# Patient Record
Sex: FEMALE | Race: WHITE | Employment: UNEMPLOYED | URBAN - METROPOLITAN AREA
[De-identification: names, ages, dates, MRNs, and addresses within clinical notes are randomized per-mention and may not be internally consistent; named-entity substitution may affect disease eponyms.]

---

## 2017-02-23 ENCOUNTER — OFFICE VISIT (OUTPATIENT)
Dept: FAMILY MEDICINE CLINIC | Facility: CLINIC | Age: 52
End: 2017-02-23

## 2017-02-23 VITALS
TEMPERATURE: 98 F | BODY MASS INDEX: 27 KG/M2 | SYSTOLIC BLOOD PRESSURE: 150 MMHG | WEIGHT: 161 LBS | OXYGEN SATURATION: 98 % | RESPIRATION RATE: 18 BRPM | HEART RATE: 90 BPM | DIASTOLIC BLOOD PRESSURE: 82 MMHG

## 2017-02-23 DIAGNOSIS — J32.9 VIRAL SINUSITIS: Primary | ICD-10-CM

## 2017-02-23 DIAGNOSIS — B97.89 VIRAL SINUSITIS: Primary | ICD-10-CM

## 2017-02-23 PROCEDURE — 99213 OFFICE O/P EST LOW 20 MIN: CPT | Performed by: PHYSICIAN ASSISTANT

## 2017-02-23 RX ORDER — DOXYCYCLINE HYCLATE 100 MG
100 TABLET ORAL 2 TIMES DAILY
Qty: 20 TABLET | Refills: 0 | Status: SHIPPED | OUTPATIENT
Start: 2017-02-23 | End: 2017-03-05

## 2017-02-23 RX ORDER — FLUTICASONE PROPIONATE 50 MCG
2 SPRAY, SUSPENSION (ML) NASAL DAILY
Qty: 1 BOTTLE | Refills: 0 | Status: SHIPPED | OUTPATIENT
Start: 2017-02-23 | End: 2018-02-18

## 2017-02-23 RX ORDER — OMEPRAZOLE 20 MG/1
20 CAPSULE, DELAYED RELEASE ORAL
COMMUNITY

## 2017-02-23 RX ORDER — GUAIFENESIN AND CODEINE PHOSPHATE 100; 10 MG/5ML; MG/5ML
10 SOLUTION ORAL EVERY 6 HOURS PRN
Qty: 120 ML | Refills: 0 | Status: SHIPPED | OUTPATIENT
Start: 2017-02-23 | End: 2017-04-28 | Stop reason: ALTCHOICE

## 2017-02-23 NOTE — PATIENT INSTRUCTIONS
1.  Guaifenesin/codeine as prescribed for cough. This medication may cause drowsiness/sedation. Avoid driving or operating heavy machinery while on this medication.     2.  Encourage fluids, humidifier/vaporizor at bedside, elevate head of bed (sleep with · Over-the-counter decongestants may be used unless a similar medicine was prescribed. Nasal sprays work the fastest. Use one that contains phenylephrine or oxymetazoline. First blow the nose gently. Then use the spray.  Do not use these medicines more ofte © 7010-7134 [address], [address] [place]. All rights reserved. This information is not intended as a substitute for professional medical care. Always follow your healthcare professional's instructions.

## 2017-02-23 NOTE — PROGRESS NOTES
CHIEF COMPLAINT:   Patient presents with:  Cough/URI      HPI:   Kirill Galindo is a 54year old female who presents for cold symptoms for  5  days. Symptoms have progressed into sinus congestion and been worsening since onset.  Sinus congestion/pain is • Hypertension Mother         Smoking Status: Never Smoker                      Smokeless Status: Never Used                        Alcohol Use: Yes                Comment: social drinker        REVIEW OF SYSTEMS:   GENERAL:  normal appetite  SKIN: no rash 1.  D/w pt suspect viral sinusitis. Typical course/duration reviewed.   Tx aimed at symptom management, reviewed continue Flonase, encourage fluids, robitussin AC qhs, warm compresses over forehead, trial of OTC IBU or Aleve prn for sinus pressure/HA ,sinu The sinuses are air-filled spaces within the bones of the face. They connect to the inside of the nose. Sinusitis is an inflammation of the tissue lining the sinus cavity.  Sinus inflammation can occur during a cold. It can also be due to allergies to polle · Use acetaminophen or ibuprofen to control pain, unless another pain medicine was prescribed. (If you have chronic liver or kidney disease or ever had a stomach ulcer, talk with your doctor before using these medicines.  Aspirin should never be used in any

## 2017-04-10 ENCOUNTER — TELEPHONE (OUTPATIENT)
Dept: FAMILY MEDICINE CLINIC | Facility: CLINIC | Age: 52
End: 2017-04-10

## 2017-04-28 ENCOUNTER — OFFICE VISIT (OUTPATIENT)
Dept: FAMILY MEDICINE CLINIC | Facility: CLINIC | Age: 52
End: 2017-04-28

## 2017-04-28 VITALS
HEIGHT: 63.75 IN | HEART RATE: 80 BPM | WEIGHT: 163 LBS | SYSTOLIC BLOOD PRESSURE: 130 MMHG | TEMPERATURE: 99 F | OXYGEN SATURATION: 98 % | BODY MASS INDEX: 28.17 KG/M2 | DIASTOLIC BLOOD PRESSURE: 80 MMHG

## 2017-04-28 DIAGNOSIS — J06.9 VIRAL URI: Primary | ICD-10-CM

## 2017-04-28 DIAGNOSIS — J04.0 LARYNGITIS: ICD-10-CM

## 2017-04-28 PROCEDURE — 99213 OFFICE O/P EST LOW 20 MIN: CPT | Performed by: FAMILY MEDICINE

## 2017-04-28 RX ORDER — FEXOFENADINE HCL 180 MG/1
180 TABLET ORAL DAILY
COMMUNITY
End: 2018-06-05

## 2017-04-28 RX ORDER — SULFAMETHOXAZOLE AND TRIMETHOPRIM 800; 160 MG/1; MG/1
1 TABLET ORAL 2 TIMES DAILY
Qty: 14 TABLET | Refills: 0 | Status: SHIPPED | OUTPATIENT
Start: 2017-04-28 | End: 2017-05-05

## 2017-04-28 NOTE — PROGRESS NOTES
HPI:   Tierney Wick is a 50year old female who presents for upper respiratory symptoms for 4 days.  Symptoms include started with scratchy throat, post nasal drip, fatigue, lost voice, now feels like it's settled in her face with cheek pain and ear p 63.75\"  Wt 163 lb  BMI 28.21 kg/m2  SpO2 98%  LMP 04/21/2017 (Within Days)  GENERAL: well developed, well nourished,in no apparent distress; sounds congested and has hoarse soft voice  SKIN: no rashes,no suspicious lesions  EYES: EOMI, conjunctiva are kip

## 2017-05-15 ENCOUNTER — TELEPHONE (OUTPATIENT)
Dept: FAMILY MEDICINE CLINIC | Facility: CLINIC | Age: 52
End: 2017-05-15

## 2017-06-20 ENCOUNTER — TELEPHONE (OUTPATIENT)
Dept: FAMILY MEDICINE CLINIC | Facility: CLINIC | Age: 52
End: 2017-06-20

## 2017-07-12 ENCOUNTER — TELEPHONE (OUTPATIENT)
Dept: FAMILY MEDICINE CLINIC | Facility: CLINIC | Age: 52
End: 2017-07-12

## 2017-07-12 DIAGNOSIS — E55.9 VITAMIN D DEFICIENCY: ICD-10-CM

## 2017-07-12 DIAGNOSIS — Z13.0 SCREENING, ANEMIA, DEFICIENCY, IRON: ICD-10-CM

## 2017-07-12 DIAGNOSIS — Z13.220 LIPID SCREENING: ICD-10-CM

## 2017-07-12 DIAGNOSIS — Z13.29 THYROID DISORDER SCREEN: ICD-10-CM

## 2017-07-12 DIAGNOSIS — Z00.00 HEALTHCARE MAINTENANCE: Primary | ICD-10-CM

## 2017-07-12 NOTE — TELEPHONE ENCOUNTER
Pt is coming in tomorrow for her CMP,  Wants to know if [facility] will do a full panel of fasting labs while she is here.

## 2017-07-13 ENCOUNTER — NURSE ONLY (OUTPATIENT)
Dept: FAMILY MEDICINE CLINIC | Facility: CLINIC | Age: 52
End: 2017-07-13

## 2017-07-13 DIAGNOSIS — Z00.00 HEALTHCARE MAINTENANCE: ICD-10-CM

## 2017-07-13 DIAGNOSIS — E55.9 VITAMIN D DEFICIENCY: ICD-10-CM

## 2017-07-13 DIAGNOSIS — Z13.29 THYROID DISORDER SCREEN: ICD-10-CM

## 2017-07-13 DIAGNOSIS — Z13.220 LIPID SCREENING: ICD-10-CM

## 2017-07-13 DIAGNOSIS — Z13.0 SCREENING, ANEMIA, DEFICIENCY, IRON: ICD-10-CM

## 2017-07-13 LAB
25-HYDROXYVITAMIN D (TOTAL): 22.4 NG/ML (ref 30–100)
BASOPHILS # BLD AUTO: 0.07 X10(3) UL (ref 0–0.1)
BASOPHILS NFR BLD AUTO: 0.9 %
CHOLEST SMN-MCNC: 192 MG/DL (ref ?–200)
DEPRECATED HBV CORE AB SER IA-ACNC: 14.8 NG/ML (ref 10–291)
EOSINOPHIL # BLD AUTO: 0.17 X10(3) UL (ref 0–0.3)
EOSINOPHIL NFR BLD AUTO: 2.3 %
ERYTHROCYTE [DISTWIDTH] IN BLOOD BY AUTOMATED COUNT: 13 % (ref 11.5–16)
HCT VFR BLD AUTO: 42.6 % (ref 34–50)
HDLC SERPL-MCNC: 41 MG/DL (ref 45–?)
HDLC SERPL: 4.68 {RATIO} (ref ?–4.44)
HGB BLD-MCNC: 13.9 G/DL (ref 12–16)
IMMATURE GRANULOCYTE COUNT: 0.02 X10(3) UL (ref 0–1)
IMMATURE GRANULOCYTE RATIO %: 0.3 %
LDLC SERPL CALC-MCNC: 116 MG/DL (ref ?–130)
LYMPHOCYTES # BLD AUTO: 1.38 X10(3) UL (ref 0.9–4)
LYMPHOCYTES NFR BLD AUTO: 18.3 %
MCH RBC QN AUTO: 27.8 PG (ref 27–33.2)
MCHC RBC AUTO-ENTMCNC: 32.6 G/DL (ref 31–37)
MCV RBC AUTO: 85.2 FL (ref 81–100)
MONOCYTES # BLD AUTO: 0.87 X10(3) UL (ref 0.1–0.6)
MONOCYTES NFR BLD AUTO: 11.5 %
NEUTROPHIL ABS PRELIM: 5.03 X10 (3) UL (ref 1.3–6.7)
NEUTROPHILS # BLD AUTO: 5.03 X10(3) UL (ref 1.3–6.7)
NEUTROPHILS NFR BLD AUTO: 66.7 %
NONHDLC SERPL-MCNC: 151 MG/DL (ref ?–130)
PLATELET # BLD AUTO: 287 10(3)UL (ref 150–450)
RBC # BLD AUTO: 5 X10(6)UL (ref 3.8–5.1)
RED CELL DISTRIBUTION WIDTH-SD: 39.7 FL (ref 35.1–46.3)
TRIGLYCERIDES: 177 MG/DL (ref ?–150)
TSI SER-ACNC: 0.76 MIU/ML (ref 0.35–5.5)
VLDL: 35 MG/DL (ref 5–40)
WBC # BLD AUTO: 7.5 X10(3) UL (ref 4–13)

## 2017-07-13 PROCEDURE — 80061 LIPID PANEL: CPT | Performed by: FAMILY MEDICINE

## 2017-07-13 PROCEDURE — 82728 ASSAY OF FERRITIN: CPT | Performed by: FAMILY MEDICINE

## 2017-07-13 PROCEDURE — 36415 COLL VENOUS BLD VENIPUNCTURE: CPT | Performed by: FAMILY MEDICINE

## 2017-07-13 PROCEDURE — 80050 GENERAL HEALTH PANEL: CPT | Performed by: FAMILY MEDICINE

## 2017-07-13 PROCEDURE — 82306 VITAMIN D 25 HYDROXY: CPT | Performed by: FAMILY MEDICINE

## 2018-06-05 ENCOUNTER — OFFICE VISIT (OUTPATIENT)
Dept: FAMILY MEDICINE CLINIC | Facility: CLINIC | Age: 53
End: 2018-06-05

## 2018-06-05 VITALS
HEART RATE: 86 BPM | BODY MASS INDEX: 27.14 KG/M2 | DIASTOLIC BLOOD PRESSURE: 80 MMHG | SYSTOLIC BLOOD PRESSURE: 140 MMHG | WEIGHT: 159 LBS | TEMPERATURE: 99 F | RESPIRATION RATE: 16 BRPM | HEIGHT: 64 IN

## 2018-06-05 DIAGNOSIS — J01.40 ACUTE NON-RECURRENT PANSINUSITIS: Primary | ICD-10-CM

## 2018-06-05 PROCEDURE — 99214 OFFICE O/P EST MOD 30 MIN: CPT | Performed by: FAMILY MEDICINE

## 2018-06-05 RX ORDER — AZITHROMYCIN 250 MG/1
TABLET, FILM COATED ORAL
Qty: 6 TABLET | Refills: 0 | Status: SHIPPED | OUTPATIENT
Start: 2018-06-05 | End: 2018-06-23 | Stop reason: ALTCHOICE

## 2018-06-23 NOTE — PROGRESS NOTES
HPI:   Geronimo Hernández is a 48year old female who presents for upper respiratory symptoms for 6 days. Symptoms include worseining congestion, cough, head pressure. Nothing mas better or worse but overll worsening.   Medications tried otc cough/cold pro percussion  NECK: supple,no adenopathy  LUNGS: clear to auscultation  CARDIO: RRR without murmur; well perfused    ASSESSMENT AND PLAN:   Victor Manuel Dugan is a 48year old female who presents with sinusitis.  PLAN:   abx indicated; push fluids, run humidi

## 2018-10-24 PROCEDURE — 83090 ASSAY OF HOMOCYSTEINE: CPT | Performed by: OTHER

## 2018-11-12 ENCOUNTER — OFFICE VISIT (OUTPATIENT)
Dept: FAMILY MEDICINE CLINIC | Facility: CLINIC | Age: 53
End: 2018-11-12
Payer: COMMERCIAL

## 2018-11-12 VITALS
WEIGHT: 164 LBS | HEIGHT: 65.5 IN | HEART RATE: 86 BPM | DIASTOLIC BLOOD PRESSURE: 76 MMHG | BODY MASS INDEX: 27 KG/M2 | SYSTOLIC BLOOD PRESSURE: 124 MMHG | TEMPERATURE: 99 F

## 2018-11-12 DIAGNOSIS — E55.9 VITAMIN D DEFICIENCY: ICD-10-CM

## 2018-11-12 DIAGNOSIS — Z23 NEED FOR VACCINATION: ICD-10-CM

## 2018-11-12 DIAGNOSIS — Z13.220 LIPID SCREENING: ICD-10-CM

## 2018-11-12 DIAGNOSIS — G37.3 TRANSVERSE MYELITIS (HCC): ICD-10-CM

## 2018-11-12 DIAGNOSIS — K21.9 GASTROESOPHAGEAL REFLUX DISEASE WITHOUT ESOPHAGITIS: ICD-10-CM

## 2018-11-12 DIAGNOSIS — E78.2 ELEVATED TRIGLYCERIDES WITH HIGH CHOLESTEROL: ICD-10-CM

## 2018-11-12 DIAGNOSIS — Z00.00 ROUTINE HISTORY AND PHYSICAL EXAMINATION OF ADULT: Primary | ICD-10-CM

## 2018-11-12 DIAGNOSIS — Z13.29 THYROID DISORDER SCREEN: ICD-10-CM

## 2018-11-12 DIAGNOSIS — J30.9 ALLERGIC RHINITIS, UNSPECIFIED SEASONALITY, UNSPECIFIED TRIGGER: ICD-10-CM

## 2018-11-12 DIAGNOSIS — Z13.0 SCREENING, ANEMIA, DEFICIENCY, IRON: ICD-10-CM

## 2018-11-12 DIAGNOSIS — Z13.1 DIABETES MELLITUS SCREENING: ICD-10-CM

## 2018-11-12 PROCEDURE — 90471 IMMUNIZATION ADMIN: CPT | Performed by: FAMILY MEDICINE

## 2018-11-12 PROCEDURE — 99396 PREV VISIT EST AGE 40-64: CPT | Performed by: FAMILY MEDICINE

## 2018-11-12 PROCEDURE — 90715 TDAP VACCINE 7 YRS/> IM: CPT | Performed by: FAMILY MEDICINE

## 2018-11-12 PROCEDURE — 80050 GENERAL HEALTH PANEL: CPT | Performed by: FAMILY MEDICINE

## 2018-11-12 PROCEDURE — 82306 VITAMIN D 25 HYDROXY: CPT | Performed by: FAMILY MEDICINE

## 2018-11-12 PROCEDURE — 36415 COLL VENOUS BLD VENIPUNCTURE: CPT | Performed by: FAMILY MEDICINE

## 2018-11-12 PROCEDURE — 83036 HEMOGLOBIN GLYCOSYLATED A1C: CPT | Performed by: FAMILY MEDICINE

## 2018-11-12 PROCEDURE — 80061 LIPID PANEL: CPT | Performed by: FAMILY MEDICINE

## 2018-11-12 NOTE — PROGRESS NOTES
HPI:   Simi Kohli is a 48year old female who presents for a complete physical exam.      Patient complains of nothing major, GERD has flared (saw GI, on omeprazole, scheduld for EGD) but feels like pills and even water get stuck sometimes.   Eric Glaser 3350-KCl-NaBcb-NaCl-NaSulf (PEG 3350/ELECTROLYTES) 240 g Oral Recon Soln Take as directed by physician. Disp: 4000 mL Rfl: 0   Na Sulfate-K Sulfate-Mg Sulf (SUPREP BOWEL PREP KIT) 17.5-3.13-1.6 GM/177ML Oral Solution Take as directed by physician.  Disp: 1 HPI; no nausea, no changes in BMs, no blood in stool  MUSCULOSKELETAL: denies new or unusual back pain, no joint pains or swelling  NEURO: denies headaches, no syncope or near syncope; gets RLS symptoms at night, though she and rubén not sure if effect of CPM    6. Screening, anemia, deficiency, iron  CBC    7. Diabetes mellitus screening  CMP, HbA1C    8. Thyroid disorder screen  tsh    9. Lipid screening  Lipid  10.  Vit D def  Vit d 25-oh        For disease prevention (dementia, cancer, diabetes, heart di

## 2018-11-14 ENCOUNTER — TELEPHONE (OUTPATIENT)
Dept: FAMILY MEDICINE CLINIC | Facility: CLINIC | Age: 53
End: 2018-11-14

## 2018-11-14 NOTE — TELEPHONE ENCOUNTER
----- Message from Christina Nathan MD sent at 11/14/2018  7:51 AM CST -----  Please notify patient good news, labs look good, including blood counts, blood sugar, kidneys, liver, electrolytes, thyroid.   Cholesterol went up a bit this year, nothing too alarmi

## 2018-11-14 NOTE — PROGRESS NOTES
Please notify patient good news, labs look good, including blood counts, blood sugar, kidneys, liver, electrolytes, thyroid. Cholesterol went up a bit this year, nothing too alarming.   If there is room for improvement in eating habits (shooting for a plan

## 2018-11-14 NOTE — TELEPHONE ENCOUNTER
Pt advised of results- verbalized understanding    Pt states she is currently not taking Vitamin D3- what dose would you recommend?

## 2018-11-19 ENCOUNTER — LAB REQUISITION (OUTPATIENT)
Dept: LAB | Facility: HOSPITAL | Age: 53
End: 2018-11-19
Payer: COMMERCIAL

## 2018-11-19 DIAGNOSIS — Z12.12 ENCOUNTER FOR SCREENING FOR MALIGNANT NEOPLASM OF RECTUM: ICD-10-CM

## 2018-11-19 DIAGNOSIS — K21.9 GASTRO-ESOPHAGEAL REFLUX DISEASE WITHOUT ESOPHAGITIS: ICD-10-CM

## 2018-11-19 DIAGNOSIS — Z12.11 ENCOUNTER FOR SCREENING FOR MALIGNANT NEOPLASM OF COLON: ICD-10-CM

## 2018-11-19 DIAGNOSIS — R14.0 ABDOMINAL DISTENSION (GASEOUS): ICD-10-CM

## 2018-11-19 PROCEDURE — 88305 TISSUE EXAM BY PATHOLOGIST: CPT | Performed by: INTERNAL MEDICINE

## 2018-11-21 ENCOUNTER — NURSE ONLY (OUTPATIENT)
Dept: FAMILY MEDICINE CLINIC | Facility: CLINIC | Age: 53
End: 2018-11-21
Payer: COMMERCIAL

## 2018-11-21 ENCOUNTER — TELEPHONE (OUTPATIENT)
Dept: FAMILY MEDICINE CLINIC | Facility: CLINIC | Age: 53
End: 2018-11-21

## 2018-11-21 DIAGNOSIS — R35.0 URINE FREQUENCY: Primary | ICD-10-CM

## 2018-11-21 PROCEDURE — 81003 URINALYSIS AUTO W/O SCOPE: CPT | Performed by: FAMILY MEDICINE

## 2018-11-21 PROCEDURE — 87086 URINE CULTURE/COLONY COUNT: CPT | Performed by: FAMILY MEDICINE

## 2018-11-21 NOTE — PROGRESS NOTES
Recent Results (from the past 24 hour(s))   URINALYSIS, AUTO, W/O SCOPE    Collection Time: 11/21/18 11:08 AM   Result Value Ref Range    GLUCOSE (URINE DIPSTICK) Negative mg/dL    BILIRUBIN Negative Negative    KETONES (URINE DIPSTICK) Negative Negative m

## 2018-11-21 NOTE — TELEPHONE ENCOUNTER
Spoke with the pt and she has urgency and lower abd cramping. She did have her colonoscopy on Monday and wonders if this is the culprit for the symptoms.  Advised that Dr. Soha Vallecillo is out of the office and I will see if one of the covering providers need to se

## 2018-11-21 NOTE — TELEPHONE ENCOUNTER
Per Dr. Alice Davis order a urine dip and he will address    Called the pt and she is going to come over and give a urine sample  Future Appointments   Date Time Provider Doroteo Spann   11/21/2018 10:00 AM  [facility] [place] EMG Cora Fry

## 2019-01-28 ENCOUNTER — PATIENT MESSAGE (OUTPATIENT)
Dept: FAMILY MEDICINE CLINIC | Facility: CLINIC | Age: 54
End: 2019-01-28

## 2019-01-28 DIAGNOSIS — Z12.31 SCREENING MAMMOGRAM, ENCOUNTER FOR: Primary | ICD-10-CM

## 2019-01-28 NOTE — TELEPHONE ENCOUNTER
From: Helga Chan  To: Justino Vale MD  Sent: 1/28/2019 10:27 AM CST  Subject: Non-Urgent Medical Question    Good Morning,    I was wondering if you wouldn't mind ordering my mammogram for me this year.      I am not due to see Dr. Rose Benitez OB/GYN

## 2019-02-11 ENCOUNTER — HOSPITAL ENCOUNTER (OUTPATIENT)
Dept: MRI IMAGING | Age: 54
Discharge: HOME | End: 2019-02-11
Attending: FAMILY MEDICINE
Payer: COMMERCIAL

## 2019-02-11 DIAGNOSIS — M77.11 RIGHT LATERAL EPICONDYLITIS: ICD-10-CM

## 2019-02-11 PROCEDURE — 73221 MRI JOINT UPR EXTREM W/O DYE: CPT | Performed by: FAMILY MEDICINE

## 2019-02-27 PROBLEM — M77.01 MEDIAL EPICONDYLITIS OF RIGHT ELBOW: Status: ACTIVE | Noted: 2019-02-27

## 2019-02-27 PROBLEM — M77.11 RIGHT LATERAL EPICONDYLITIS: Status: ACTIVE | Noted: 2019-02-27

## 2019-03-01 ENCOUNTER — TELEPHONE (OUTPATIENT)
Dept: FAMILY MEDICINE CLINIC | Facility: CLINIC | Age: 54
End: 2019-03-01

## 2019-03-01 NOTE — TELEPHONE ENCOUNTER
Letter mailed to patient reminding her she has outstanding orders.     Lab Frequency Next Occurrence   RONEL SCREENING BILAT (CPT=77067) Once 01/30/2019

## 2020-01-22 ENCOUNTER — HOSPITAL ENCOUNTER (OUTPATIENT)
Dept: GENERAL RADIOLOGY | Age: 55
Discharge: HOME | End: 2020-01-22
Attending: FAMILY MEDICINE
Payer: COMMERCIAL

## 2020-01-22 ENCOUNTER — OFFICE VISIT (OUTPATIENT)
Dept: FAMILY MEDICINE CLINIC | Facility: CLINIC | Age: 55
End: 2020-01-22
Payer: COMMERCIAL

## 2020-01-22 VITALS
BODY MASS INDEX: 29 KG/M2 | OXYGEN SATURATION: 98 % | DIASTOLIC BLOOD PRESSURE: 80 MMHG | TEMPERATURE: 99 F | WEIGHT: 174.19 LBS | SYSTOLIC BLOOD PRESSURE: 130 MMHG

## 2020-01-22 DIAGNOSIS — R05.9 COUGH: ICD-10-CM

## 2020-01-22 DIAGNOSIS — R07.89 CHEST TIGHTNESS: ICD-10-CM

## 2020-01-22 DIAGNOSIS — R05.9 COUGH: Primary | ICD-10-CM

## 2020-01-22 PROCEDURE — 71046 X-RAY EXAM CHEST 2 VIEWS: CPT | Performed by: FAMILY MEDICINE

## 2020-01-22 PROCEDURE — 99214 OFFICE O/P EST MOD 30 MIN: CPT | Performed by: FAMILY MEDICINE

## 2020-01-22 PROCEDURE — 94640 AIRWAY INHALATION TREATMENT: CPT | Performed by: FAMILY MEDICINE

## 2020-01-22 RX ORDER — AZITHROMYCIN 250 MG/1
TABLET, FILM COATED ORAL
Qty: 6 TABLET | Refills: 0 | Status: SHIPPED | OUTPATIENT
Start: 2020-01-22 | End: 2020-10-21 | Stop reason: CLARIF

## 2020-01-22 RX ORDER — ALBUTEROL SULFATE 2.5 MG/3ML
2.5 SOLUTION RESPIRATORY (INHALATION) ONCE
Status: COMPLETED | OUTPATIENT
Start: 2020-01-22 | End: 2020-01-22

## 2020-01-22 RX ORDER — BUDESONIDE AND FORMOTEROL FUMARATE DIHYDRATE 80; 4.5 UG/1; UG/1
2 AEROSOL RESPIRATORY (INHALATION) 2 TIMES DAILY
Qty: 1 INHALER | Refills: 0 | COMMUNITY
Start: 2020-01-22 | End: 2020-02-22

## 2020-01-22 RX ADMIN — ALBUTEROL SULFATE 2.5 MG: 2.5 SOLUTION RESPIRATORY (INHALATION) at 11:30:00

## 2020-01-22 NOTE — PROGRESS NOTES
HPI:   Helga Chan is a 48year old female who presents for upper respiratory symptoms for 7 days. Started with: right away in her chest, like a chest heaviness and mild cough, that lasted a few days.     Now has: worse congestion, worse cough, po incontinence 2018   • Transverse myelitis (HCC)     has residual neurologic symptoms, sees neuro   • Uncomfortable fullness after meals 09/30/2018   • Wears glasses       Past Surgical History:   Procedure Laterality Date   • CHOLECYSTECTOMY     • EGD (VENTOLIN) (2.5 MG/3ML) 0.083% nebulizer solution 2.5 mg  -     XR CHEST PA + LAT CHEST (CPT=71046); Future    Chest tightness  -     albuterol sulfate (VENTOLIN) (2.5 MG/3ML) 0.083% nebulizer solution 2.5 mg  -     XR CHEST PA + LAT CHEST (CPT=71046);  Fut

## 2020-02-12 ENCOUNTER — HOSPITAL ENCOUNTER (OUTPATIENT)
Dept: GENERAL RADIOLOGY | Age: 55
Discharge: HOME | End: 2020-02-12
Attending: FAMILY MEDICINE
Payer: COMMERCIAL

## 2020-02-12 DIAGNOSIS — R93.89 ABNORMAL CHEST X-RAY: ICD-10-CM

## 2020-02-12 PROCEDURE — 71046 X-RAY EXAM CHEST 2 VIEWS: CPT | Performed by: FAMILY MEDICINE

## 2020-10-20 ENCOUNTER — TELEPHONE (OUTPATIENT)
Dept: FAMILY MEDICINE CLINIC | Facility: CLINIC | Age: 55
End: 2020-10-20

## 2020-10-20 NOTE — TELEPHONE ENCOUNTER
Alton Watters verbally {consents to a [employer] on 10/20/2020.   Patient understands and accepts financial responsibility for any deductible, co-insurance and/or co-pays associated with this service

## 2020-10-20 NOTE — TELEPHONE ENCOUNTER
If she has URI symptoms can't be seen in office. But does she want to wait 8 days for VV? Would she prefer to do VV with one of my partners or go to UC?

## 2020-10-20 NOTE — TELEPHONE ENCOUNTER
PT HAS SCHEDULED AN APT ON 10/28/20 FOR SINUS ISSUES VIA [employer]. WOULD YOU LIKE TO SEE PT OR CHANGE TO VIDEO VISIT?     PLEASE ADV    THANK YOU

## 2020-10-21 ENCOUNTER — TELEMEDICINE (OUTPATIENT)
Dept: FAMILY MEDICINE CLINIC | Facility: CLINIC | Age: 55
End: 2020-10-21
Payer: COMMERCIAL

## 2020-10-21 DIAGNOSIS — J98.01 COUGH DUE TO BRONCHOSPASM: ICD-10-CM

## 2020-10-21 DIAGNOSIS — H93.8X3 CONGESTION OF BOTH EARS: Primary | ICD-10-CM

## 2020-10-21 DIAGNOSIS — R09.82 POST-NASAL DRIP: ICD-10-CM

## 2020-10-21 PROCEDURE — 99213 OFFICE O/P EST LOW 20 MIN: CPT | Performed by: FAMILY MEDICINE

## 2020-10-21 RX ORDER — ALBUTEROL SULFATE 90 UG/1
2 AEROSOL, METERED RESPIRATORY (INHALATION) EVERY 6 HOURS PRN
Qty: 18 G | Refills: 0 | Status: SHIPPED | OUTPATIENT
Start: 2020-10-21 | End: 2021-11-30

## 2020-10-21 RX ORDER — AZITHROMYCIN 250 MG/1
TABLET, FILM COATED ORAL
Qty: 6 TABLET | Refills: 0 | Status: SHIPPED | OUTPATIENT
Start: 2020-10-21 | End: 2020-10-26

## 2020-10-21 NOTE — PROGRESS NOTES
This is a telemedicine visit with live, interactive video and audio. Patient understands and accepts financial responsibility for any deductible, co-insurance and/or co-pays associated with this service.     SUBJECTIVE  51 yo F here with complaints of c 09/30/2018   • Wears glasses       Past Surgical History:   Procedure Laterality Date   • CHOLECYSTECTOMY     • EGD     • FOOT FRACTURE SURGERY  November 2017   • KNEE ARTHROSCOPY      left knee   • KNEE SURGERY     • OTHER SURGICAL HISTORY  1999    Michael orders for this visit:    Congestion of both ears    Post-nasal drip    Cough due to bronchospasm    Other orders  -     Albuterol Sulfate  (90 Base) MCG/ACT Inhalation Aero Soln;  Inhale 2 puffs into the lungs every 6 (six) hours as needed for [facility]

## 2020-11-11 ENCOUNTER — OFFICE VISIT (OUTPATIENT)
Dept: FAMILY MEDICINE CLINIC | Facility: CLINIC | Age: 55
End: 2020-11-11
Payer: COMMERCIAL

## 2020-11-11 VITALS
OXYGEN SATURATION: 98 % | RESPIRATION RATE: 18 BRPM | BODY MASS INDEX: 28.68 KG/M2 | HEART RATE: 90 BPM | SYSTOLIC BLOOD PRESSURE: 142 MMHG | TEMPERATURE: 97 F | WEIGHT: 168 LBS | HEIGHT: 64 IN | DIASTOLIC BLOOD PRESSURE: 80 MMHG

## 2020-11-11 DIAGNOSIS — Z20.822 EXPOSURE TO COVID-19 VIRUS: Primary | ICD-10-CM

## 2020-11-11 DIAGNOSIS — Z20.822 SUSPECTED COVID-19 VIRUS INFECTION: ICD-10-CM

## 2020-11-11 PROCEDURE — 3008F BODY MASS INDEX DOCD: CPT | Performed by: NURSE PRACTITIONER

## 2020-11-11 PROCEDURE — 3077F SYST BP >= 140 MM HG: CPT | Performed by: NURSE PRACTITIONER

## 2020-11-11 PROCEDURE — 3079F DIAST BP 80-89 MM HG: CPT | Performed by: NURSE PRACTITIONER

## 2020-11-11 PROCEDURE — 99072 ADDL SUPL MATRL&STAF TM PHE: CPT | Performed by: NURSE PRACTITIONER

## 2020-11-11 PROCEDURE — 99213 OFFICE O/P EST LOW 20 MIN: CPT | Performed by: NURSE PRACTITIONER

## 2020-11-11 NOTE — PATIENT INSTRUCTIONS
1. Rest. Drink plenty of fluids. 2. Supportive care as discussed. 3. Covid-19 testing sent to lab. Self quarantine at this time per CDC guidelines while awaiting test results. (If positive self quarantine should extend until at least 10 days from onset home isolation instructions. Your test results will be called to you from an [facility] representative. If you have not received a call within 2 business days, please call your primary care provider or check [employer] for results.     10 Ways to Manage healthcare provider can help direct you on next steps. If you have not been exposed or are not aware of an exposure to COVID-19 and are concerned about your symptoms, please contact your health care provider with any questions.     Home Isolation  If you very similar to donating blood. Suzette Gabriel (a large blood research institute in [address] [facility] and one of [facility]’s blood product suppliers) is coordinating plasma donations.     If you would be interested in donating your plasma to help treat others eyad

## 2020-11-11 NOTE — PROGRESS NOTES
CHIEF COMPLAINT:   Patient presents with:  Cough: x 2 days /  exposure to covid      HPI:   Amaury Ferreira is a 50year old female who presents for covid-19 testing. Patient reports known exposure to covid-19 on 11/7/20.  Notes nonproductive cough x 2 d • Wears glasses       Past Surgical History:   Procedure Laterality Date   • CHOLECYSTECTOMY     • EGD     • FOOT FRACTURE SURGERY  November 2017   • KNEE ARTHROSCOPY      left knee   • KNEE SURGERY     • OTHER SURGICAL HISTORY  1999    Gallbladder   • REM (Z20.828) Exposure to COVID-19 virus  (primary encounter diagnosis)  Plan: SARS-COV-2 RNA,QUAL RT-PCR ([facility]), SARS-COV-2         RNA,QUAL RT-PCR ([facility])            (Z20.828) Suspected COVID-19 virus infection  Plan:       Covid-19 testing sent to lab. Please review the entirety of this informational document. It includes information related to exposure, pending tests, positive results, aftercare, and plasma donation. There is no specific antiviral treatment recommended for COVID-19.  People with COVI 7. Wash your hands often with soap and water for at least 20 seconds or clean your hands with an alcohol-based hand  that contains at least 60% alcohol. 8. As much as possible, stay in a specific room and away from other people in your home.  France Grubbs If you have a fever with cough or shortness of breath but have not been exposed to someone with COVID-19 and have not tested positive for COVID-19, you should also stay home and away from others for a total of 10 days after your symptoms started, and at [employer] You can also get more information at the following websites:   Centers for Disease Control & Prevention (CDC)  What to do if you are sick with coronavirus disease 2019, [email or web address].pt. pdf

## 2021-03-29 ENCOUNTER — PATIENT MESSAGE (OUTPATIENT)
Dept: FAMILY MEDICINE CLINIC | Facility: CLINIC | Age: 56
End: 2021-03-29

## 2021-03-29 DIAGNOSIS — M25.522 LEFT ELBOW PAIN: Primary | ICD-10-CM

## 2021-03-29 NOTE — TELEPHONE ENCOUNTER
From: Christiane Palafox  To: Nikko Mcgarry MD  Sent: 3/29/2021 4:45 PM CDT  Subject: Non-Urgent Medical Question    Hello,     Last Tuesday I hit my left elbow on the corner of a door frame. Slight swelling and small bruise.  I'm still  having pain especia

## 2021-04-19 ENCOUNTER — OFFICE VISIT (OUTPATIENT)
Dept: FAMILY MEDICINE CLINIC | Facility: CLINIC | Age: 56
End: 2021-04-19
Payer: COMMERCIAL

## 2021-04-19 VITALS
TEMPERATURE: 98 F | RESPIRATION RATE: 16 BRPM | DIASTOLIC BLOOD PRESSURE: 70 MMHG | BODY MASS INDEX: 29.02 KG/M2 | HEIGHT: 64 IN | HEART RATE: 87 BPM | OXYGEN SATURATION: 98 % | WEIGHT: 170 LBS | SYSTOLIC BLOOD PRESSURE: 122 MMHG

## 2021-04-19 DIAGNOSIS — Z23 NEED FOR VACCINATION: ICD-10-CM

## 2021-04-19 DIAGNOSIS — E78.2 ELEVATED TRIGLYCERIDES WITH HIGH CHOLESTEROL: ICD-10-CM

## 2021-04-19 DIAGNOSIS — J45.20 MILD INTERMITTENT ASTHMA WITHOUT COMPLICATION: ICD-10-CM

## 2021-04-19 DIAGNOSIS — Z13.220 LIPID SCREENING: ICD-10-CM

## 2021-04-19 DIAGNOSIS — Z00.00 ROUTINE HISTORY AND PHYSICAL EXAMINATION OF ADULT: Primary | ICD-10-CM

## 2021-04-19 DIAGNOSIS — G37.3 TRANSVERSE MYELITIS (HCC): ICD-10-CM

## 2021-04-19 DIAGNOSIS — J30.1 SEASONAL ALLERGIC RHINITIS DUE TO POLLEN: ICD-10-CM

## 2021-04-19 DIAGNOSIS — K21.9 GASTROESOPHAGEAL REFLUX DISEASE WITHOUT ESOPHAGITIS: ICD-10-CM

## 2021-04-19 DIAGNOSIS — Z13.29 THYROID DISORDER SCREEN: ICD-10-CM

## 2021-04-19 DIAGNOSIS — Z13.1 DIABETES MELLITUS SCREENING: ICD-10-CM

## 2021-04-19 PROCEDURE — 3078F DIAST BP <80 MM HG: CPT | Performed by: FAMILY MEDICINE

## 2021-04-19 PROCEDURE — 99396 PREV VISIT EST AGE 40-64: CPT | Performed by: FAMILY MEDICINE

## 2021-04-19 PROCEDURE — 83036 HEMOGLOBIN GLYCOSYLATED A1C: CPT | Performed by: FAMILY MEDICINE

## 2021-04-19 PROCEDURE — 82607 VITAMIN B-12: CPT | Performed by: FAMILY MEDICINE

## 2021-04-19 PROCEDURE — 3074F SYST BP LT 130 MM HG: CPT | Performed by: FAMILY MEDICINE

## 2021-04-19 PROCEDURE — 3008F BODY MASS INDEX DOCD: CPT | Performed by: FAMILY MEDICINE

## 2021-04-19 PROCEDURE — 80050 GENERAL HEALTH PANEL: CPT | Performed by: FAMILY MEDICINE

## 2021-04-19 PROCEDURE — 80061 LIPID PANEL: CPT | Performed by: FAMILY MEDICINE

## 2021-04-19 RX ORDER — MONTELUKAST SODIUM 10 MG/1
10 TABLET ORAL DAILY
Qty: 90 TABLET | Refills: 3 | Status: SHIPPED | OUTPATIENT
Start: 2021-04-19 | End: 2021-04-19

## 2021-04-19 RX ORDER — RIBOFLAVIN (VITAMIN B2) 100 MG
100 TABLET ORAL DAILY
COMMUNITY

## 2021-04-19 RX ORDER — MULTIVIT-MIN/IRON FUM/FOLIC AC 7.5 MG-4
1 TABLET ORAL DAILY
COMMUNITY

## 2021-04-19 RX ORDER — MONTELUKAST SODIUM 10 MG/1
10 TABLET ORAL NIGHTLY
Qty: 90 TABLET | Refills: 3 | Status: SHIPPED | OUTPATIENT
Start: 2021-04-19 | End: 2022-04-14

## 2021-04-19 NOTE — PROGRESS NOTES
HPI:   Rigoberto Downs is a 49year old female who presents for a complete physical exam.      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 Generally feels well.   Hasn't been good about iron for RLS b/ TWICE DAILY OR THREE TIMES DAILY 270 tablet 3   • omeprazole 20 MG Oral Capsule Delayed Release Take 20 mg by mouth every morning before breakfast.        Past Medical History:   Diagnosis Date   • Abdominal distention 09/30/2018   • Abdominal pain 09/30/2 Comment: social drinker    Drug use: No       REVIEW OF SYSTEMS:   GENERAL: feels well in general, no unexpected weight changes, no fevers  See HPI    EXAM:   /70   Pulse 87   Temp 98.2 °F (36.8 °C) (Temporal)   Resp 16   Ht 5' 4\" (1.626 m)   Wt 17 Future  -     VITAMIN B12; Future    Gastroesophageal reflux disease without esophagitis  -Well controlled, CPM   -     VENIPUNCTURE  -     CBC WITH DIFFERENTIAL WITH PLATELET; Future  -     COMP METABOLIC PANEL (14);  Future  -     HEMOGLOBIN A1C; Future patient indicates understanding of these issues and agrees to the plan. The patient is asked to return for CPX in 1 year.     Call in 1 week for results if hasn't heard from us by then

## 2021-07-27 ENCOUNTER — OFFICE VISIT (OUTPATIENT)
Dept: FAMILY MEDICINE CLINIC | Facility: CLINIC | Age: 56
End: 2021-07-27
Payer: COMMERCIAL

## 2021-07-27 VITALS
DIASTOLIC BLOOD PRESSURE: 74 MMHG | OXYGEN SATURATION: 98 % | TEMPERATURE: 99 F | RESPIRATION RATE: 18 BRPM | HEIGHT: 64 IN | BODY MASS INDEX: 28.68 KG/M2 | SYSTOLIC BLOOD PRESSURE: 138 MMHG | HEART RATE: 86 BPM | WEIGHT: 168 LBS

## 2021-07-27 DIAGNOSIS — S49.92XA INJURY OF LEFT SHOULDER, INITIAL ENCOUNTER: Primary | ICD-10-CM

## 2021-07-27 PROCEDURE — 3078F DIAST BP <80 MM HG: CPT | Performed by: NURSE PRACTITIONER

## 2021-07-27 PROCEDURE — 3008F BODY MASS INDEX DOCD: CPT | Performed by: NURSE PRACTITIONER

## 2021-07-27 PROCEDURE — 99212 OFFICE O/P EST SF 10 MIN: CPT | Performed by: NURSE PRACTITIONER

## 2021-07-27 PROCEDURE — 3075F SYST BP GE 130 - 139MM HG: CPT | Performed by: NURSE PRACTITIONER

## 2021-07-27 NOTE — PROGRESS NOTES
CHIEF COMPLAINT:   Patient presents with:  Arm Pain: left shoulder radiating to bicep      HPI:    Arelitobi Kohli is a 49year old female. She presents with chief complaint of pain to left shoulder x 2 days after throwing a frisbee.   Denies any collisi 09/30/2018   • Food intolerance 2017   • Frequent urination 2016   • Heavy menses    • High cholesterol 2016   • Hyperlipidemia    • Indigestion 07/01/2018   • Irregular bowel habits 2017   • Leaking of urine 2016   • Medial epicondylitis of right elbow 2/ auscultation  CARDIO: RRR without murmur    LEFT SHOULDER:   Normal inspection with no shoulder deformity or skin changes. No crepitus, No swelling, No erythema or abnormal tactile warmth. + tenderness along the AC joint. No scapula tenderness.   ROM: Limit Gracia Clemons. We discussed the risks of not going today for further evaluation including worsening condition/permanent injury and she verbalized understanding.  While pt was in clinic today I was able to help her schedule an appt with Dr. Gracia Clemons tomorrow at 3:45pm.

## 2021-07-27 NOTE — PATIENT INSTRUCTIONS
It was recommended you be evaluated at the immediate care today and it was declined as you prefer to follow up with your PCP. We made an appt was for tomorrow at 3:45pm with your PCP Dr. Nargis Peterson in office today. 1. Rest. Drink plenty of fluids.   2. Avoi

## 2021-07-28 ENCOUNTER — OFFICE VISIT (OUTPATIENT)
Dept: FAMILY MEDICINE CLINIC | Facility: CLINIC | Age: 56
End: 2021-07-28
Payer: COMMERCIAL

## 2021-07-28 VITALS
TEMPERATURE: 98 F | SYSTOLIC BLOOD PRESSURE: 124 MMHG | BODY MASS INDEX: 28 KG/M2 | WEIGHT: 165.19 LBS | OXYGEN SATURATION: 96 % | HEART RATE: 96 BPM | DIASTOLIC BLOOD PRESSURE: 70 MMHG

## 2021-07-28 DIAGNOSIS — M25.512 ACUTE PAIN OF LEFT SHOULDER: Primary | ICD-10-CM

## 2021-07-28 PROCEDURE — 3078F DIAST BP <80 MM HG: CPT | Performed by: FAMILY MEDICINE

## 2021-07-28 PROCEDURE — 99214 OFFICE O/P EST MOD 30 MIN: CPT | Performed by: FAMILY MEDICINE

## 2021-07-28 PROCEDURE — 3074F SYST BP LT 130 MM HG: CPT | Performed by: FAMILY MEDICINE

## 2021-07-28 RX ORDER — COVID-19 ANTIGEN TEST
220 KIT MISCELLANEOUS 3 TIMES DAILY
COMMUNITY

## 2021-07-28 NOTE — PROGRESS NOTES
Geronimo Hernández is a 49year old female. HPI:   Patient here for L shoulder pain and decreased ROM. Went to [facility] yesterday and put in a sling and cont aleve and f/u with me. Also ice and rest it.     She was playing with her dogs and went to throw th High cholesterol 2016   • Hyperlipidemia    • Indigestion 07/01/2018   • Irregular bowel habits 2017   • Leaking of urine 2016   • Medial epicondylitis of right elbow 2/27/2019   • Menses painful    • Mild intermittent asthma without complication 0/69/3539 developed, well nourished,in no apparent distress  SKIN: no rashes,no suspicious lesions  HEENT: atraumatic, normocephali  NECK: supple,no masses  CARDS: well perfused  LUNGS: normal resp effort  MUSC: + pain and L AC jointand just inferior to It; + decrea

## 2021-07-29 ENCOUNTER — HOSPITAL ENCOUNTER (OUTPATIENT)
Dept: GENERAL RADIOLOGY | Age: 56
Discharge: HOME | End: 2021-07-29
Attending: FAMILY MEDICINE
Payer: COMMERCIAL

## 2021-07-29 DIAGNOSIS — M25.512 ACUTE PAIN OF LEFT SHOULDER: ICD-10-CM

## 2021-07-29 PROCEDURE — 73030 X-RAY EXAM OF SHOULDER: CPT | Performed by: FAMILY MEDICINE

## 2021-09-24 ENCOUNTER — TELEPHONE (OUTPATIENT)
Dept: FAMILY MEDICINE CLINIC | Facility: CLINIC | Age: 56
End: 2021-09-24

## 2021-09-24 ENCOUNTER — TELEMEDICINE (OUTPATIENT)
Dept: FAMILY MEDICINE CLINIC | Facility: CLINIC | Age: 56
End: 2021-09-24
Payer: COMMERCIAL

## 2021-09-24 DIAGNOSIS — R49.0 HOARSENESS OF VOICE: ICD-10-CM

## 2021-09-24 DIAGNOSIS — R05.8 SPASMODIC COUGH: Primary | ICD-10-CM

## 2021-09-24 DIAGNOSIS — R09.82 POST-NASAL DRIP: ICD-10-CM

## 2021-09-24 PROCEDURE — 99213 OFFICE O/P EST LOW 20 MIN: CPT | Performed by: FAMILY MEDICINE

## 2021-09-24 RX ORDER — AZITHROMYCIN 250 MG/1
TABLET, FILM COATED ORAL
Qty: 6 TABLET | Refills: 0 | Status: SHIPPED | OUTPATIENT
Start: 2021-09-24 | End: 2021-09-29

## 2021-09-24 NOTE — PROGRESS NOTES
This is a telemedicine visit with live, interactive video and audio. Patient understands and accepts financial responsibility for any deductible, co-insurance and/or co-pays associated with this service.     This care is provided during an unprecedented 2016   • Medial epicondylitis of right elbow 2/27/2019   • Menses painful    • Mild intermittent asthma without complication 9/78/6635   • Nausea 09/30/2018   • Painful swallowing 07/01/2018   • Problems with swallowing 07/01/2018   • Stool incontinence 20 Take 1 tablet (10 mg total) by mouth nightly. 90 tablet 3   • Ergocalciferol (VITAMIN D OR) Take 1,000 Int'l Units by mouth daily. • PREMPRO 0.45-1.5 MG Oral Tab Take 1 tablet by mouth daily.      • BACLOFEN 20 MG Oral Tab TAKE 1 TABLET BY MOUTH TWICE D

## 2021-09-24 NOTE — TELEPHONE ENCOUNTER
Future Appointments   Date Time Provider Doroteo Spann   9/24/2021  4:00 PM Stephani Taylor MD [facility] Cindy Phillips     VV for 4:20 PM - please adjust schedule time? Thank you!

## 2021-09-24 NOTE — TELEPHONE ENCOUNTER
PT HAS SENT OVER "[employer] MESSAGE THAT SHE HAS HER ANNUAL SINUS INFECTION. WANTED OFFICE VISIT ON Monday. CALLED PT TO ADV THAT WE ARE NOT BRINGING IN ANY PT INTO OFFICE WITH ANY SICK SYMPTOMS. PT WOULD LIKE TO KNOW IF ANYWHERE TO SQUEEZE IN FOR VV.     PLE

## 2021-11-26 ENCOUNTER — IMMUNIZATION (OUTPATIENT)
Dept: FAMILY MEDICINE CLINIC | Facility: CLINIC | Age: 56
End: 2021-11-26
Payer: COMMERCIAL

## 2021-11-26 DIAGNOSIS — Z23 NEED FOR VACCINATION: Primary | ICD-10-CM

## 2021-11-26 PROCEDURE — 90471 IMMUNIZATION ADMIN: CPT | Performed by: FAMILY MEDICINE

## 2021-11-26 PROCEDURE — 90686 IIV4 VACC NO PRSV 0.5 ML IM: CPT | Performed by: FAMILY MEDICINE

## 2021-11-30 RX ORDER — ALBUTEROL SULFATE 90 UG/1
AEROSOL, METERED RESPIRATORY (INHALATION)
Qty: 18 G | Refills: 0 | Status: SHIPPED | OUTPATIENT
Start: 2021-11-30

## 2022-02-17 ENCOUNTER — HOSPITAL ENCOUNTER (OUTPATIENT)
Dept: GENERAL RADIOLOGY | Age: 57
Discharge: HOME | End: 2022-02-17
Attending: FAMILY MEDICINE
Payer: COMMERCIAL

## 2022-02-17 ENCOUNTER — OFFICE VISIT (OUTPATIENT)
Dept: FAMILY MEDICINE CLINIC | Facility: CLINIC | Age: 57
End: 2022-02-17
Payer: COMMERCIAL

## 2022-02-17 VITALS
BODY MASS INDEX: 29.58 KG/M2 | SYSTOLIC BLOOD PRESSURE: 126 MMHG | HEART RATE: 87 BPM | TEMPERATURE: 98 F | DIASTOLIC BLOOD PRESSURE: 72 MMHG | OXYGEN SATURATION: 99 % | HEIGHT: 64 IN | WEIGHT: 173.25 LBS | RESPIRATION RATE: 18 BRPM

## 2022-02-17 DIAGNOSIS — M25.562 ACUTE PAIN OF LEFT KNEE: ICD-10-CM

## 2022-02-17 DIAGNOSIS — M25.561 PAIN AND SWELLING OF RIGHT KNEE: ICD-10-CM

## 2022-02-17 DIAGNOSIS — M25.461 PAIN AND SWELLING OF RIGHT KNEE: ICD-10-CM

## 2022-02-17 DIAGNOSIS — M11.20 CHONDROCALCINOSIS: Primary | ICD-10-CM

## 2022-02-17 PROCEDURE — 3008F BODY MASS INDEX DOCD: CPT | Performed by: FAMILY MEDICINE

## 2022-02-17 PROCEDURE — 73562 X-RAY EXAM OF KNEE 3: CPT | Performed by: FAMILY MEDICINE

## 2022-02-17 PROCEDURE — 99214 OFFICE O/P EST MOD 30 MIN: CPT | Performed by: FAMILY MEDICINE

## 2022-02-17 PROCEDURE — 3074F SYST BP LT 130 MM HG: CPT | Performed by: FAMILY MEDICINE

## 2022-02-17 PROCEDURE — 3078F DIAST BP <80 MM HG: CPT | Performed by: FAMILY MEDICINE

## 2022-02-17 RX ORDER — DICLOFENAC SODIUM 75 MG/1
75 TABLET, DELAYED RELEASE ORAL 2 TIMES DAILY
Qty: 20 TABLET | Refills: 0 | Status: SHIPPED | OUTPATIENT
Start: 2022-02-17 | End: 2022-02-27

## 2022-03-28 ENCOUNTER — PATIENT MESSAGE (OUTPATIENT)
Dept: FAMILY MEDICINE CLINIC | Facility: CLINIC | Age: 57
End: 2022-03-28

## 2022-03-28 NOTE — TELEPHONE ENCOUNTER
From: Seun Harris  To: Vinay Recinos MD  Sent: 3/28/2022 12:01 PM CDT  Subject: Right knee swelling     Good afternoon Doctor Roxane Zuniga having right knee swelling again. No new injuries, just swelling again and pain. The swelling went mostly done with anti inflammatory medication you prescribed last time. Just wanted to let you know and see if we should do anything new? Thank you !    Maribel Jernigan

## 2022-03-28 NOTE — TELEPHONE ENCOUNTER
LOV 02/17/2022 - left knee pain  Knee xray completed  Rx diclofenac     Pt now c/o right knee pain/swelling?  [facility] sent to pt to clarify      Pt clarified - still c/o LEFT knee pain/swelling    Please advise, thank you

## 2022-03-28 NOTE — TELEPHONE ENCOUNTER
Dr. Brianne Toro wanted her to follow up if sx persisting. Would recommend ice, elevation, ACE wrap, and Aleve BID. Should schedule follow up.

## 2022-05-15 ENCOUNTER — OFFICE VISIT (OUTPATIENT)
Dept: FAMILY MEDICINE CLINIC | Facility: CLINIC | Age: 57
End: 2022-05-15
Payer: COMMERCIAL

## 2022-05-15 VITALS
OXYGEN SATURATION: 98 % | DIASTOLIC BLOOD PRESSURE: 72 MMHG | TEMPERATURE: 98 F | HEIGHT: 64 IN | SYSTOLIC BLOOD PRESSURE: 144 MMHG | BODY MASS INDEX: 28.51 KG/M2 | RESPIRATION RATE: 12 BRPM | WEIGHT: 167 LBS | HEART RATE: 96 BPM

## 2022-05-15 DIAGNOSIS — R05.9 COUGH: ICD-10-CM

## 2022-05-15 DIAGNOSIS — J06.9 VIRAL URI WITH COUGH: Primary | ICD-10-CM

## 2022-05-15 DIAGNOSIS — J04.0 LARYNGITIS: ICD-10-CM

## 2022-05-15 LAB
OPERATOR ID: NORMAL
RAPID SARS-COV-2 BY PCR: NOT DETECTED

## 2022-05-15 PROCEDURE — 87637 SARSCOV2&INF A&B&RSV AMP PRB: CPT | Performed by: FAMILY MEDICINE

## 2022-05-15 RX ORDER — MONTELUKAST SODIUM 10 MG/1
TABLET ORAL
COMMUNITY
Start: 2022-05-10

## 2022-05-15 RX ORDER — BENZONATATE 200 MG/1
200 CAPSULE ORAL 3 TIMES DAILY PRN
Qty: 30 CAPSULE | Refills: 0 | Status: SHIPPED | OUTPATIENT
Start: 2022-05-15

## 2022-05-15 RX ORDER — PREDNISONE 20 MG/1
TABLET ORAL
Qty: 6 TABLET | Refills: 0 | Status: SHIPPED | OUTPATIENT
Start: 2022-05-15

## 2022-05-15 RX ORDER — ALBUTEROL SULFATE 90 UG/1
2 AEROSOL, METERED RESPIRATORY (INHALATION) EVERY 4 HOURS PRN
Qty: 1 EACH | Refills: 0 | Status: SHIPPED | OUTPATIENT
Start: 2022-05-15

## 2022-05-16 LAB
FLUAV + FLUBV RNA SPEC NAA+PROBE: NOT DETECTED
FLUAV + FLUBV RNA SPEC NAA+PROBE: NOT DETECTED
RSV RNA SPEC NAA+PROBE: NOT DETECTED
SARS-COV-2 RNA RESP QL NAA+PROBE: NOT DETECTED

## 2022-05-17 ENCOUNTER — TELEPHONE (OUTPATIENT)
Dept: FAMILY MEDICINE CLINIC | Facility: CLINIC | Age: 57
End: 2022-05-17

## 2022-05-17 NOTE — TELEPHONE ENCOUNTER
PT CALLED TO ADV WAS SEEN AT [facility] ON 5/15. PT ADV NOTHING WAS PRESCRIBED AND WAS LOOKING TO SEE IF PT CAN GET VV OR BE SEEN IN OFFICE, PT WOULD LIKE TO SEE IF SOMETHING CAN BE CALLED.       PLEASE CALL AND ADV      [id number] [facility]

## 2022-05-17 NOTE — TELEPHONE ENCOUNTER
Patient states that she has been doing the prednisone, inhaler and cough medicine that was prescribed. She was advised to continue her allergey and nasal spray as well. Patient states that she can't get rid of the headache. He face and teeth hurt. Has a raspy voice. She states that he chest feels heavy. Can anything else be prescribed or can she do a video visit?

## 2022-05-17 NOTE — TELEPHONE ENCOUNTER
Patient advised of the information per  . Dr. Neetu Fitzgerald advised 11:40. Appointment made. Nilda Llanos verbally consents to a Virtual/Telephone Check-In service on 5 18 2022. Patient understands and accepts financial responsibility for any deductible, co-insurance and/or co-pays associated with Melissa Ochoa verbally consents to a Virtual/Telephone Check-In service on 5 18 2022  Patient understands and accepts financial responsibility for any deductible, co-insurance and/or co-pays associated with this service. Nilda Llanos verbally consents to a Virtual/Telephone Check-In service on 5 18 2022. Patient understands and accepts financial responsibility for any deductible, co-insurance and/or co-pays associated with this service.  s service

## 2022-05-18 ENCOUNTER — TELEMEDICINE (OUTPATIENT)
Dept: FAMILY MEDICINE CLINIC | Facility: CLINIC | Age: 57
End: 2022-05-18

## 2022-05-18 DIAGNOSIS — H92.02 LEFT EAR PAIN: ICD-10-CM

## 2022-05-18 DIAGNOSIS — R51.9 LEFT FACIAL PRESSURE AND PAIN: Primary | ICD-10-CM

## 2022-05-18 DIAGNOSIS — K12.2 UVULITIS: ICD-10-CM

## 2022-05-18 DIAGNOSIS — J98.01 COUGH DUE TO BRONCHOSPASM: ICD-10-CM

## 2022-05-18 DIAGNOSIS — J04.0 LARYNGITIS: ICD-10-CM

## 2022-05-18 PROCEDURE — 99213 OFFICE O/P EST LOW 20 MIN: CPT | Performed by: FAMILY MEDICINE

## 2022-05-18 RX ORDER — AZITHROMYCIN 250 MG/1
TABLET, FILM COATED ORAL
Qty: 6 TABLET | Refills: 0 | Status: SHIPPED | OUTPATIENT
Start: 2022-05-18 | End: 2022-05-23

## 2022-07-13 PROBLEM — M62.838 MUSCLE SPASM: Status: ACTIVE | Noted: 2022-04-19

## 2022-07-13 PROBLEM — G25.81 RESTLESS LEG: Status: ACTIVE | Noted: 2022-04-19

## 2022-07-19 ENCOUNTER — HOSPITAL ENCOUNTER (OUTPATIENT)
Dept: GENERAL RADIOLOGY | Age: 57
Discharge: HOME | End: 2022-07-19
Payer: COMMERCIAL

## 2022-07-19 DIAGNOSIS — R19.7 DIARRHEA, UNSPECIFIED TYPE: ICD-10-CM

## 2022-07-19 DIAGNOSIS — R10.31 ABDOMINAL CRAMPING, BILATERAL LOWER QUADRANT: ICD-10-CM

## 2022-07-19 DIAGNOSIS — R15.2 FECAL URGENCY: ICD-10-CM

## 2022-07-19 DIAGNOSIS — R10.32 ABDOMINAL CRAMPING, BILATERAL LOWER QUADRANT: ICD-10-CM

## 2022-07-19 PROCEDURE — 74018 RADEX ABDOMEN 1 VIEW: CPT

## 2022-07-26 ENCOUNTER — LAB ENCOUNTER (OUTPATIENT)
Dept: LAB | Age: 57
End: 2022-07-26
Payer: COMMERCIAL

## 2022-07-26 ENCOUNTER — HOSPITAL ENCOUNTER (OUTPATIENT)
Dept: CT IMAGING | Age: 57
Discharge: HOME | End: 2022-07-26
Payer: COMMERCIAL

## 2022-07-26 DIAGNOSIS — I72.8 SPLENIC ARTERY ANEURYSM (HCC): ICD-10-CM

## 2022-07-26 DIAGNOSIS — R93.3 ABNORMAL FINDING ON GI TRACT IMAGING: ICD-10-CM

## 2022-07-26 DIAGNOSIS — R19.7 DIARRHEA, UNSPECIFIED TYPE: ICD-10-CM

## 2022-07-26 LAB — CREAT BLD-MCNC: 0.7 MG/DL

## 2022-07-26 PROCEDURE — 87493 C DIFF AMPLIFIED PROBE: CPT

## 2022-07-26 PROCEDURE — 89055 LEUKOCYTE ASSESSMENT FECAL: CPT

## 2022-07-26 PROCEDURE — 82705 FATS/LIPIDS FECES QUAL: CPT

## 2022-07-26 PROCEDURE — 74175 CTA ABDOMEN W/CONTRAST: CPT

## 2022-07-26 PROCEDURE — 82565 ASSAY OF CREATININE: CPT

## 2022-07-26 PROCEDURE — 82656 EL-1 FECAL QUAL/SEMIQ: CPT

## 2022-07-26 RX ORDER — IOHEXOL 350 MG/ML
100 INJECTION, SOLUTION INTRAVENOUS
Status: COMPLETED | OUTPATIENT
Start: 2022-07-26 | End: 2022-07-26

## 2022-07-26 RX ADMIN — IOHEXOL 100 ML: 350 INJECTION, SOLUTION INTRAVENOUS at 16:16:00

## 2022-07-27 LAB — C DIFF TOX B STL QL: POSITIVE

## 2022-07-29 LAB
NEUTRAL FAT, FECAL: NORMAL
PANCREATIC ELASTASE , FECAL: 670 UG/G
SPLIT FAT, FECAL: NORMAL

## 2022-08-05 ENCOUNTER — TELEPHONE (OUTPATIENT)
Dept: FAMILY MEDICINE CLINIC | Facility: CLINIC | Age: 57
End: 2022-08-05

## 2022-08-05 NOTE — TELEPHONE ENCOUNTER
----- Message from Stephani Farmer MD sent at 8/4/2022 10:58 PM CDT -----  Please schedule annual physical. She is past due. Need to catch up with preventive care and discuss family hx of aneurysm - may need further imaging.      Please have her scheduled for annual physical in the next 1 month.     ----- Message -----  From: Aydee Norton MD  Sent: 8/4/2022  11:05 AM CDT  To: Iva Jay MD    Saw Arlyn today  Not concerned about splenic aneurysm - she needs a CT in 3 years  She does have family history of brain aneurysm - she may benefit from screening mri/mra brain

## 2022-08-05 NOTE — TELEPHONE ENCOUNTER
[facility] sent to pt - pt needing to schedule annual physical appt  Notify me if not read by 08/12/22

## 2022-08-19 ENCOUNTER — OFFICE VISIT (OUTPATIENT)
Dept: FAMILY MEDICINE CLINIC | Facility: CLINIC | Age: 57
End: 2022-08-19
Payer: COMMERCIAL

## 2022-08-19 VITALS
TEMPERATURE: 98 F | WEIGHT: 168.13 LBS | BODY MASS INDEX: 28.7 KG/M2 | OXYGEN SATURATION: 98 % | RESPIRATION RATE: 16 BRPM | HEART RATE: 79 BPM | SYSTOLIC BLOOD PRESSURE: 126 MMHG | HEIGHT: 64 IN | DIASTOLIC BLOOD PRESSURE: 74 MMHG

## 2022-08-19 DIAGNOSIS — R73.09 ELEVATED HEMOGLOBIN A1C: ICD-10-CM

## 2022-08-19 DIAGNOSIS — Z86.61 HISTORY OF MYELITIS: ICD-10-CM

## 2022-08-19 DIAGNOSIS — R73.03 PREDIABETES: ICD-10-CM

## 2022-08-19 DIAGNOSIS — Z86.32 HISTORY OF GESTATIONAL DIABETES: ICD-10-CM

## 2022-08-19 DIAGNOSIS — Z82.49 FAMILY HISTORY OF BRAIN ANEURYSM: ICD-10-CM

## 2022-08-19 DIAGNOSIS — Z13.29 SCREENING FOR THYROID DISORDER: ICD-10-CM

## 2022-08-19 DIAGNOSIS — Z23 NEED FOR VACCINATION: ICD-10-CM

## 2022-08-19 DIAGNOSIS — Z00.00 ANNUAL PHYSICAL EXAM: Primary | ICD-10-CM

## 2022-08-19 DIAGNOSIS — I72.8 SPLENIC ARTERY ANEURYSM (HCC): ICD-10-CM

## 2022-08-19 DIAGNOSIS — Z12.31 SCREENING MAMMOGRAM FOR BREAST CANCER: ICD-10-CM

## 2022-08-19 DIAGNOSIS — R03.0 ELEVATED BLOOD PRESSURE READING: ICD-10-CM

## 2022-08-19 DIAGNOSIS — A04.72 COLITIS DUE TO CLOSTRIDIUM DIFFICILE: ICD-10-CM

## 2022-08-19 LAB
ALBUMIN SERPL-MCNC: 4.3 G/DL (ref 3.4–5)
ALBUMIN/GLOB SERPL: 1.3 {RATIO} (ref 1–2)
ALP LIVER SERPL-CCNC: 75 U/L
ALT SERPL-CCNC: 39 U/L
ANION GAP SERPL CALC-SCNC: 2 MMOL/L (ref 0–18)
AST SERPL-CCNC: 19 U/L (ref 15–37)
BASOPHILS # BLD AUTO: 0.08 X10(3) UL (ref 0–0.2)
BASOPHILS NFR BLD AUTO: 0.9 %
BILIRUB SERPL-MCNC: 0.6 MG/DL (ref 0.1–2)
BUN BLD-MCNC: 11 MG/DL (ref 7–18)
CALCIUM BLD-MCNC: 9.8 MG/DL (ref 8.5–10.1)
CHLORIDE SERPL-SCNC: 109 MMOL/L (ref 98–112)
CHOLEST SERPL-MCNC: 210 MG/DL (ref ?–200)
CO2 SERPL-SCNC: 27 MMOL/L (ref 21–32)
CREAT BLD-MCNC: 0.68 MG/DL
EOSINOPHIL # BLD AUTO: 0.18 X10(3) UL (ref 0–0.7)
EOSINOPHIL NFR BLD AUTO: 2 %
ERYTHROCYTE [DISTWIDTH] IN BLOOD BY AUTOMATED COUNT: 12.4 %
EST. AVERAGE GLUCOSE BLD GHB EST-MCNC: 114 MG/DL (ref 68–126)
FASTING PATIENT LIPID ANSWER: YES
FASTING STATUS PATIENT QL REPORTED: YES
GFR SERPLBLD BASED ON 1.73 SQ M-ARVRAT: 103 ML/MIN/1.73M2 (ref 60–?)
GLOBULIN PLAS-MCNC: 3.4 G/DL (ref 2.8–4.4)
GLUCOSE BLD-MCNC: 94 MG/DL (ref 70–99)
HBA1C MFR BLD: 5.6 % (ref ?–5.7)
HCT VFR BLD AUTO: 45.3 %
HDLC SERPL-MCNC: 40 MG/DL (ref 40–59)
HGB BLD-MCNC: 14.6 G/DL
IMM GRANULOCYTES # BLD AUTO: 0.02 X10(3) UL (ref 0–1)
IMM GRANULOCYTES NFR BLD: 0.2 %
LDLC SERPL CALC-MCNC: 149 MG/DL (ref ?–100)
LYMPHOCYTES # BLD AUTO: 1.61 X10(3) UL (ref 1–4)
LYMPHOCYTES NFR BLD AUTO: 17.6 %
MCH RBC QN AUTO: 28.6 PG (ref 26–34)
MCHC RBC AUTO-ENTMCNC: 32.2 G/DL (ref 31–37)
MCV RBC AUTO: 88.8 FL
MONOCYTES # BLD AUTO: 0.95 X10(3) UL (ref 0.1–1)
MONOCYTES NFR BLD AUTO: 10.4 %
NEUTROPHILS # BLD AUTO: 6.29 X10 (3) UL (ref 1.5–7.7)
NEUTROPHILS # BLD AUTO: 6.29 X10(3) UL (ref 1.5–7.7)
NEUTROPHILS NFR BLD AUTO: 68.9 %
NONHDLC SERPL-MCNC: 170 MG/DL (ref ?–130)
OSMOLALITY SERPL CALC.SUM OF ELEC: 285 MOSM/KG (ref 275–295)
PLATELET # BLD AUTO: 310 10(3)UL (ref 150–450)
POTASSIUM SERPL-SCNC: 3.9 MMOL/L (ref 3.5–5.1)
PROT SERPL-MCNC: 7.7 G/DL (ref 6.4–8.2)
RBC # BLD AUTO: 5.1 X10(6)UL
SODIUM SERPL-SCNC: 138 MMOL/L (ref 136–145)
TRIGL SERPL-MCNC: 116 MG/DL (ref 30–149)
TSI SER-ACNC: 0.67 MIU/ML (ref 0.36–3.74)
VLDLC SERPL CALC-MCNC: 22 MG/DL (ref 0–30)
WBC # BLD AUTO: 9.1 X10(3) UL (ref 4–11)

## 2022-08-19 PROCEDURE — 85025 COMPLETE CBC W/AUTO DIFF WBC: CPT | Performed by: FAMILY MEDICINE

## 2022-08-19 PROCEDURE — 3074F SYST BP LT 130 MM HG: CPT | Performed by: FAMILY MEDICINE

## 2022-08-19 PROCEDURE — 3008F BODY MASS INDEX DOCD: CPT | Performed by: FAMILY MEDICINE

## 2022-08-19 PROCEDURE — 99213 OFFICE O/P EST LOW 20 MIN: CPT | Performed by: FAMILY MEDICINE

## 2022-08-19 PROCEDURE — 99396 PREV VISIT EST AGE 40-64: CPT | Performed by: FAMILY MEDICINE

## 2022-08-19 PROCEDURE — 84443 ASSAY THYROID STIM HORMONE: CPT | Performed by: FAMILY MEDICINE

## 2022-08-19 PROCEDURE — 80053 COMPREHEN METABOLIC PANEL: CPT | Performed by: FAMILY MEDICINE

## 2022-08-19 PROCEDURE — 83036 HEMOGLOBIN GLYCOSYLATED A1C: CPT | Performed by: FAMILY MEDICINE

## 2022-08-19 PROCEDURE — 80061 LIPID PANEL: CPT | Performed by: FAMILY MEDICINE

## 2022-08-19 PROCEDURE — 3078F DIAST BP <80 MM HG: CPT | Performed by: FAMILY MEDICINE

## 2022-08-20 ENCOUNTER — MED REC SCAN ONLY (OUTPATIENT)
Dept: FAMILY MEDICINE CLINIC | Facility: CLINIC | Age: 57
End: 2022-08-20

## 2022-09-02 ENCOUNTER — NURSE ONLY (OUTPATIENT)
Dept: FAMILY MEDICINE CLINIC | Facility: CLINIC | Age: 57
End: 2022-09-02
Payer: COMMERCIAL

## 2022-09-02 VITALS — DIASTOLIC BLOOD PRESSURE: 70 MMHG | SYSTOLIC BLOOD PRESSURE: 128 MMHG

## 2022-09-02 PROCEDURE — 3074F SYST BP LT 130 MM HG: CPT | Performed by: FAMILY MEDICINE

## 2022-09-02 PROCEDURE — 3078F DIAST BP <80 MM HG: CPT | Performed by: FAMILY MEDICINE

## 2022-09-02 NOTE — PROGRESS NOTES
Keven Mujica present in office for nurse visit.   BP check     Pt reports BP machine from home is about 3years old - home BP readings 140s/70s    Machine BP right arm - 141/66    Machine BP left arm - 150/70

## 2022-09-02 NOTE — PROGRESS NOTES
Checked patient's BP in both arms. Pt was seated in chair, arm at heart level, resting on obrien stand with a pillow to support arm. Used larger adult cuff. Right arm: 130/68  Left arm: 128/70    Pt rechecked her bp machine: 142/67    Pt will be purchasing a new bp machine. She will be returning in a couple weeks to have this rechecked to make sure readings are accurate. She would also like to know what Dr. Matthew Cordova advises based on todays nurse visit.

## 2022-09-15 ENCOUNTER — TELEPHONE (OUTPATIENT)
Dept: FAMILY MEDICINE CLINIC | Facility: CLINIC | Age: 57
End: 2022-09-15

## 2022-09-15 ENCOUNTER — PATIENT MESSAGE (OUTPATIENT)
Dept: FAMILY MEDICINE CLINIC | Facility: CLINIC | Age: 57
End: 2022-09-15

## 2022-09-15 RX ORDER — DICLOFENAC SODIUM 75 MG/1
75 TABLET, DELAYED RELEASE ORAL 2 TIMES DAILY PRN
Qty: 30 TABLET | Refills: 0 | Status: SHIPPED | OUTPATIENT
Start: 2022-09-15 | End: 2022-09-30

## 2022-09-15 NOTE — TELEPHONE ENCOUNTER
See previous encounter    Knee is now hot to touch, swollen    Patient has 99.6 fever    Patient feels nauseous    Please adv    Thank you

## 2022-09-15 NOTE — TELEPHONE ENCOUNTER
Discussed with Dr. Anurag Villafana regarding note below - please send to [address] [place] [address] for evaluation    Advised patient of Doctor's note above. Patient verbalized understanding. Advised pt to have someone drive her to  - she reports spouse will be home in about half hour - will take to [address] [place] [address]. No further questions at this time.     Dr. Anurag Villafana notified of note above

## 2022-09-15 NOTE — TELEPHONE ENCOUNTER
LOV 08/19/22 with Dr. Berenice Barahona - annual physical    Last office visit regarding left knee swelling/pain - 02/17/22  Xray left knee completed  Rx diclofenac 75 MG Oral Tab EC for 10 days    Please review pt's [facility]    Please advise, thank you

## 2022-09-17 ENCOUNTER — TELEPHONE (OUTPATIENT)
Dept: FAMILY MEDICINE CLINIC | Facility: CLINIC | Age: 57
End: 2022-09-17

## 2022-09-17 NOTE — TELEPHONE ENCOUNTER
Patient was seen at [facility] ER yesterday for infection of knee     They drained fluid and did cultures    Patient was not given any antibiotics or meds    She was told to follow up with pcp    Please adv    Thank you

## 2022-09-17 NOTE — TELEPHONE ENCOUNTER
Pt states today knee is feeling \"rough\"    She woke up this morning and ice pack was warm and she hadn't had tylenol. She is starting to feel better as long as she stays off it    Pt started Dicolgenac BID and Tylenol in between and using ice. The ER drained fluid and sent for culture- no ABX were provided on discharge    Pt states her BP has also been high- Thursday /107 machine, racheal she left 160/80    Friday in the /90s per pt- pt states last night at 170/106 but she was in pain. What is in chart is not accurate per pt    Pt states the IC note from Thursday has much more information regarding the BP. This morning 158/88 that is when pain is controled. Pt is wondering if she needs to be seen this week?

## 2022-09-21 ENCOUNTER — OFFICE VISIT (OUTPATIENT)
Dept: FAMILY MEDICINE CLINIC | Facility: CLINIC | Age: 57
End: 2022-09-21

## 2022-09-21 VITALS
TEMPERATURE: 98 F | OXYGEN SATURATION: 97 % | BODY MASS INDEX: 29 KG/M2 | WEIGHT: 168.5 LBS | HEART RATE: 94 BPM | DIASTOLIC BLOOD PRESSURE: 72 MMHG | RESPIRATION RATE: 18 BRPM | SYSTOLIC BLOOD PRESSURE: 128 MMHG

## 2022-09-21 DIAGNOSIS — I10 ESSENTIAL HYPERTENSION: ICD-10-CM

## 2022-09-21 DIAGNOSIS — F41.8 ANXIETY ABOUT HEALTH: ICD-10-CM

## 2022-09-21 DIAGNOSIS — M25.462 EFFUSION OF LEFT KNEE JOINT: Primary | ICD-10-CM

## 2022-09-21 PROCEDURE — 3078F DIAST BP <80 MM HG: CPT | Performed by: FAMILY MEDICINE

## 2022-09-21 PROCEDURE — 99214 OFFICE O/P EST MOD 30 MIN: CPT | Performed by: FAMILY MEDICINE

## 2022-09-21 PROCEDURE — 3074F SYST BP LT 130 MM HG: CPT | Performed by: FAMILY MEDICINE

## 2022-09-21 RX ORDER — ALPRAZOLAM 0.5 MG/1
0.5 TABLET ORAL NIGHTLY PRN
Qty: 10 TABLET | Refills: 0 | Status: SHIPPED | OUTPATIENT
Start: 2022-09-21

## 2022-09-21 RX ORDER — LISINOPRIL 10 MG/1
10 TABLET ORAL DAILY
Qty: 90 TABLET | Refills: 0 | Status: SHIPPED | OUTPATIENT
Start: 2022-09-21 | End: 2022-12-20

## 2022-09-29 ENCOUNTER — PATIENT MESSAGE (OUTPATIENT)
Dept: FAMILY MEDICINE CLINIC | Facility: CLINIC | Age: 57
End: 2022-09-29

## 2022-09-29 DIAGNOSIS — M25.469 KNEE SWELLING: Primary | ICD-10-CM

## 2022-09-29 NOTE — TELEPHONE ENCOUNTER
From: Jerad Potter  To: Zoey Larsen MD  Sent: 9/29/2022 10:08 AM CDT  Subject: Blood pressure readings     Good morning. Sending my blood pressure readings as you requested. 9/21 started lisinopril   9/22 am 134/79 pm 138/82  9/23 am 134/77 pm 147/74  9/24 am 135/80 pm 146/82   9/25 am 141/76 pm 148/74  9/26 am 136/83 pm 147/85   9/27 am 138/80 pm 142/78   9/28 am 143/78 pm 150/82     Knee swelling is much better. Just wondering if you received MRI results ?  The Ortho was wondering if you would refer or not to Rheumatologist?     Thank you,   Maia Hall

## 2022-10-11 ENCOUNTER — PATIENT MESSAGE (OUTPATIENT)
Dept: FAMILY MEDICINE CLINIC | Facility: CLINIC | Age: 57
End: 2022-10-11

## 2022-10-11 RX ORDER — LISINOPRIL 10 MG/1
10 TABLET ORAL DAILY
Qty: 90 TABLET | Refills: 0 | OUTPATIENT
Start: 2022-10-11 | End: 2023-01-09

## 2022-10-11 RX ORDER — LISINOPRIL 20 MG/1
20 TABLET ORAL DAILY
Qty: 90 TABLET | Refills: 0 | Status: SHIPPED | OUTPATIENT
Start: 2022-10-11 | End: 2023-01-09

## 2022-10-11 NOTE — TELEPHONE ENCOUNTER
From: Tasha Tidwell  To: Yuko Mars MD  Sent: 10/11/2022 10:22 AM CDT  Subject: Blood pressure reading and refill Lisinopril     Good morning. Blood pressure readings for last 10 days. I also need an refill on Lisinopril. 10/2 started taking 20mg Lisinopril as you recommended:   10/2 am 139/75 pm 143/78   10/3 am 134/83 pm 149/88  10/4 am 128/79 pm 140/80  10/5 am 133/73 pm 143/83   10/6 am 128/77 pm 150/90   10/7 am 130/75 pm 140/83   10/8 am 130/78   10/9 am 112/67 pm 142/80   10/10 am 132/76 pm 131 74   10/11 am 130/79     Thanks and have a good day!    Alex Bernstein

## 2022-10-12 ENCOUNTER — HOSPITAL ENCOUNTER (OUTPATIENT)
Dept: MAMMOGRAPHY | Age: 57
Discharge: HOME | End: 2022-10-12
Attending: FAMILY MEDICINE
Payer: COMMERCIAL

## 2022-10-12 DIAGNOSIS — Z12.31 SCREENING MAMMOGRAM FOR BREAST CANCER: ICD-10-CM

## 2022-10-12 PROCEDURE — 77063 BREAST TOMOSYNTHESIS BI: CPT | Performed by: FAMILY MEDICINE

## 2022-10-12 PROCEDURE — 77067 SCR MAMMO BI INCL CAD: CPT | Performed by: FAMILY MEDICINE

## 2022-10-19 ENCOUNTER — TELEPHONE (OUTPATIENT)
Dept: FAMILY MEDICINE CLINIC | Facility: CLINIC | Age: 57
End: 2022-10-19

## 2022-10-19 ENCOUNTER — LAB ENCOUNTER (OUTPATIENT)
Dept: LAB | Age: 57
End: 2022-10-19
Attending: INTERNAL MEDICINE
Payer: COMMERCIAL

## 2022-10-19 DIAGNOSIS — Z01.812 ENCOUNTER FOR PREOPERATIVE SCREENING LABORATORY TESTING FOR COVID-19 VIRUS: ICD-10-CM

## 2022-10-19 DIAGNOSIS — Z20.822 ENCOUNTER FOR PREOPERATIVE SCREENING LABORATORY TESTING FOR COVID-19 VIRUS: ICD-10-CM

## 2022-10-19 NOTE — TELEPHONE ENCOUNTER
----- Message from Jenn Daniels MD sent at 10/18/2022  4:20 PM CDT -----  Please let patient know or leave message that her mammogram showed no radiographic evidence of cancer.  Repeat Mammogram in 1 year, thanks

## 2022-10-20 LAB — SARS-COV-2 RNA RESP QL NAA+PROBE: DETECTED

## 2022-12-07 RX ORDER — LISINOPRIL 20 MG/1
TABLET ORAL
Qty: 90 TABLET | Refills: 0 | Status: SHIPPED | OUTPATIENT
Start: 2022-12-07

## 2022-12-07 NOTE — TELEPHONE ENCOUNTER
LOV 09/21/22  BP Readings from Last 3 Encounters:  09/21/22 : 128/72  09/02/22 : 128/70  08/19/22 : 126/74      Last refill on 10/11/2022, for #90 tabs, with 0 refills  lisinopril 20 MG Oral Tab    Hypertension Medications Protocol Passed 12/06/2022 05:43 PM   Protocol Details  CMP or BMP in past 12 months    Last serum creatinine< 2.0    Appointment in past 6 or next 3 months         Future Appointments   Date Time Provider Doroteo Spann   2/1/2023  3:30 PM Grace Barba, DO [facility]   2/21/2023  2:45 PM John Chapin, DO [facility]     Order per protocol

## 2022-12-08 ENCOUNTER — IMMUNIZATION (OUTPATIENT)
Dept: FAMILY MEDICINE CLINIC | Facility: CLINIC | Age: 57
End: 2022-12-08
Payer: COMMERCIAL

## 2022-12-08 DIAGNOSIS — Z23 NEED FOR VACCINATION: Primary | ICD-10-CM

## 2022-12-08 PROCEDURE — 90686 IIV4 VACC NO PRSV 0.5 ML IM: CPT | Performed by: FAMILY MEDICINE

## 2022-12-08 PROCEDURE — 90471 IMMUNIZATION ADMIN: CPT | Performed by: FAMILY MEDICINE

## 2022-12-29 ENCOUNTER — OFFICE VISIT (OUTPATIENT)
Dept: FAMILY MEDICINE CLINIC | Facility: CLINIC | Age: 57
End: 2022-12-29
Payer: COMMERCIAL

## 2022-12-29 VITALS
WEIGHT: 171.63 LBS | DIASTOLIC BLOOD PRESSURE: 84 MMHG | TEMPERATURE: 98 F | HEART RATE: 88 BPM | OXYGEN SATURATION: 97 % | SYSTOLIC BLOOD PRESSURE: 158 MMHG | BODY MASS INDEX: 28.94 KG/M2 | HEIGHT: 64.5 IN

## 2022-12-29 DIAGNOSIS — I10 PRIMARY HYPERTENSION: Primary | ICD-10-CM

## 2022-12-29 DIAGNOSIS — F41.8 ANXIETY ABOUT HEALTH: ICD-10-CM

## 2022-12-29 DIAGNOSIS — R05.8 OTHER COUGH: ICD-10-CM

## 2022-12-29 PROCEDURE — 3008F BODY MASS INDEX DOCD: CPT | Performed by: FAMILY MEDICINE

## 2022-12-29 PROCEDURE — 3079F DIAST BP 80-89 MM HG: CPT | Performed by: FAMILY MEDICINE

## 2022-12-29 PROCEDURE — 3077F SYST BP >= 140 MM HG: CPT | Performed by: FAMILY MEDICINE

## 2022-12-29 PROCEDURE — 99213 OFFICE O/P EST LOW 20 MIN: CPT | Performed by: FAMILY MEDICINE

## 2022-12-29 RX ORDER — METOPROLOL SUCCINATE 25 MG/1
25 TABLET, EXTENDED RELEASE ORAL DAILY
Qty: 30 TABLET | Refills: 0 | Status: SHIPPED | OUTPATIENT
Start: 2022-12-29 | End: 2023-01-28

## 2023-01-04 RX ORDER — MONTELUKAST SODIUM 10 MG/1
10 TABLET ORAL NIGHTLY
Qty: 90 TABLET | Refills: 0 | Status: SHIPPED | OUTPATIENT
Start: 2023-01-04 | End: 2023-04-04

## 2023-01-23 DIAGNOSIS — I10 PRIMARY HYPERTENSION: ICD-10-CM

## 2023-01-23 RX ORDER — METOPROLOL SUCCINATE 25 MG/1
TABLET, EXTENDED RELEASE ORAL
Qty: 30 TABLET | Refills: 0 | Status: SHIPPED | OUTPATIENT
Start: 2023-01-23

## 2023-01-23 NOTE — TELEPHONE ENCOUNTER
Hypertension Medications Protocol Passed 01/23/2023 10:41 AM   Protocol Details  CMP or BMP in past 12 months    Last serum creatinine< 2.0    Appointment in past 6 or next 3 months     Last OV 12/29/22  Last lab 9/16/22 cmp/creat 0.62  Last refilled 12/29/22  #30  0 refills      Due for f/u next month, refilled x 30 days

## 2023-02-01 ENCOUNTER — OFFICE VISIT (OUTPATIENT)
Dept: RHEUMATOLOGY | Facility: CLINIC | Age: 58
End: 2023-02-01
Payer: COMMERCIAL

## 2023-02-01 VITALS
TEMPERATURE: 98 F | SYSTOLIC BLOOD PRESSURE: 136 MMHG | WEIGHT: 174 LBS | HEART RATE: 91 BPM | OXYGEN SATURATION: 98 % | BODY MASS INDEX: 29.35 KG/M2 | RESPIRATION RATE: 16 BRPM | DIASTOLIC BLOOD PRESSURE: 82 MMHG | HEIGHT: 64.5 IN

## 2023-02-01 DIAGNOSIS — M11.20 CHONDROCALCINOSIS: ICD-10-CM

## 2023-02-01 DIAGNOSIS — G89.29 CHRONIC SI JOINT PAIN: ICD-10-CM

## 2023-02-01 DIAGNOSIS — G89.29 CHRONIC PAIN OF LEFT KNEE: ICD-10-CM

## 2023-02-01 DIAGNOSIS — R79.82 ELEVATED C-REACTIVE PROTEIN (CRP): ICD-10-CM

## 2023-02-01 DIAGNOSIS — M25.562 CHRONIC PAIN OF LEFT KNEE: ICD-10-CM

## 2023-02-01 DIAGNOSIS — Z86.61 HISTORY OF MYELITIS: ICD-10-CM

## 2023-02-01 DIAGNOSIS — Z84.0 FAMILY HISTORY OF PSORIASIS IN MOTHER: ICD-10-CM

## 2023-02-01 DIAGNOSIS — M53.3 CHRONIC SI JOINT PAIN: ICD-10-CM

## 2023-02-01 DIAGNOSIS — M19.90 INFLAMMATORY ARTHRITIS: Primary | ICD-10-CM

## 2023-02-01 PROCEDURE — 3079F DIAST BP 80-89 MM HG: CPT | Performed by: INTERNAL MEDICINE

## 2023-02-01 PROCEDURE — 3008F BODY MASS INDEX DOCD: CPT | Performed by: INTERNAL MEDICINE

## 2023-02-01 PROCEDURE — 3075F SYST BP GE 130 - 139MM HG: CPT | Performed by: INTERNAL MEDICINE

## 2023-02-01 PROCEDURE — 99244 OFF/OP CNSLTJ NEW/EST MOD 40: CPT | Performed by: INTERNAL MEDICINE

## 2023-02-03 ENCOUNTER — TELEPHONE (OUTPATIENT)
Dept: RHEUMATOLOGY | Facility: CLINIC | Age: 58
End: 2023-02-03

## 2023-02-03 ENCOUNTER — HOSPITAL ENCOUNTER (OUTPATIENT)
Dept: GENERAL RADIOLOGY | Age: 58
Discharge: HOME | End: 2023-02-03
Attending: INTERNAL MEDICINE
Payer: COMMERCIAL

## 2023-02-03 ENCOUNTER — LAB ENCOUNTER (OUTPATIENT)
Dept: LAB | Age: 58
End: 2023-02-03
Attending: INTERNAL MEDICINE
Payer: COMMERCIAL

## 2023-02-03 DIAGNOSIS — G89.29 CHRONIC SI JOINT PAIN: ICD-10-CM

## 2023-02-03 DIAGNOSIS — Z84.0 FAMILY HISTORY OF PSORIASIS IN MOTHER: ICD-10-CM

## 2023-02-03 DIAGNOSIS — R79.82 ELEVATED C-REACTIVE PROTEIN (CRP): ICD-10-CM

## 2023-02-03 DIAGNOSIS — M53.3 CHRONIC SI JOINT PAIN: ICD-10-CM

## 2023-02-03 DIAGNOSIS — M46.1 SACROILIITIS (HCC): Primary | ICD-10-CM

## 2023-02-03 DIAGNOSIS — M19.90 INFLAMMATORY ARTHRITIS: ICD-10-CM

## 2023-02-03 LAB
ALBUMIN SERPL-MCNC: 3.8 G/DL (ref 3.4–5)
ALBUMIN/GLOB SERPL: 1.1 {RATIO} (ref 1–2)
ALP LIVER SERPL-CCNC: 68 U/L
ALT SERPL-CCNC: 31 U/L
ANION GAP SERPL CALC-SCNC: 5 MMOL/L (ref 0–18)
AST SERPL-CCNC: 18 U/L (ref 15–37)
BASOPHILS # BLD AUTO: 0.06 X10(3) UL (ref 0–0.2)
BASOPHILS NFR BLD AUTO: 0.7 %
BILIRUB SERPL-MCNC: 0.4 MG/DL (ref 0.1–2)
BUN BLD-MCNC: 13 MG/DL (ref 7–18)
CALCIUM BLD-MCNC: 9.8 MG/DL (ref 8.5–10.1)
CHLORIDE SERPL-SCNC: 108 MMOL/L (ref 98–112)
CO2 SERPL-SCNC: 25 MMOL/L (ref 21–32)
CREAT BLD-MCNC: 0.69 MG/DL
CRP SERPL-MCNC: 1.05 MG/DL (ref ?–0.3)
EOSINOPHIL # BLD AUTO: 0.23 X10(3) UL (ref 0–0.7)
EOSINOPHIL NFR BLD AUTO: 2.7 %
ERYTHROCYTE [DISTWIDTH] IN BLOOD BY AUTOMATED COUNT: 12.3 %
ERYTHROCYTE [SEDIMENTATION RATE] IN BLOOD: 17 MM/HR
FASTING STATUS PATIENT QL REPORTED: NO
GFR SERPLBLD BASED ON 1.73 SQ M-ARVRAT: 102 ML/MIN/1.73M2 (ref 60–?)
GLOBULIN PLAS-MCNC: 3.5 G/DL (ref 2.8–4.4)
GLUCOSE BLD-MCNC: 128 MG/DL (ref 70–99)
HCT VFR BLD AUTO: 42.8 %
HGB BLD-MCNC: 14.3 G/DL
IGA SERPL-MCNC: 146 MG/DL (ref 70–312)
IGM SERPL-MCNC: 80.6 MG/DL (ref 43–279)
IMM GRANULOCYTES # BLD AUTO: 0.03 X10(3) UL (ref 0–1)
IMM GRANULOCYTES NFR BLD: 0.4 %
IMMUNOGLOBULIN PNL SER-MCNC: 1130 MG/DL (ref 791–1643)
LYMPHOCYTES # BLD AUTO: 1.78 X10(3) UL (ref 1–4)
LYMPHOCYTES NFR BLD AUTO: 20.8 %
MCH RBC QN AUTO: 28.9 PG (ref 26–34)
MCHC RBC AUTO-ENTMCNC: 33.4 G/DL (ref 31–37)
MCV RBC AUTO: 86.5 FL
MONOCYTES # BLD AUTO: 0.78 X10(3) UL (ref 0.1–1)
MONOCYTES NFR BLD AUTO: 9.1 %
NEUTROPHILS # BLD AUTO: 5.66 X10 (3) UL (ref 1.5–7.7)
NEUTROPHILS # BLD AUTO: 5.66 X10(3) UL (ref 1.5–7.7)
NEUTROPHILS NFR BLD AUTO: 66.3 %
OSMOLALITY SERPL CALC.SUM OF ELEC: 288 MOSM/KG (ref 275–295)
PLATELET # BLD AUTO: 326 10(3)UL (ref 150–450)
POTASSIUM SERPL-SCNC: 3.8 MMOL/L (ref 3.5–5.1)
PROT SERPL-MCNC: 7.3 G/DL (ref 6.4–8.2)
RBC # BLD AUTO: 4.95 X10(6)UL
RHEUMATOID FACT SERPL-ACNC: <10 IU/ML (ref ?–15)
SODIUM SERPL-SCNC: 138 MMOL/L (ref 136–145)
WBC # BLD AUTO: 8.5 X10(3) UL (ref 4–11)

## 2023-02-03 PROCEDURE — 86038 ANTINUCLEAR ANTIBODIES: CPT

## 2023-02-03 PROCEDURE — 36415 COLL VENOUS BLD VENIPUNCTURE: CPT

## 2023-02-03 PROCEDURE — 86200 CCP ANTIBODY: CPT

## 2023-02-03 PROCEDURE — 80053 COMPREHEN METABOLIC PANEL: CPT

## 2023-02-03 PROCEDURE — 85652 RBC SED RATE AUTOMATED: CPT

## 2023-02-03 PROCEDURE — 72110 X-RAY EXAM L-2 SPINE 4/>VWS: CPT | Performed by: INTERNAL MEDICINE

## 2023-02-03 PROCEDURE — 86431 RHEUMATOID FACTOR QUANT: CPT

## 2023-02-03 PROCEDURE — 86140 C-REACTIVE PROTEIN: CPT

## 2023-02-03 PROCEDURE — 81374 HLA I TYPING 1 ANTIGEN LR: CPT

## 2023-02-03 PROCEDURE — 72202 X-RAY EXAM SI JOINTS 3/> VWS: CPT | Performed by: INTERNAL MEDICINE

## 2023-02-03 PROCEDURE — 85025 COMPLETE CBC W/AUTO DIFF WBC: CPT

## 2023-02-03 PROCEDURE — 82784 ASSAY IGA/IGD/IGG/IGM EACH: CPT

## 2023-02-03 NOTE — TELEPHONE ENCOUNTER
Called patient. 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 Advised patient that she can still try taking the colchicine today tomorrow and Sunday as she is unlikely to be able to get an MRI done over the weekend. Would like her to avoid any NSAIDs or other anti-inflammatories at least 24 to 48 hours prior to MRI. Discussed with patient that labs are still pending but they are all in process. We will reach out if anything is alarming in the meantime, otherwise we will discuss further at her upcoming visit on 02/21      Patient verbalized understanding of above instructions. No further questions at this time. Appreciative of my call.     Gonzalez Young DO  [facility]  2/3/2023

## 2023-02-06 LAB
CCP IGG SERPL-ACNC: 1.1 U/ML (ref 0–6.9)
HLA-B27: NEGATIVE
NUCLEAR IGG TITR SER IF: NEGATIVE {TITER}

## 2023-02-21 ENCOUNTER — OFFICE VISIT (OUTPATIENT)
Dept: RHEUMATOLOGY | Facility: CLINIC | Age: 58
End: 2023-02-21
Payer: COMMERCIAL

## 2023-02-21 VITALS
TEMPERATURE: 98 F | WEIGHT: 172 LBS | RESPIRATION RATE: 16 BRPM | SYSTOLIC BLOOD PRESSURE: 142 MMHG | DIASTOLIC BLOOD PRESSURE: 60 MMHG | BODY MASS INDEX: 29.37 KG/M2 | HEIGHT: 64 IN | OXYGEN SATURATION: 98 % | HEART RATE: 94 BPM

## 2023-02-21 DIAGNOSIS — R79.82 ELEVATED C-REACTIVE PROTEIN (CRP): ICD-10-CM

## 2023-02-21 DIAGNOSIS — M19.90 INFLAMMATORY ARTHRITIS: Primary | ICD-10-CM

## 2023-02-21 DIAGNOSIS — M25.562 CHRONIC PAIN OF LEFT KNEE: ICD-10-CM

## 2023-02-21 DIAGNOSIS — M11.20 CHONDROCALCINOSIS: ICD-10-CM

## 2023-02-21 DIAGNOSIS — G89.29 CHRONIC PAIN OF LEFT KNEE: ICD-10-CM

## 2023-02-21 DIAGNOSIS — I10 PRIMARY HYPERTENSION: ICD-10-CM

## 2023-02-21 PROCEDURE — 3078F DIAST BP <80 MM HG: CPT | Performed by: INTERNAL MEDICINE

## 2023-02-21 PROCEDURE — 3008F BODY MASS INDEX DOCD: CPT | Performed by: INTERNAL MEDICINE

## 2023-02-21 PROCEDURE — 99214 OFFICE O/P EST MOD 30 MIN: CPT | Performed by: INTERNAL MEDICINE

## 2023-02-21 PROCEDURE — 3077F SYST BP >= 140 MM HG: CPT | Performed by: INTERNAL MEDICINE

## 2023-02-21 RX ORDER — PREDNISONE 10 MG/1
TABLET ORAL
Qty: 30 TABLET | Refills: 0 | Status: SHIPPED | OUTPATIENT
Start: 2023-02-21

## 2023-02-21 NOTE — TELEPHONE ENCOUNTER
Hypertension Medications Protocol Passed 02/21/2023 11:55 AM   Protocol Details  CMP or BMP in past 12 months    Last serum creatinine< 2.0    Appointment in past 6 or next 3 months     Last OV:12/29/2022  Last refill:01/23/2023, 30 tabs, 0 refill  Newer medication start     Left message to call back to see how patient is doing on new medication.      Medication pended, please sign if appropriate

## 2023-02-21 NOTE — TELEPHONE ENCOUNTER
Patient's name and  verified     Patient stated there is not much change in her blood pressure she is going send some blood pressure readings through my chart. She said in the evening it has been 146/80. In the am it is much lower.      Patient notified and verbalized an understanding

## 2023-02-22 DIAGNOSIS — I10 PRIMARY HYPERTENSION: ICD-10-CM

## 2023-02-22 RX ORDER — METOPROLOL SUCCINATE 25 MG/1
TABLET, EXTENDED RELEASE ORAL
Qty: 90 TABLET | Refills: 0 | Status: SHIPPED | OUTPATIENT
Start: 2023-02-22 | End: 2023-05-23

## 2023-02-22 RX ORDER — METOPROLOL SUCCINATE 25 MG/1
25 TABLET, EXTENDED RELEASE ORAL DAILY
Qty: 30 TABLET | Refills: 0 | Status: CANCELLED | OUTPATIENT
Start: 2023-02-22

## 2023-02-27 ENCOUNTER — PATIENT MESSAGE (OUTPATIENT)
Dept: RHEUMATOLOGY | Facility: CLINIC | Age: 58
End: 2023-02-27

## 2023-02-27 DIAGNOSIS — M11.20 CHONDROCALCINOSIS: ICD-10-CM

## 2023-02-27 DIAGNOSIS — M25.562 CHRONIC PAIN OF LEFT KNEE: ICD-10-CM

## 2023-02-27 DIAGNOSIS — G89.29 CHRONIC PAIN OF LEFT KNEE: ICD-10-CM

## 2023-02-27 DIAGNOSIS — M19.90 INFLAMMATORY ARTHRITIS: Primary | ICD-10-CM

## 2023-03-07 RX ORDER — PREDNISONE 10 MG/1
TABLET ORAL
Qty: 30 TABLET | Refills: 0 | Status: SHIPPED | OUTPATIENT
Start: 2023-03-07

## 2023-03-13 ENCOUNTER — OFFICE VISIT (OUTPATIENT)
Dept: FAMILY MEDICINE CLINIC | Facility: CLINIC | Age: 58
End: 2023-03-13
Payer: COMMERCIAL

## 2023-03-13 VITALS
RESPIRATION RATE: 18 BRPM | TEMPERATURE: 99 F | WEIGHT: 172 LBS | BODY MASS INDEX: 30 KG/M2 | SYSTOLIC BLOOD PRESSURE: 156 MMHG | OXYGEN SATURATION: 98 % | HEART RATE: 114 BPM | DIASTOLIC BLOOD PRESSURE: 88 MMHG

## 2023-03-13 DIAGNOSIS — R05.1 ACUTE COUGH: ICD-10-CM

## 2023-03-13 DIAGNOSIS — R06.2 WHEEZING: Primary | ICD-10-CM

## 2023-03-13 PROCEDURE — 3079F DIAST BP 80-89 MM HG: CPT | Performed by: NURSE PRACTITIONER

## 2023-03-13 PROCEDURE — 3077F SYST BP >= 140 MM HG: CPT | Performed by: NURSE PRACTITIONER

## 2023-03-13 NOTE — PATIENT INSTRUCTIONS
Go to the Emergency Department for further evaluation and treatment
Yes - the patient is able to be screened

## 2023-03-13 NOTE — PROGRESS NOTES
Cathalene Saint is a 66year old female who presents to [facility] with c/o worsening wheezing and cough. Pt notes symptoms present x 10 days. Notes she is using her albuterol 5-6 times a day and she is currently on day 9 of a 12 day course of prednisone. She was also on prednisone at the end of February for a left knee issue. Notes she felt warm today but notes she did not take her temp. Denies any sore throat. Tolerates PO well at home    Lungs: course bilat. Nonproductive cough noted. + expiratory wheezing. No rales notes. O2 btak79-48% on Ra    Accompanied by: self  After triage, higher acuity of care was recommended to Cathalene Saint today. [facility] recommendation: [facility]. I called the [facility]. Provider recommended the pt bypass the IC and head to ED for further eval and possible hour-long neb. Discussed HPI and physical exam with pt. We discussed the limited diagnostic capacity of this clinic and there are dangerous conditions associated with her worsening wheezing that I can not fully rule out. I advised she be seen in the ED. Pt verbalized understanding and notes she will go to the [facility] ED. She declined medical transport. We discussed the risks of not going by medical transport including worsening condition, permanent injury and/or death. She verbalized understanding. Patient declined transport via EMS or having someone else driv her. Patient/parent verbalized understanding of rationale for further evaluation and was stable upon discharge.

## 2023-03-14 ENCOUNTER — TELEPHONE (OUTPATIENT)
Dept: FAMILY MEDICINE CLINIC | Facility: CLINIC | Age: 58
End: 2023-03-14

## 2023-03-14 ENCOUNTER — OFFICE VISIT (OUTPATIENT)
Dept: FAMILY MEDICINE CLINIC | Facility: CLINIC | Age: 58
End: 2023-03-14
Payer: COMMERCIAL

## 2023-03-14 ENCOUNTER — HOSPITAL ENCOUNTER (OUTPATIENT)
Dept: GENERAL RADIOLOGY | Age: 58
Discharge: HOME | End: 2023-03-14
Attending: NURSE PRACTITIONER
Payer: COMMERCIAL

## 2023-03-14 VITALS
TEMPERATURE: 97 F | OXYGEN SATURATION: 97 % | SYSTOLIC BLOOD PRESSURE: 128 MMHG | HEART RATE: 101 BPM | WEIGHT: 170.63 LBS | BODY MASS INDEX: 29 KG/M2 | DIASTOLIC BLOOD PRESSURE: 74 MMHG

## 2023-03-14 DIAGNOSIS — R06.2 EXPIRATORY WHEEZING ON RIGHT SIDE OF CHEST: ICD-10-CM

## 2023-03-14 DIAGNOSIS — R06.2 EXPIRATORY WHEEZING ON RIGHT SIDE OF CHEST: Primary | ICD-10-CM

## 2023-03-14 DIAGNOSIS — J45.40 MODERATE PERSISTENT REACTIVE AIRWAY DISEASE WITH WHEEZING WITHOUT COMPLICATION: Primary | ICD-10-CM

## 2023-03-14 DIAGNOSIS — R09.89 RESPIRATORY CRACKLES AT LEFT LUNG BASE: ICD-10-CM

## 2023-03-14 PROCEDURE — 3078F DIAST BP <80 MM HG: CPT | Performed by: NURSE PRACTITIONER

## 2023-03-14 PROCEDURE — 71046 X-RAY EXAM CHEST 2 VIEWS: CPT | Performed by: NURSE PRACTITIONER

## 2023-03-14 PROCEDURE — 99214 OFFICE O/P EST MOD 30 MIN: CPT | Performed by: NURSE PRACTITIONER

## 2023-03-14 PROCEDURE — 94640 AIRWAY INHALATION TREATMENT: CPT | Performed by: NURSE PRACTITIONER

## 2023-03-14 PROCEDURE — 3074F SYST BP LT 130 MM HG: CPT | Performed by: NURSE PRACTITIONER

## 2023-03-14 RX ORDER — ALBUTEROL SULFATE 2.5 MG/3ML
2.5 SOLUTION RESPIRATORY (INHALATION) EVERY 6 HOURS PRN
Qty: 30 ML | Refills: 0 | Status: SHIPPED | OUTPATIENT
Start: 2023-03-14 | End: 2023-03-21

## 2023-03-14 RX ORDER — ALBUTEROL SULFATE 2.5 MG/3ML
2.5 SOLUTION RESPIRATORY (INHALATION) ONCE
Status: COMPLETED | OUTPATIENT
Start: 2023-03-14 | End: 2023-03-14

## 2023-03-14 RX ORDER — NEBULIZER ACCESSORIES
KIT MISCELLANEOUS
Qty: 1 KIT | Refills: 0 | Status: SHIPPED | OUTPATIENT
Start: 2023-03-14

## 2023-03-14 RX ORDER — FLUTICASONE PROPIONATE 44 UG/1
2 AEROSOL, METERED RESPIRATORY (INHALATION) 2 TIMES DAILY
Qty: 10.6 G | Refills: 1 | Status: SHIPPED | OUTPATIENT
Start: 2023-03-14 | End: 2023-04-13

## 2023-03-14 RX ORDER — ALBUTEROL SULFATE 90 UG/1
1-2 AEROSOL, METERED RESPIRATORY (INHALATION) EVERY 4 HOURS PRN
Qty: 1 EACH | Refills: 0 | Status: SHIPPED | OUTPATIENT
Start: 2023-03-14

## 2023-03-14 RX ADMIN — ALBUTEROL SULFATE 2.5 MG: 2.5 SOLUTION RESPIRATORY (INHALATION) at 12:14:00

## 2023-03-14 NOTE — TELEPHONE ENCOUNTER
Patient states when she was [facility] yesterday she was at her worst.  She hadn't done her inhaler in 4 hours. States her O2 sats wre 97-98%  Went home did inhaler and got a decent night sleep  States today symptoms are better.     She would like to schedule evaluation with NP to make sure she is not getting pneumonia    Future Appointments   Date Time Provider Doroteo Spann   3/14/2023 11:00 AM ROB Sal [facility] BEVERLY Mireles

## 2023-03-14 NOTE — TELEPHONE ENCOUNTER
WARD/ROB called to notify patient of results at [phone]. Patient's name and date of birth were confirmed. Discussed results and plan of care. Reports daily symptoms of cough and wheezing, using albuterol 3-4 times a day and  reports night time awakenings over the past 10 days    We will trial an asthma controller medication 2 puffs bid and use albuterol PRN as prescribed. Patient verbalizes understanding and agrees with plan of care. Denies further questions at this time.

## 2023-03-14 NOTE — TELEPHONE ENCOUNTER
PT WAS SEEN IN [facility] YESTERDAY - WAS ADV TO GO TO ER FOR CHEST X-RAY AND NEB TREATMENT. PT WENT TO ER AND THERE WAS A 5 HOUR WAIT. PT WOULD LIKE TO KNOW IF WE CAN PUT IN CHEST X-RAY OR DOES PT NEED TO BE SEEN AGAIN.     PLEASE ADV    THANK YOU

## 2023-03-14 NOTE — TELEPHONE ENCOUNTER
Left message on voicemail/answering machine for patient to call office  Need to get update on symptoms.

## 2023-03-22 ENCOUNTER — OFFICE VISIT (OUTPATIENT)
Dept: FAMILY MEDICINE CLINIC | Facility: CLINIC | Age: 58
End: 2023-03-22
Payer: COMMERCIAL

## 2023-03-22 VITALS
OXYGEN SATURATION: 98 % | HEART RATE: 83 BPM | TEMPERATURE: 97 F | DIASTOLIC BLOOD PRESSURE: 70 MMHG | WEIGHT: 174.81 LBS | BODY MASS INDEX: 30 KG/M2 | SYSTOLIC BLOOD PRESSURE: 122 MMHG

## 2023-03-22 DIAGNOSIS — Z79.899 FOLLOW-UP ENCOUNTER INVOLVING MEDICATION: ICD-10-CM

## 2023-03-22 DIAGNOSIS — I10 ESSENTIAL HYPERTENSION: Primary | ICD-10-CM

## 2023-03-22 PROCEDURE — 99214 OFFICE O/P EST MOD 30 MIN: CPT | Performed by: FAMILY MEDICINE

## 2023-03-22 PROCEDURE — 3074F SYST BP LT 130 MM HG: CPT | Performed by: FAMILY MEDICINE

## 2023-03-22 PROCEDURE — 3078F DIAST BP <80 MM HG: CPT | Performed by: FAMILY MEDICINE

## 2023-03-22 RX ORDER — HYDROCHLOROTHIAZIDE 25 MG/1
25 TABLET ORAL DAILY
Qty: 90 TABLET | Refills: 0 | Status: SHIPPED | OUTPATIENT
Start: 2023-03-22 | End: 2023-06-20

## 2023-03-30 PROBLEM — I10 ESSENTIAL HYPERTENSION: Status: ACTIVE | Noted: 2023-03-30

## 2023-04-13 ENCOUNTER — LAB ENCOUNTER (OUTPATIENT)
Dept: LAB | Age: 58
End: 2023-04-13
Attending: INTERNAL MEDICINE
Payer: COMMERCIAL

## 2023-04-13 DIAGNOSIS — M19.90 INFLAMMATORY ARTHRITIS: ICD-10-CM

## 2023-04-13 DIAGNOSIS — M25.562 CHRONIC PAIN OF LEFT KNEE: ICD-10-CM

## 2023-04-13 DIAGNOSIS — G89.29 CHRONIC PAIN OF LEFT KNEE: ICD-10-CM

## 2023-04-13 DIAGNOSIS — R79.82 ELEVATED C-REACTIVE PROTEIN (CRP): ICD-10-CM

## 2023-04-13 DIAGNOSIS — M11.20 CHONDROCALCINOSIS: ICD-10-CM

## 2023-04-13 LAB
CRP SERPL-MCNC: 1.03 MG/DL (ref ?–0.3)
ERYTHROCYTE [SEDIMENTATION RATE] IN BLOOD: 21 MM/HR

## 2023-04-13 PROCEDURE — 85652 RBC SED RATE AUTOMATED: CPT | Performed by: INTERNAL MEDICINE

## 2023-04-13 PROCEDURE — 86140 C-REACTIVE PROTEIN: CPT | Performed by: INTERNAL MEDICINE

## 2023-04-13 PROCEDURE — 83516 IMMUNOASSAY NONANTIBODY: CPT | Performed by: INTERNAL MEDICINE

## 2023-04-13 PROCEDURE — 86036 ANCA SCREEN EACH ANTIBODY: CPT | Performed by: INTERNAL MEDICINE

## 2023-04-20 ENCOUNTER — TELEPHONE (OUTPATIENT)
Dept: FAMILY MEDICINE CLINIC | Facility: CLINIC | Age: 58
End: 2023-04-20

## 2023-04-20 NOTE — TELEPHONE ENCOUNTER

## 2023-04-20 NOTE — TELEPHONE ENCOUNTER
Future Appointments   Date Time Provider Doroteo Spann   5/1/2023  3:20 PM ROB Bateman [facility]   8/22/2023  1:00 PM Yajaira Chapin DO [facility] [employer] as an [employer]

## 2023-04-24 LAB
ANCA BY IFA (RDL): NEGATIVE
ANTI-MPO AB (RDL): <20 UNITS
ANTI-PR-3 AB (RDL): <20 UNITS

## 2023-05-01 ENCOUNTER — OFFICE VISIT (OUTPATIENT)
Dept: FAMILY MEDICINE CLINIC | Facility: CLINIC | Age: 58
End: 2023-05-01
Payer: COMMERCIAL

## 2023-05-01 ENCOUNTER — TELEPHONE (OUTPATIENT)
Dept: FAMILY MEDICINE CLINIC | Facility: CLINIC | Age: 58
End: 2023-05-01

## 2023-05-01 VITALS
OXYGEN SATURATION: 99 % | DIASTOLIC BLOOD PRESSURE: 82 MMHG | SYSTOLIC BLOOD PRESSURE: 146 MMHG | TEMPERATURE: 98 F | BODY MASS INDEX: 29 KG/M2 | WEIGHT: 171.81 LBS | HEART RATE: 94 BPM

## 2023-05-01 DIAGNOSIS — R73.03 PREDIABETES: ICD-10-CM

## 2023-05-01 DIAGNOSIS — I10 ESSENTIAL HYPERTENSION: ICD-10-CM

## 2023-05-01 DIAGNOSIS — I49.3 PVC'S (PREMATURE VENTRICULAR CONTRACTIONS): ICD-10-CM

## 2023-05-01 DIAGNOSIS — I72.8 ANEURYSM, SPLENIC ARTERY (HCC): ICD-10-CM

## 2023-05-01 DIAGNOSIS — S83.207S ACUTE MENISCAL TEAR OF LEFT KNEE, SEQUELA: ICD-10-CM

## 2023-05-01 DIAGNOSIS — J45.20 MILD INTERMITTENT REACTIVE AIRWAY DISEASE WITH WHEEZING WITHOUT COMPLICATION: ICD-10-CM

## 2023-05-01 DIAGNOSIS — G37.3 TRANSVERSE MYELITIS (HCC): ICD-10-CM

## 2023-05-01 DIAGNOSIS — Z01.818 PREOP EXAMINATION: Primary | ICD-10-CM

## 2023-05-01 PROBLEM — J45.909 REACTIVE AIRWAY DISEASE WITH WHEEZING (HCC): Status: ACTIVE | Noted: 2023-05-01

## 2023-05-01 PROBLEM — S83.242A ACUTE MEDIAL MENISCUS TEAR OF LEFT KNEE: Status: ACTIVE | Noted: 2023-04-21

## 2023-05-01 PROBLEM — J45.909 REACTIVE AIRWAY DISEASE WITH WHEEZING: Status: RESOLVED | Noted: 2023-05-01 | Resolved: 2023-05-01

## 2023-05-01 PROBLEM — J45.909 REACTIVE AIRWAY DISEASE WITH WHEEZING (HCC): Status: RESOLVED | Noted: 2023-05-01 | Resolved: 2023-05-01

## 2023-05-01 PROBLEM — J45.909 REACTIVE AIRWAY DISEASE WITH WHEEZING: Status: ACTIVE | Noted: 2023-05-01

## 2023-05-01 LAB
ALBUMIN SERPL-MCNC: 3.9 G/DL (ref 3.4–5)
ALBUMIN/GLOB SERPL: 1 {RATIO} (ref 1–2)
ALP LIVER SERPL-CCNC: 74 U/L
ALT SERPL-CCNC: 55 U/L
ANION GAP SERPL CALC-SCNC: 2 MMOL/L (ref 0–18)
AST SERPL-CCNC: 36 U/L (ref 15–37)
ATRIAL RATE: 86 BPM
BASOPHILS # BLD AUTO: 0.08 X10(3) UL (ref 0–0.2)
BASOPHILS NFR BLD AUTO: 0.7 %
BILIRUB SERPL-MCNC: 0.3 MG/DL (ref 0.1–2)
BUN BLD-MCNC: 14 MG/DL (ref 7–18)
CALCIUM BLD-MCNC: 10 MG/DL (ref 8.5–10.1)
CHLORIDE SERPL-SCNC: 104 MMOL/L (ref 98–112)
CO2 SERPL-SCNC: 31 MMOL/L (ref 21–32)
CREAT BLD-MCNC: 0.87 MG/DL
EOSINOPHIL # BLD AUTO: 0.27 X10(3) UL (ref 0–0.7)
EOSINOPHIL NFR BLD AUTO: 2.5 %
ERYTHROCYTE [DISTWIDTH] IN BLOOD BY AUTOMATED COUNT: 12.3 %
FASTING STATUS PATIENT QL REPORTED: NO
GFR SERPLBLD BASED ON 1.73 SQ M-ARVRAT: 78 ML/MIN/1.73M2 (ref 60–?)
GLOBULIN PLAS-MCNC: 3.9 G/DL (ref 2.8–4.4)
GLUCOSE BLD-MCNC: 148 MG/DL (ref 70–99)
HCT VFR BLD AUTO: 44.7 %
HGB BLD-MCNC: 14.7 G/DL
IMM GRANULOCYTES # BLD AUTO: 0.04 X10(3) UL (ref 0–1)
IMM GRANULOCYTES NFR BLD: 0.4 %
LYMPHOCYTES # BLD AUTO: 2.69 X10(3) UL (ref 1–4)
LYMPHOCYTES NFR BLD AUTO: 24.8 %
MCH RBC QN AUTO: 28.8 PG (ref 26–34)
MCHC RBC AUTO-ENTMCNC: 32.9 G/DL (ref 31–37)
MCV RBC AUTO: 87.5 FL
MONOCYTES # BLD AUTO: 0.93 X10(3) UL (ref 0.1–1)
MONOCYTES NFR BLD AUTO: 8.6 %
NEUTROPHILS # BLD AUTO: 6.83 X10 (3) UL (ref 1.5–7.7)
NEUTROPHILS # BLD AUTO: 6.83 X10(3) UL (ref 1.5–7.7)
NEUTROPHILS NFR BLD AUTO: 63 %
OSMOLALITY SERPL CALC.SUM OF ELEC: 287 MOSM/KG (ref 275–295)
P AXIS: 46 DEGREES
P-R INTERVAL: 140 MS
PLATELET # BLD AUTO: 339 10(3)UL (ref 150–450)
POTASSIUM SERPL-SCNC: 3.1 MMOL/L (ref 3.5–5.1)
PROT SERPL-MCNC: 7.8 G/DL (ref 6.4–8.2)
Q-T INTERVAL: 346 MS
QRS DURATION: 80 MS
QTC CALCULATION (BEZET): 414 MS
R AXIS: 30 DEGREES
RBC # BLD AUTO: 5.11 X10(6)UL
SODIUM SERPL-SCNC: 137 MMOL/L (ref 136–145)
T AXIS: 20 DEGREES
VENTRICULAR RATE: 86 BPM
WBC # BLD AUTO: 10.8 X10(3) UL (ref 4–11)

## 2023-05-01 PROCEDURE — 83036 HEMOGLOBIN GLYCOSYLATED A1C: CPT | Performed by: NURSE PRACTITIONER

## 2023-05-01 PROCEDURE — 80053 COMPREHEN METABOLIC PANEL: CPT | Performed by: NURSE PRACTITIONER

## 2023-05-01 PROCEDURE — 85025 COMPLETE CBC W/AUTO DIFF WBC: CPT | Performed by: NURSE PRACTITIONER

## 2023-05-01 RX ORDER — ESTROGEN,CON/M-PROGEST ACET 0.3-1.5MG
1 TABLET ORAL DAILY
Qty: 90 TABLET | Refills: 0 | Status: SHIPPED | OUTPATIENT
Start: 2023-05-01 | End: 2023-07-30

## 2023-05-01 NOTE — TELEPHONE ENCOUNTER
Patient would like your opinion on her hormone replacement therapy. She has read about the dangers of HRT. Her neurologist recommends that she not be on it. Ob/Gyne does not seem concerned. She would like to stop but unsure how to go about doing that. Wants your thoughts.

## 2023-05-02 ENCOUNTER — TELEPHONE (OUTPATIENT)
Dept: FAMILY MEDICINE CLINIC | Facility: CLINIC | Age: 58
End: 2023-05-02

## 2023-05-02 LAB
EST. AVERAGE GLUCOSE BLD GHB EST-MCNC: 126 MG/DL (ref 68–126)
HBA1C MFR BLD: 6 % (ref ?–5.7)

## 2023-05-02 NOTE — TELEPHONE ENCOUNTER
----- Message from ROB Vidal sent at 5/2/2023  8:28 AM CDT -----  A1C in prediabetic range.  Could consider trying Metformin or working on diet and recheck in 3 mo

## 2023-05-02 NOTE — TELEPHONE ENCOUNTER
Gina Garber from [address] [place] [address] pre-admission called requesting pre-operative clearance    Fax [phone]    Please adv  Thank you

## 2023-05-02 NOTE — TELEPHONE ENCOUNTER
Advised patient of ROB's note below. Patient verbalized understanding and is aware of results and what she needs to do.   Pt declining to start new Rx at this time - as she \"has a lot going on right now\" and does not want to start a new med    Pt plans to start exercising more after surgery, has started eating better today    Pt wanted to notify Aramis Meals - she has reached out to Dr. Noe Grewal (vascular) office - Dr. Elisabeth Falcon currently on vacation - but MA she spoke to read her last office visit and stated no contraindications to surgery noted  Pt was advised to have surgeon's office send over pre-surgical clearance request    Pt reports she called her surgeon's (Dr. Adrian Smith) office this morning - advised him of note above - she reports that Dr. Adrian Smith says it is up to PCP if vascular clearance needed for pre-op clearance    Advised pt that Aramis Meals not in office, will be back tomorrow - she v/u    Please advise, thank you    (pt stated Aramis Meals can give her a call if she wants)

## 2023-05-02 NOTE — TELEPHONE ENCOUNTER
Considering she has been on this from 2021 - recommend going down to the 0.3-1.5 dose for at least a few months, maybe through the summer ?! and then you could go off completely or do half tab for a few more months prior to discontinuing. Lower Rx dose sent to her pharmacy for 90 days.

## 2023-05-02 NOTE — TELEPHONE ENCOUNTER
Pt to return for NV for BP recheck prior to pre-op clearance.     Future Appointments   Date Time Provider Doroteo Spann   5/4/2023  9:00 AM  [facility] [address] [facility] Lul Aaron

## 2023-05-03 NOTE — TELEPHONE ENCOUNTER
Noted.   Dr. Gila Ambrosio stated she did not need to see vascular surgeon for clearance. She did get a call from Dr. Wong Beams office. Has an appt with his NP tomorrow. Will keep appt.

## 2023-05-03 NOTE — TELEPHONE ENCOUNTER
Advised patient of APRN's note below. Patient verbalized understanding. No further questions at this time.     Future Appointments   Date Time Provider Doroteo Spann   5/4/2023  9:00 AM [facility] Tay Batista [facility]   8/22/2023  1:00 PM [facility] [employer]

## 2023-05-04 ENCOUNTER — NURSE ONLY (OUTPATIENT)
Dept: FAMILY MEDICINE CLINIC | Facility: CLINIC | Age: 58
End: 2023-05-04
Payer: COMMERCIAL

## 2023-05-04 VITALS — DIASTOLIC BLOOD PRESSURE: 68 MMHG | SYSTOLIC BLOOD PRESSURE: 122 MMHG

## 2023-05-04 PROCEDURE — 3078F DIAST BP <80 MM HG: CPT | Performed by: NURSE PRACTITIONER

## 2023-05-04 PROCEDURE — 3074F SYST BP LT 130 MM HG: CPT | Performed by: NURSE PRACTITIONER

## 2023-05-04 NOTE — PROGRESS NOTES
Patient to clinic for BP check. Patient seated with feet flat and back supported  Patient reports taking hydrochlorothiazide this morning metoprolol yesterday afternoon. /68    Advised continue current medications. Patient left office in stable condition.

## 2023-05-04 NOTE — TELEPHONE ENCOUNTER
Note and ekg faxed  Antoinette at [address] [facility] notified    Patient notified and verbalized understanding.

## 2023-05-14 ENCOUNTER — PATIENT MESSAGE (OUTPATIENT)
Dept: FAMILY MEDICINE CLINIC | Facility: CLINIC | Age: 58
End: 2023-05-14

## 2023-05-14 DIAGNOSIS — I10 PRIMARY HYPERTENSION: ICD-10-CM

## 2023-05-15 RX ORDER — METOPROLOL SUCCINATE 25 MG/1
25 TABLET, EXTENDED RELEASE ORAL DAILY
Qty: 90 TABLET | Refills: 0 | OUTPATIENT
Start: 2023-05-15 | End: 2023-08-13

## 2023-05-15 RX ORDER — METOPROLOL SUCCINATE 50 MG/1
50 TABLET, EXTENDED RELEASE ORAL DAILY
Qty: 90 TABLET | Refills: 0 | Status: SHIPPED | OUTPATIENT
Start: 2023-05-15

## 2023-05-15 NOTE — TELEPHONE ENCOUNTER
From: Jose Briones  To: Hilad ROB Williamson  Sent: 5/14/2023 11:08 AM CDT  Subject: Metoprolol 50 mg. Hello,     When I saw you in the office for my pre op visit, you increased the Metoprolol to 50mg. Mallory Beau been taking #2 of my 25mg and are now out. Could you please send a new script of 50 mg to [address] in [place] [address]?      Thank you   Marie Thompson

## 2023-07-13 ENCOUNTER — MED REC SCAN ONLY (OUTPATIENT)
Dept: FAMILY MEDICINE CLINIC | Facility: CLINIC | Age: 58
End: 2023-07-13

## 2023-08-17 RX ORDER — METOPROLOL SUCCINATE 50 MG/1
50 TABLET, EXTENDED RELEASE ORAL DAILY
Qty: 90 TABLET | Refills: 0 | Status: SHIPPED | OUTPATIENT
Start: 2023-08-17

## 2023-08-17 NOTE — TELEPHONE ENCOUNTER
Hypertension Medications Protocol [id number]/17/2023 09:36 AM   Protocol Details CMP or BMP in past 12 months    Last serum creatinine< 2.0    Appointment in past 6 or next 3 months     LOV 5/1/23    Last Refill 5/15/23    Future Appointments   Date Time Provider Doroteo Spann   8/22/2023  1:00 PM Hilary Chapin DO [facility]     Medication past protocol    Medication sent

## 2023-08-22 ENCOUNTER — LAB ENCOUNTER (OUTPATIENT)
Dept: LAB | Age: 58
End: 2023-08-22
Attending: INTERNAL MEDICINE
Payer: COMMERCIAL

## 2023-08-22 ENCOUNTER — OFFICE VISIT (OUTPATIENT)
Dept: RHEUMATOLOGY | Facility: CLINIC | Age: 58
End: 2023-08-22
Payer: COMMERCIAL

## 2023-08-22 VITALS
DIASTOLIC BLOOD PRESSURE: 60 MMHG | TEMPERATURE: 97 F | HEART RATE: 86 BPM | OXYGEN SATURATION: 96 % | BODY MASS INDEX: 29.37 KG/M2 | HEIGHT: 64 IN | WEIGHT: 172 LBS | RESPIRATION RATE: 16 BRPM | SYSTOLIC BLOOD PRESSURE: 114 MMHG

## 2023-08-22 DIAGNOSIS — R53.82 CHRONIC FATIGUE: ICD-10-CM

## 2023-08-22 DIAGNOSIS — M19.90 INFLAMMATORY ARTHRITIS: Primary | ICD-10-CM

## 2023-08-22 DIAGNOSIS — M19.90 INFLAMMATORY ARTHRITIS: ICD-10-CM

## 2023-08-22 DIAGNOSIS — Z86.61 HISTORY OF MYELITIS: ICD-10-CM

## 2023-08-22 DIAGNOSIS — R79.82 ELEVATED C-REACTIVE PROTEIN (CRP): ICD-10-CM

## 2023-08-22 DIAGNOSIS — E55.9 VITAMIN D DEFICIENCY: ICD-10-CM

## 2023-08-22 DIAGNOSIS — Z84.0 FAMILY HISTORY OF PSORIASIS IN MOTHER: ICD-10-CM

## 2023-08-22 LAB
ALBUMIN SERPL-MCNC: 3.8 G/DL (ref 3.4–5)
ALBUMIN/GLOB SERPL: 1.1 {RATIO} (ref 1–2)
ALP LIVER SERPL-CCNC: 113 U/L
ALT SERPL-CCNC: 56 U/L
ANION GAP SERPL CALC-SCNC: 4 MMOL/L (ref 0–18)
AST SERPL-CCNC: 34 U/L (ref 15–37)
BASOPHILS # BLD AUTO: 0.09 X10(3) UL (ref 0–0.2)
BASOPHILS NFR BLD AUTO: 0.9 %
BILIRUB SERPL-MCNC: 0.5 MG/DL (ref 0.1–2)
BUN BLD-MCNC: 13 MG/DL (ref 7–18)
CALCIUM BLD-MCNC: 9.6 MG/DL (ref 8.5–10.1)
CHLORIDE SERPL-SCNC: 107 MMOL/L (ref 98–112)
CO2 SERPL-SCNC: 28 MMOL/L (ref 21–32)
CREAT BLD-MCNC: 0.8 MG/DL
CRP SERPL-MCNC: 0.64 MG/DL (ref ?–0.3)
EGFRCR SERPLBLD CKD-EPI 2021: 86 ML/MIN/1.73M2 (ref 60–?)
EOSINOPHIL # BLD AUTO: 0.37 X10(3) UL (ref 0–0.7)
EOSINOPHIL NFR BLD AUTO: 3.8 %
ERYTHROCYTE [DISTWIDTH] IN BLOOD BY AUTOMATED COUNT: 12.8 %
ERYTHROCYTE [SEDIMENTATION RATE] IN BLOOD: 19 MM/HR
FASTING STATUS PATIENT QL REPORTED: NO
GLOBULIN PLAS-MCNC: 3.4 G/DL (ref 2.8–4.4)
GLUCOSE BLD-MCNC: 92 MG/DL (ref 70–99)
HCT VFR BLD AUTO: 42.6 %
HGB BLD-MCNC: 14.4 G/DL
IMM GRANULOCYTES # BLD AUTO: 0.03 X10(3) UL (ref 0–1)
IMM GRANULOCYTES NFR BLD: 0.3 %
LYMPHOCYTES # BLD AUTO: 2.36 X10(3) UL (ref 1–4)
LYMPHOCYTES NFR BLD AUTO: 24.3 %
MCH RBC QN AUTO: 28.6 PG (ref 26–34)
MCHC RBC AUTO-ENTMCNC: 33.8 G/DL (ref 31–37)
MCV RBC AUTO: 84.7 FL
MONOCYTES # BLD AUTO: 1.04 X10(3) UL (ref 0.1–1)
MONOCYTES NFR BLD AUTO: 10.7 %
NEUTROPHILS # BLD AUTO: 5.83 X10 (3) UL (ref 1.5–7.7)
NEUTROPHILS # BLD AUTO: 5.83 X10(3) UL (ref 1.5–7.7)
NEUTROPHILS NFR BLD AUTO: 60 %
OSMOLALITY SERPL CALC.SUM OF ELEC: 288 MOSM/KG (ref 275–295)
PLATELET # BLD AUTO: 299 10(3)UL (ref 150–450)
POTASSIUM SERPL-SCNC: 4 MMOL/L (ref 3.5–5.1)
PROT SERPL-MCNC: 7.2 G/DL (ref 6.4–8.2)
RBC # BLD AUTO: 5.03 X10(6)UL
SODIUM SERPL-SCNC: 139 MMOL/L (ref 136–145)
VIT B12 SERPL-MCNC: 473 PG/ML (ref 193–986)
VIT D+METAB SERPL-MCNC: 30.9 NG/ML (ref 30–100)
WBC # BLD AUTO: 9.7 X10(3) UL (ref 4–11)

## 2023-08-22 PROCEDURE — 85652 RBC SED RATE AUTOMATED: CPT | Performed by: INTERNAL MEDICINE

## 2023-08-22 PROCEDURE — 85025 COMPLETE CBC W/AUTO DIFF WBC: CPT | Performed by: INTERNAL MEDICINE

## 2023-08-22 PROCEDURE — 3074F SYST BP LT 130 MM HG: CPT | Performed by: INTERNAL MEDICINE

## 2023-08-22 PROCEDURE — 82607 VITAMIN B-12: CPT | Performed by: INTERNAL MEDICINE

## 2023-08-22 PROCEDURE — 3008F BODY MASS INDEX DOCD: CPT | Performed by: INTERNAL MEDICINE

## 2023-08-22 PROCEDURE — 3078F DIAST BP <80 MM HG: CPT | Performed by: INTERNAL MEDICINE

## 2023-08-22 PROCEDURE — 99214 OFFICE O/P EST MOD 30 MIN: CPT | Performed by: INTERNAL MEDICINE

## 2023-08-22 PROCEDURE — 80053 COMPREHEN METABOLIC PANEL: CPT | Performed by: INTERNAL MEDICINE

## 2023-08-22 PROCEDURE — 82306 VITAMIN D 25 HYDROXY: CPT | Performed by: INTERNAL MEDICINE

## 2023-08-22 PROCEDURE — 86140 C-REACTIVE PROTEIN: CPT | Performed by: INTERNAL MEDICINE

## 2023-08-22 RX ORDER — CHOLECALCIFEROL (VITAMIN D3) 50 MCG
TABLET ORAL
COMMUNITY

## 2023-08-23 DIAGNOSIS — M19.90 INFLAMMATORY ARTHRITIS: Primary | ICD-10-CM

## 2023-08-23 RX ORDER — METHYLPREDNISOLONE 4 MG/1
TABLET ORAL
Qty: 1 EACH | Refills: 0 | Status: SHIPPED | OUTPATIENT
Start: 2023-08-23

## 2023-10-02 ENCOUNTER — OFFICE VISIT (OUTPATIENT)
Dept: FAMILY MEDICINE CLINIC | Facility: CLINIC | Age: 58
End: 2023-10-02
Payer: COMMERCIAL

## 2023-10-02 VITALS
BODY MASS INDEX: 30 KG/M2 | SYSTOLIC BLOOD PRESSURE: 124 MMHG | WEIGHT: 177 LBS | OXYGEN SATURATION: 95 % | DIASTOLIC BLOOD PRESSURE: 76 MMHG | RESPIRATION RATE: 18 BRPM | HEART RATE: 73 BPM | TEMPERATURE: 97 F

## 2023-10-02 DIAGNOSIS — I10 ESSENTIAL HYPERTENSION: ICD-10-CM

## 2023-10-02 DIAGNOSIS — Z13.6 ENCOUNTER FOR SCREENING FOR CORONARY ARTERY DISEASE: ICD-10-CM

## 2023-10-02 DIAGNOSIS — Z00.00 ANNUAL PHYSICAL EXAM: Primary | ICD-10-CM

## 2023-10-02 DIAGNOSIS — R07.89 CHEST TIGHTNESS: ICD-10-CM

## 2023-10-02 DIAGNOSIS — Z12.31 SCREENING MAMMOGRAM FOR BREAST CANCER: ICD-10-CM

## 2023-10-02 DIAGNOSIS — E78.2 ELEVATED TRIGLYCERIDES WITH HIGH CHOLESTEROL: ICD-10-CM

## 2023-10-02 DIAGNOSIS — J30.89 SEASONAL ALLERGIC RHINITIS DUE TO OTHER ALLERGIC TRIGGER: ICD-10-CM

## 2023-10-02 DIAGNOSIS — R73.03 PREDIABETES: ICD-10-CM

## 2023-10-02 DIAGNOSIS — Z23 ENCOUNTER FOR IMMUNIZATION: ICD-10-CM

## 2023-10-02 DIAGNOSIS — G25.81 RESTLESS LEG: ICD-10-CM

## 2023-10-02 DIAGNOSIS — J45.20 MILD INTERMITTENT ASTHMA WITHOUT COMPLICATION: ICD-10-CM

## 2023-10-02 DIAGNOSIS — K21.9 GASTROESOPHAGEAL REFLUX DISEASE WITHOUT ESOPHAGITIS: ICD-10-CM

## 2023-10-02 DIAGNOSIS — I72.8 ANEURYSM, SPLENIC ARTERY (HCC): ICD-10-CM

## 2023-10-02 PROBLEM — S83.242A ACUTE MEDIAL MENISCUS TEAR OF LEFT KNEE: Status: RESOLVED | Noted: 2023-04-21 | Resolved: 2023-10-02

## 2023-10-02 PROBLEM — M62.838 MUSCLE SPASM: Status: RESOLVED | Noted: 2022-04-19 | Resolved: 2023-10-02

## 2023-10-02 PROBLEM — M77.01 MEDIAL EPICONDYLITIS OF RIGHT ELBOW: Status: RESOLVED | Noted: 2019-02-27 | Resolved: 2023-10-02

## 2023-10-02 PROBLEM — M77.11 RIGHT LATERAL EPICONDYLITIS: Status: RESOLVED | Noted: 2019-02-27 | Resolved: 2023-10-02

## 2023-10-02 PROBLEM — I49.3 PVC'S (PREMATURE VENTRICULAR CONTRACTIONS): Status: RESOLVED | Noted: 2023-05-01 | Resolved: 2023-10-02

## 2023-10-02 LAB
ATRIAL RATE: 74 BPM
CHOLEST SERPL-MCNC: 240 MG/DL (ref ?–200)
DEPRECATED HBV CORE AB SER IA-ACNC: 94.7 NG/ML
EST. AVERAGE GLUCOSE BLD GHB EST-MCNC: 131 MG/DL (ref 68–126)
FASTING PATIENT LIPID ANSWER: YES
HBA1C MFR BLD: 6.2 % (ref ?–5.7)
HDLC SERPL-MCNC: 39 MG/DL (ref 40–59)
IRON SATN MFR SERPL: 26 %
IRON SERPL-MCNC: 104 UG/DL
LDLC SERPL CALC-MCNC: 164 MG/DL (ref ?–100)
NONHDLC SERPL-MCNC: 201 MG/DL (ref ?–130)
P AXIS: 3 DEGREES
P-R INTERVAL: 146 MS
Q-T INTERVAL: 382 MS
QRS DURATION: 80 MS
QTC CALCULATION (BEZET): 424 MS
R AXIS: 13 DEGREES
T AXIS: 15 DEGREES
TIBC SERPL-MCNC: 396 UG/DL (ref 240–450)
TRANSFERRIN SERPL-MCNC: 266 MG/DL (ref 200–360)
TRIGL SERPL-MCNC: 198 MG/DL (ref 30–149)
TSI SER-ACNC: 0.95 MIU/ML (ref 0.36–3.74)
VENTRICULAR RATE: 74 BPM
VLDLC SERPL CALC-MCNC: 39 MG/DL (ref 0–30)

## 2023-10-02 PROCEDURE — 83540 ASSAY OF IRON: CPT | Performed by: NURSE PRACTITIONER

## 2023-10-02 PROCEDURE — 83036 HEMOGLOBIN GLYCOSYLATED A1C: CPT | Performed by: NURSE PRACTITIONER

## 2023-10-02 PROCEDURE — 82728 ASSAY OF FERRITIN: CPT | Performed by: NURSE PRACTITIONER

## 2023-10-02 PROCEDURE — 83550 IRON BINDING TEST: CPT | Performed by: NURSE PRACTITIONER

## 2023-10-02 PROCEDURE — 80061 LIPID PANEL: CPT | Performed by: NURSE PRACTITIONER

## 2023-10-02 PROCEDURE — 84443 ASSAY THYROID STIM HORMONE: CPT | Performed by: NURSE PRACTITIONER

## 2023-10-02 RX ORDER — METOPROLOL SUCCINATE 50 MG/1
50 TABLET, EXTENDED RELEASE ORAL DAILY
Qty: 90 TABLET | Refills: 3 | Status: SHIPPED | OUTPATIENT
Start: 2023-10-02

## 2023-10-02 RX ORDER — ASPIRIN 325 MG
TABLET ORAL
COMMUNITY
Start: 2023-05-05

## 2023-10-02 RX ORDER — ALBUTEROL SULFATE 90 UG/1
1-2 AEROSOL, METERED RESPIRATORY (INHALATION) EVERY 4 HOURS PRN
Qty: 1 EACH | Refills: 0 | Status: SHIPPED | OUTPATIENT
Start: 2023-10-02

## 2023-10-03 ENCOUNTER — TELEPHONE (OUTPATIENT)
Dept: FAMILY MEDICINE CLINIC | Facility: CLINIC | Age: 58
End: 2023-10-03

## 2023-10-03 DIAGNOSIS — E78.2 ELEVATED TRIGLYCERIDES WITH HIGH CHOLESTEROL: Primary | ICD-10-CM

## 2023-10-03 NOTE — TELEPHONE ENCOUNTER
Lipid order placed January 2024  Please help patient set up nurse visit for January 2024 or send reminder PRN

## 2023-10-03 NOTE — TELEPHONE ENCOUNTER
Pt states that she would like to just do a labs in January- - can we place orders and pt will make NV to come in and have those addressed?

## 2023-10-03 NOTE — TELEPHONE ENCOUNTER
----- Message from ROB Landaverde sent at 10/3/2023  5:51 AM CDT -----  A1C - increased compared to 5 months ago. Prediabetic range, considered diabetic if level gets above 6.4, she is at 6.2    LIPID - these values have worsened -   Triglycerides elevated - red meat, dairy products, pizza, sugary foods/drinks, and fast foods are typically to blame for increased triglycerides  LDL is elevated, this is considered \"bad\" cholesterol. Improve diet by limiting fried foods, red meat, deli meat, pastries, chips, butter, and ice cream.  Increase aerobic exercise, the goal is 10,000 steps a day or 2.5hours a week. Consider other exercises as well, such as ab work out, bicep curls, pelaton workouts. TSH - thyroid is functioning normally    IRON STUDIES - within normal range    I would like her to follow-up in 3 months for a weight check and to recheck fasting labs.   If she would prefer not to have an appointment, we will need to place lab orders for January 2024

## 2023-10-15 ENCOUNTER — HOSPITAL ENCOUNTER (OUTPATIENT)
Dept: CT IMAGING | Age: 58
Discharge: HOME | End: 2023-10-15
Attending: NURSE PRACTITIONER

## 2023-10-15 DIAGNOSIS — Z00.00 ANNUAL PHYSICAL EXAM: ICD-10-CM

## 2023-10-15 DIAGNOSIS — I10 ESSENTIAL HYPERTENSION: ICD-10-CM

## 2023-10-15 DIAGNOSIS — Z13.6 ENCOUNTER FOR SCREENING FOR CORONARY ARTERY DISEASE: ICD-10-CM

## 2023-10-24 ENCOUNTER — HOSPITAL ENCOUNTER (OUTPATIENT)
Dept: MAMMOGRAPHY | Age: 58
Discharge: HOME | End: 2023-10-24
Attending: NURSE PRACTITIONER

## 2023-10-24 DIAGNOSIS — Z00.00 ANNUAL PHYSICAL EXAM: ICD-10-CM

## 2023-10-24 DIAGNOSIS — Z12.31 SCREENING MAMMOGRAM FOR BREAST CANCER: ICD-10-CM

## 2023-10-24 PROCEDURE — 77063 BREAST TOMOSYNTHESIS BI: CPT | Performed by: NURSE PRACTITIONER

## 2023-10-24 PROCEDURE — 77067 SCR MAMMO BI INCL CAD: CPT | Performed by: NURSE PRACTITIONER

## 2023-10-26 ENCOUNTER — TELEPHONE (OUTPATIENT)
Dept: FAMILY MEDICINE CLINIC | Facility: CLINIC | Age: 58
End: 2023-10-26

## 2023-10-26 NOTE — TELEPHONE ENCOUNTER
----- Message from ROB Carroll sent at 10/26/2023  3:20 PM CDT -----  Calcium score suggests moderate atherosclerotic plaque   Recommending medication for cholesterol to reduce further risk(s)  Begin atorvastatin 10mg nightly, complete labs January 2024

## 2023-10-26 NOTE — TELEPHONE ENCOUNTER
Advised patient of N[place] Whitehead note below. Patient verbalized understanding. No further questions at this time.     Recall placed - repeat lipid in 3 months

## 2023-11-15 ENCOUNTER — LAB ENCOUNTER (OUTPATIENT)
Dept: LAB | Age: 58
End: 2023-11-15
Attending: INTERNAL MEDICINE
Payer: COMMERCIAL

## 2023-11-15 ENCOUNTER — OFFICE VISIT (OUTPATIENT)
Dept: RHEUMATOLOGY | Facility: CLINIC | Age: 58
End: 2023-11-15
Payer: COMMERCIAL

## 2023-11-15 VITALS
BODY MASS INDEX: 28.34 KG/M2 | SYSTOLIC BLOOD PRESSURE: 128 MMHG | OXYGEN SATURATION: 98 % | TEMPERATURE: 98 F | WEIGHT: 166 LBS | RESPIRATION RATE: 16 BRPM | HEART RATE: 84 BPM | DIASTOLIC BLOOD PRESSURE: 62 MMHG | HEIGHT: 64 IN

## 2023-11-15 DIAGNOSIS — Z84.0 FAMILY HISTORY OF PSORIASIS IN MOTHER: ICD-10-CM

## 2023-11-15 DIAGNOSIS — M19.90 INFLAMMATORY ARTHRITIS: ICD-10-CM

## 2023-11-15 DIAGNOSIS — M25.562 ACUTE PAIN OF LEFT KNEE: ICD-10-CM

## 2023-11-15 DIAGNOSIS — M11.20 CHONDROCALCINOSIS: ICD-10-CM

## 2023-11-15 DIAGNOSIS — Z86.61 HISTORY OF MYELITIS: ICD-10-CM

## 2023-11-15 DIAGNOSIS — M19.90 INFLAMMATORY ARTHRITIS: Primary | ICD-10-CM

## 2023-11-15 DIAGNOSIS — R79.82 ELEVATED C-REACTIVE PROTEIN (CRP): ICD-10-CM

## 2023-11-15 LAB
CRP SERPL-MCNC: 1.16 MG/DL (ref ?–0.3)
ERYTHROCYTE [SEDIMENTATION RATE] IN BLOOD: 8 MM/HR
RHEUMATOID FACT SERPL-ACNC: <10 IU/ML (ref ?–15)
URATE SERPL-MCNC: 5.5 MG/DL

## 2023-11-15 PROCEDURE — 86140 C-REACTIVE PROTEIN: CPT | Performed by: INTERNAL MEDICINE

## 2023-11-15 PROCEDURE — 84550 ASSAY OF BLOOD/URIC ACID: CPT | Performed by: INTERNAL MEDICINE

## 2023-11-15 PROCEDURE — 85652 RBC SED RATE AUTOMATED: CPT | Performed by: INTERNAL MEDICINE

## 2023-11-15 PROCEDURE — 3074F SYST BP LT 130 MM HG: CPT | Performed by: INTERNAL MEDICINE

## 2023-11-15 PROCEDURE — 86431 RHEUMATOID FACTOR QUANT: CPT | Performed by: INTERNAL MEDICINE

## 2023-11-15 PROCEDURE — 3078F DIAST BP <80 MM HG: CPT | Performed by: INTERNAL MEDICINE

## 2023-11-15 PROCEDURE — 86200 CCP ANTIBODY: CPT | Performed by: INTERNAL MEDICINE

## 2023-11-15 PROCEDURE — 99214 OFFICE O/P EST MOD 30 MIN: CPT | Performed by: INTERNAL MEDICINE

## 2023-11-15 PROCEDURE — 3008F BODY MASS INDEX DOCD: CPT | Performed by: INTERNAL MEDICINE

## 2023-11-15 RX ORDER — METHYLPREDNISOLONE 4 MG/1
TABLET ORAL
Qty: 1 EACH | Refills: 0 | Status: SHIPPED | OUTPATIENT
Start: 2023-11-15

## 2023-11-16 ENCOUNTER — TELEPHONE (OUTPATIENT)
Dept: RHEUMATOLOGY | Facility: CLINIC | Age: 58
End: 2023-11-16

## 2023-11-16 DIAGNOSIS — M19.90 INFLAMMATORY ARTHRITIS: Primary | ICD-10-CM

## 2023-11-16 DIAGNOSIS — R79.82 ELEVATED C-REACTIVE PROTEIN (CRP): ICD-10-CM

## 2023-11-16 LAB — CCP IGG SERPL-ACNC: 1.1 U/ML (ref 0–6.9)

## 2023-11-16 NOTE — TELEPHONE ENCOUNTER
Spoke to patient regarding the below message:    Please let pt know that statins can worsen joint pain/muscle pain but not clear if triggering the inflammation. Regardless, let's repeat ESR/CRP in 2 weeks. Her last blood test to test for RA- CCP was negative. Elizabeth Child, DO   [facility]   11/16/2023     Patient verbalized understanding and denied any questions at this time and stated that this sounds like a good idea. She states that in a couple of weeks she will go and have her ESR/CCP drawn. Please place orders for patient to complete.

## 2023-11-16 NOTE — TELEPHONE ENCOUNTER
Pt called back with condition update. Pt had appt with PA for Dr. Rula Shen. Knee looks good, swelling has gone down since starting the steroids. No fluid seen, felt that aspiration was not necessary at this time. No infection, no blood clot, highly unlikely tear, or injury. If anything, maybe slight MCL strain, but looks good. Has fu in 4-6 just to reexamine.

## 2023-11-16 NOTE — TELEPHONE ENCOUNTER
Called pt, notified to keep Dr. Loreto Horan updated with next appt, May have been a flare of CPPD. Pt voices understanding. Wondering if this could have anything to do with starting new Statin medication? Also wondering if Dr. Loreto Horan would like to repeat inflammatory markers, since CRP was elevated. pt next appt is not until March.

## 2023-11-17 NOTE — TELEPHONE ENCOUNTER
Patient notified that orders have been placed for her lab work.  Patient verbalized understanding and denied any questions at this time

## 2023-12-05 ENCOUNTER — OFFICE VISIT (OUTPATIENT)
Dept: FAMILY MEDICINE CLINIC | Facility: CLINIC | Age: 58
End: 2023-12-05
Payer: COMMERCIAL

## 2023-12-05 VITALS
HEART RATE: 87 BPM | DIASTOLIC BLOOD PRESSURE: 84 MMHG | WEIGHT: 166 LBS | OXYGEN SATURATION: 98 % | BODY MASS INDEX: 28.34 KG/M2 | HEIGHT: 64 IN | SYSTOLIC BLOOD PRESSURE: 156 MMHG | TEMPERATURE: 98 F | RESPIRATION RATE: 18 BRPM

## 2023-12-05 DIAGNOSIS — J02.9 SORE THROAT: ICD-10-CM

## 2023-12-05 DIAGNOSIS — J02.9 PHARYNGITIS, UNSPECIFIED ETIOLOGY: Primary | ICD-10-CM

## 2023-12-05 LAB
CONTROL LINE PRESENT WITH A CLEAR BACKGROUND (YES/NO): YES YES/NO
KIT LOT #: NORMAL NUMERIC
STREP GRP A CUL-SCR: NEGATIVE

## 2023-12-05 PROCEDURE — 87081 CULTURE SCREEN ONLY: CPT | Performed by: PHYSICIAN ASSISTANT

## 2023-12-05 PROCEDURE — 3077F SYST BP >= 140 MM HG: CPT | Performed by: PHYSICIAN ASSISTANT

## 2023-12-05 PROCEDURE — 99213 OFFICE O/P EST LOW 20 MIN: CPT | Performed by: PHYSICIAN ASSISTANT

## 2023-12-05 PROCEDURE — 3079F DIAST BP 80-89 MM HG: CPT | Performed by: PHYSICIAN ASSISTANT

## 2023-12-05 PROCEDURE — 87637 SARSCOV2&INF A&B&RSV AMP PRB: CPT | Performed by: PHYSICIAN ASSISTANT

## 2023-12-05 PROCEDURE — 3008F BODY MASS INDEX DOCD: CPT | Performed by: PHYSICIAN ASSISTANT

## 2023-12-05 PROCEDURE — 87880 STREP A ASSAY W/OPTIC: CPT | Performed by: PHYSICIAN ASSISTANT

## 2023-12-05 RX ORDER — LIDOCAINE HYDROCHLORIDE 20 MG/ML
SOLUTION OROPHARYNGEAL
Qty: 300 ML | Refills: 0 | Status: SHIPPED | OUTPATIENT
Start: 2023-12-05

## 2023-12-05 RX ORDER — AZITHROMYCIN 250 MG/1
TABLET, FILM COATED ORAL
Qty: 6 TABLET | Refills: 0 | Status: SHIPPED | OUTPATIENT
Start: 2023-12-05 | End: 2023-12-10

## 2023-12-18 ENCOUNTER — ANESTHESIA EVENT (OUTPATIENT)
Dept: ENDOSCOPY | Facility: HOSPITAL | Age: 58
End: 2023-12-18
Payer: COMMERCIAL

## 2023-12-18 ENCOUNTER — HOSPITAL ENCOUNTER (OUTPATIENT)
Facility: HOSPITAL | Age: 58
Setting detail: HOSPITAL OUTPATIENT SURGERY
Discharge: HOME | End: 2023-12-18
Attending: INTERNAL MEDICINE | Admitting: INTERNAL MEDICINE
Payer: COMMERCIAL

## 2023-12-18 ENCOUNTER — ANESTHESIA (OUTPATIENT)
Dept: ENDOSCOPY | Facility: HOSPITAL | Age: 58
End: 2023-12-18
Payer: COMMERCIAL

## 2023-12-18 VITALS
RESPIRATION RATE: 16 BRPM | HEIGHT: 64 IN | BODY MASS INDEX: 28.68 KG/M2 | DIASTOLIC BLOOD PRESSURE: 64 MMHG | WEIGHT: 168 LBS | OXYGEN SATURATION: 96 % | HEART RATE: 79 BPM | TEMPERATURE: 98 F | SYSTOLIC BLOOD PRESSURE: 131 MMHG

## 2023-12-18 DIAGNOSIS — R10.9 ABDOMINAL CRAMPING: ICD-10-CM

## 2023-12-18 DIAGNOSIS — K21.00 GASTROESOPHAGEAL REFLUX DISEASE WITH ESOPHAGITIS, UNSPECIFIED WHETHER HEMORRHAGE: ICD-10-CM

## 2023-12-18 DIAGNOSIS — R13.10 DYSPHAGIA, UNSPECIFIED TYPE: ICD-10-CM

## 2023-12-18 DIAGNOSIS — R12 HEARTBURN: ICD-10-CM

## 2023-12-18 DIAGNOSIS — R19.4 CHANGE IN BOWEL HABIT: ICD-10-CM

## 2023-12-18 DIAGNOSIS — R15.2 FECAL URGENCY: ICD-10-CM

## 2023-12-18 LAB — B-HCG UR QL: NEGATIVE

## 2023-12-18 PROCEDURE — 0DBL8ZX EXCISION OF TRANSVERSE COLON, VIA NATURAL OR ARTIFICIAL OPENING ENDOSCOPIC, DIAGNOSTIC: ICD-10-PCS | Performed by: INTERNAL MEDICINE

## 2023-12-18 PROCEDURE — 0DB68ZX EXCISION OF STOMACH, VIA NATURAL OR ARTIFICIAL OPENING ENDOSCOPIC, DIAGNOSTIC: ICD-10-PCS | Performed by: INTERNAL MEDICINE

## 2023-12-18 PROCEDURE — 88305 TISSUE EXAM BY PATHOLOGIST: CPT | Performed by: INTERNAL MEDICINE

## 2023-12-18 PROCEDURE — 81025 URINE PREGNANCY TEST: CPT

## 2023-12-18 PROCEDURE — 0DBK8ZX EXCISION OF ASCENDING COLON, VIA NATURAL OR ARTIFICIAL OPENING ENDOSCOPIC, DIAGNOSTIC: ICD-10-PCS | Performed by: INTERNAL MEDICINE

## 2023-12-18 RX ORDER — LIDOCAINE HYDROCHLORIDE 10 MG/ML
INJECTION, SOLUTION EPIDURAL; INFILTRATION; INTRACAUDAL; PERINEURAL AS NEEDED
Status: DISCONTINUED | OUTPATIENT
Start: 2023-12-18 | End: 2023-12-18 | Stop reason: SURG

## 2023-12-18 RX ORDER — SODIUM CHLORIDE, SODIUM LACTATE, POTASSIUM CHLORIDE, CALCIUM CHLORIDE 600; 310; 30; 20 MG/100ML; MG/100ML; MG/100ML; MG/100ML
INJECTION, SOLUTION INTRAVENOUS CONTINUOUS
Status: DISCONTINUED | OUTPATIENT
Start: 2023-12-18 | End: 2023-12-18

## 2023-12-18 RX ORDER — NALOXONE HYDROCHLORIDE 0.4 MG/ML
80 INJECTION, SOLUTION INTRAMUSCULAR; INTRAVENOUS; SUBCUTANEOUS AS NEEDED
Status: DISCONTINUED | OUTPATIENT
Start: 2023-12-18 | End: 2023-12-18

## 2023-12-18 RX ADMIN — SODIUM CHLORIDE, SODIUM LACTATE, POTASSIUM CHLORIDE, CALCIUM CHLORIDE: 600; 310; 30; 20 INJECTION, SOLUTION INTRAVENOUS at 12:36:00

## 2023-12-18 RX ADMIN — LIDOCAINE HYDROCHLORIDE 30 MG: 10 INJECTION, SOLUTION EPIDURAL; INFILTRATION; INTRACAUDAL; PERINEURAL at 12:39:00

## 2023-12-18 RX ADMIN — SODIUM CHLORIDE, SODIUM LACTATE, POTASSIUM CHLORIDE, CALCIUM CHLORIDE: 600; 310; 30; 20 INJECTION, SOLUTION INTRAVENOUS at 13:03:00

## 2023-12-18 NOTE — ANESTHESIA POSTPROCEDURE EVALUATION
Jean JACOBSON 53. Patient Status:  Hospital Outpatient Surgery   Age/Gender 66year old female MRN [id number]   Location [address] [facility] 28 Attending 3 Guilherme Hawk, [place], [address] [facility] Day # 0 PCP Maren Bach MD       Anesthesia Post-op Note    ESOPHAGOGASTRODUODENOSCOPY (EGD) with biopsies, COLONOSCOPY    Procedure Summary       Date: 12/18/23 Room / Location: [facility] ENDOSCOPY 02 / [facility] ENDOSCOPY    Anesthesia Start: 8707 Anesthesia Stop: 1310    Procedures:       ESOPHAGOGASTRODUODENOSCOPY (EGD) with biopsies, COLONOSCOPY      COLONOSCOPY Diagnosis:       Change in bowel habit      Abdominal cramping      Fecal urgency      Dysphagia, unspecified type      Gastroesophageal reflux disease with esophagitis, unspecified whether hemorrhage      Heartburn      (EGD: gastritis with erosions  COLON : polyps, diverticulosis, hemorrhoids)    Surgeons: Deangelo Calr DO Anesthesiologist: Arelis Pulido MD    Anesthesia Type: MAC ASA Status: 2            Anesthesia Type: MAC    Vitals Value Taken Time   /63 12/18/23 1312   Temp   12/18/23 1313   Pulse 83 12/18/23 1312   Resp 16 12/18/23 1310   SpO2 96 % 12/18/23 1312   Vitals shown include unfiled device data. Patient Location: Endoscopy    Anesthesia Type: MAC    Airway Patency: patent    Postop Pain Control: adequate    Mental Status: preanesthetic baseline    Nausea/Vomiting: none    Cardiopulmonary/Hydration status: stable euvolemic    Complications: no apparent anesthesia related complications    Postop vital signs: stable    Dental Exam: Unchanged from Preop    Patient to be discharged home when criteria met.

## 2023-12-18 NOTE — H&P
History & Physical Examination    Patient Name: Tristan Henderson  MRN: [id number]  CSN: [id number]  YOB: 1965    Diagnosis: gerd, dysphagia, irregular bowel habits    Present Illness: 67 y/o F history as above presents for EGD/Colonoscopy. Medications Prior to Admission   Medication Sig Dispense Refill Last Dose    atorvastatin 10 MG Oral Tab Take 1 tablet (10 mg total) by mouth nightly. 90 tablet 0 2023    metoprolol succinate ER 50 MG Oral Tablet 24 Hr Take 1 tablet (50 mg total) by mouth daily. 90 tablet 3 2023    albuterol 108 (90 Base) MCG/ACT Inhalation Aero Soln Inhale 1-2 puffs into the lungs every 4 (four) hours as needed for Wheezing or Shortness of Breath. 1 each 0 Past Month    Cholecalciferol (VITAMIN D) 50 MCG (2000 UT) Oral Tab Take by mouth. Past Week    Ascorbic Acid (VITAMIN C) 100 MG Oral Tab Take 1 tablet (100 mg total) by mouth daily. Past Week    Multiple Vitamins-Minerals (MULTI-VITAMIN/MINERALS) Oral Tab Take 1 tablet by mouth daily. Past Week    BACLOFEN 20 MG Oral Tab TAKE 1 TABLET BY MOUTH TWICE DAILY OR THREE TIMES DAILY (Patient taking differently: Take 1 tablet (20 mg total) by mouth 2 (two) times daily. TAKE 1 TABLET BY MOUTH TWICE DAILY OR THREE TIMES DAILY) 270 tablet 3 2023 at 0100    [] azithromycin (ZITHROMAX Z-CLAIRE) 250 MG Oral Tab Take 2 tablets (500 mg total) by mouth daily for 1 day, THEN 1 tablet (250 mg total) daily for 4 days. 6 tablet 0     Lidocaine Viscous HCl 2 % Mouth/Throat Solution 10 ml Swish and spit  q 3 hrs  prn. 300 mL 0      Current Facility-Administered Medications   Medication Dose Route Frequency    lactated ringers infusion   Intravenous Continuous    lactated ringers infusion   Intravenous Continuous    naloxone (Narcan) 0.4 MG/ML injection 80 mcg  80 mcg Intravenous PRN       Allergies:    Allergies   Allergen Reactions    Cephalosporins HIVES    Pcn [Bicillin C-R,] HIVES    Penicillins HIVES    Lisinopril Coughing     Side effect        Past Medical History:   Diagnosis Date    Abdominal distention 09/30/2018    Abdominal pain 09/30/2018    Acute medial meniscus tear of left knee     Formatting of this note might be different from the original.  Added automatically from request for surgery [id number]    Asthma     allergy induced asthma    Autoimmune disease ([address]     transverse myelitis     Back pain     Bloating 09/30/2018    Body piercing     Constipation 2017    Decorative tattoo     Diarrhea     Esophageal reflux     Essential hypertension 03/30/2023    Fatigue 09/30/2018    Flatulence/gas pain/belching 09/30/2018    Food intolerance 2017    Frequent urination 2016    Gastroesophageal reflux disease without esophagitis     Heartburn     Heavy menses     High blood pressure     High cholesterol 2016    no meds    History of adverse reaction to anesthesia     took longer to awaken and heart rate was a bit high    History of Clostridium difficile infection     7/2022    Hyperlipidemia     Indigestion 07/01/2018    Irregular bowel habits 2017    Leaking of urine 2016    Medial epicondylitis of right elbow 02/27/2019    Menses painful     Mild intermittent asthma without complication 84/39/7711    Muscle spasm     Nausea 09/30/2018    Painful swallowing 07/01/2018    PONV (postoperative nausea and vomiting)     Prediabetes 05/01/2023    Problems with swallowing 07/01/2018    PVC's (premature ventricular contractions) 05/01/2023    Right lateral epicondylitis     Splenic artery aneurysm ([address]     recent diagnosis    Stool incontinence 2018    Transverse myelitis ([address]     has residual neurologic symptoms, sees neuro    Uncomfortable fullness after meals 09/30/2018    Wears glasses      Past Surgical History:   Procedure Laterality Date    CHOLECYSTECTOMY      EGD      FOOT FRACTURE SURGERY  11/2017    no surgery    KNEE ARTHROSCOPY      left knee    KNEE SURGERY      OTHER SURGICAL HISTORY  2018    right elbow surgery Family History   Problem Relation Age of Onset    Lipids Father     Other (Other) Father 80        myelodysplasia    Hypertension Mother     Other (Other) Maternal Uncle         maternal family hx of anurysm    Other Maternal Uncle         maternal family hx of anurysm    Other (Other) Brother         sounds like PAD in legs, had some complications from treatment    Other (Other) Maternal Aunt         \"anuerysms in stomach\"     Social History     Tobacco Use    Smoking status: Never    Smokeless tobacco: Never   Substance Use Topics    Alcohol use: Yes     Alcohol/week: 0.0 standard drinks of alcohol     Comment: social       SYSTEM Check if Review is Normal Check if Physical Exam is Normal If not normal, please explain:   HEENT [ x] [x ]    NECK & BACK [ x] [ x]    HEART [ x] [x ]    LUNGS [ x] [ x]    ABDOMEN [ x] [x ]    UROGENITAL [ n/a] [ n/a]    EXTREMITIES [ x] [x ]    OTHER        [ x ] I have discussed the risks and benefits and alternatives with the patient/family. They understand and agree to proceed with plan of care. [ x ] I have reviewed the History and Physical done within the last 30 days. Any changes noted above.     Fidel Jordan DO  12/18/2023  12:39 PM

## 2024-01-08 ENCOUNTER — LAB ENCOUNTER (OUTPATIENT)
Dept: LAB | Age: 59
End: 2024-01-08
Attending: INTERNAL MEDICINE
Payer: COMMERCIAL

## 2024-01-08 DIAGNOSIS — M19.90 INFLAMMATORY ARTHRITIS: ICD-10-CM

## 2024-01-08 DIAGNOSIS — E78.2 ELEVATED TRIGLYCERIDES WITH HIGH CHOLESTEROL: ICD-10-CM

## 2024-01-08 DIAGNOSIS — R79.82 ELEVATED C-REACTIVE PROTEIN (CRP): ICD-10-CM

## 2024-01-08 LAB
CHOLEST SERPL-MCNC: 156 MG/DL (ref ?–200)
CRP SERPL-MCNC: 0.54 MG/DL (ref ?–0.3)
ERYTHROCYTE [SEDIMENTATION RATE] IN BLOOD: 18 MM/HR
FASTING PATIENT LIPID ANSWER: YES
HDLC SERPL-MCNC: 39 MG/DL (ref 40–59)
LDLC SERPL CALC-MCNC: 91 MG/DL (ref ?–100)
NONHDLC SERPL-MCNC: 117 MG/DL (ref ?–130)
TRIGL SERPL-MCNC: 145 MG/DL (ref 30–149)
VLDLC SERPL CALC-MCNC: 24 MG/DL (ref 0–30)

## 2024-01-08 PROCEDURE — 80061 LIPID PANEL: CPT

## 2024-01-08 PROCEDURE — 85652 RBC SED RATE AUTOMATED: CPT

## 2024-01-08 PROCEDURE — 86140 C-REACTIVE PROTEIN: CPT

## 2024-01-15 DIAGNOSIS — E78.2 ELEVATED TRIGLYCERIDES WITH HIGH CHOLESTEROL: ICD-10-CM

## 2024-01-15 RX ORDER — ATORVASTATIN CALCIUM 10 MG/1
10 TABLET, FILM COATED ORAL NIGHTLY
Qty: 90 TABLET | Refills: 0 | Status: SHIPPED | OUTPATIENT
Start: 2024-01-15

## 2024-01-15 NOTE — TELEPHONE ENCOUNTER
Cholesterol Medication Protocol [id number]/15/2024 11:14 AM   Protocol Details ALT < 80    ALT resulted within past year    Lipid panel within past 12 months    Appointment within past 12 or next 3 months          LOV 10/2/23    Last Refill 10/26/23 #90 Refill 0     Labs 1/8/24     Future Appointments   Date Time Provider Department Center   3/20/2024  9:30 AM Brenda Chapin DO [facility]   7/16/2024  2:00 PM Brenda Chapin DO [facility]

## 2024-01-29 PROBLEM — R93.1 AGATSTON CORONARY ARTERY CALCIUM SCORE BETWEEN 200 AND 399: Status: ACTIVE | Noted: 2023-12-07

## 2024-03-02 ENCOUNTER — OFFICE VISIT (OUTPATIENT)
Dept: FAMILY MEDICINE CLINIC | Facility: CLINIC | Age: 59
End: 2024-03-02
Payer: COMMERCIAL

## 2024-03-02 VITALS
SYSTOLIC BLOOD PRESSURE: 138 MMHG | WEIGHT: 165 LBS | BODY MASS INDEX: 28.17 KG/M2 | OXYGEN SATURATION: 97 % | DIASTOLIC BLOOD PRESSURE: 80 MMHG | HEART RATE: 95 BPM | TEMPERATURE: 98 F | RESPIRATION RATE: 16 BRPM | HEIGHT: 64 IN

## 2024-03-02 DIAGNOSIS — J01.00 ACUTE NON-RECURRENT MAXILLARY SINUSITIS: Primary | ICD-10-CM

## 2024-03-02 DIAGNOSIS — H10.32 ACUTE BACTERIAL CONJUNCTIVITIS OF LEFT EYE: ICD-10-CM

## 2024-03-02 RX ORDER — POLYMYXIN B SULFATE AND TRIMETHOPRIM 1; 10000 MG/ML; [USP'U]/ML
1 SOLUTION OPHTHALMIC
Qty: 1 EACH | Refills: 0 | Status: SHIPPED | OUTPATIENT
Start: 2024-03-02 | End: 2024-03-09

## 2024-03-02 RX ORDER — DOXYCYCLINE HYCLATE 100 MG
100 TABLET ORAL 2 TIMES DAILY
Qty: 14 TABLET | Refills: 0 | Status: SHIPPED | OUTPATIENT
Start: 2024-03-02 | End: 2024-03-09

## 2024-03-02 NOTE — PATIENT INSTRUCTIONS
Sinusitis:  1. Rest. Drink plenty of fluids.   2. Supportive care as discussed.   3. Doxycycline as prescribed.   4. Follow up with PMD in 4-5 days for re-eval. Go to the emergency department immediately if symptoms worsen, change, you develop chest discomfort, wheezing, shortness of breath, diarrhea, abdominal pain, or if you have any concerns.    Conjunctivitis:    1. Rest. Avoid rubbing or touching the eye when possible.  2. Polytrim eye drops as prescribed.  3. Wash hands frequently.  4. Compresses as discussed.  5. Follow up with PMD or [facility] in 3-4 days for reeval. Follow up sooner or go to the emergency department immediately if symptoms worsen, change, or if you have any questions or concerns.    *[facility]  [address], [place] [address]   [phone]

## 2024-03-02 NOTE — PROGRESS NOTES
CHIEF COMPLAINT:     Chief Complaint   Patient presents with    Sinus Problem     Started Monday, Swollen left cheek and left eye discharge   Covid February 15th       HPI:   Candie Liu is a 59 year old female who presents for concerns of a sinus infection. Patient reports sinus congestion/drainage for the past 2-3 weeks. Notes she tested positive for covid-19 on 2/15. Notes sinus congestion/pressure has linger and worsened this past week. Notes left sinus maxillary sinus feels clogged.  Denies any wheezing, chest discomfort, or shortness of breath. .  Treating symptoms with otc cold/allergy meds.   Tolerates PO well at home. No n/v/d.  Denies any other aggravating or relieving factors at home. Denies any other treatment attempts prior to arrival.  Pt also notes concerns for possible left eye conjunctivitis.  Patient reports left eye redness,  discharge,  itching,  eyelid/lash crusting that started this morning. .  Denies photophobia, pain with movement of eye, fever, cold symptoms, or contact with irritant. Denies any other aggravating or relieving factors at home. Denies any other treatment attempts prior to arrival.   Pt wears glasses.(Does not have them with her.) Denies contacts use.     Current Outpatient Medications   Medication Sig Dispense Refill    Doxycycline Hyclate 100 MG Oral Tab Take 1 tablet (100 mg total) by mouth 2 (two) times daily for 7 days. 14 tablet 0    polymyxin B-trimethoprim 48302-2.1 UNIT/ML-% Ophthalmic Solution Place 1 drop into the left eye Q3H While Awake for 7 days. Max doses per day: 6 doses 1 each 0    dicyclomine 10 MG Oral Cap Take 1 capsule (10 mg total) by mouth 3 (three) times daily as needed. 180 capsule 3    ATORVASTATIN 10 MG Oral Tab TAKE 1 TABLET(10 MG) BY MOUTH EVERY NIGHT 90 tablet 0    metoprolol succinate ER 50 MG Oral Tablet 24 Hr Take 1 tablet (50 mg total) by mouth daily. 90 tablet 3    albuterol 108 (90 Base) MCG/ACT Inhalation Aero Soln Inhale 1-2 puffs  into the lungs every 4 (four) hours as needed for Wheezing or Shortness of Breath. 1 each 0    Cholecalciferol (VITAMIN D) 50 MCG (2000 UT) Oral Tab Take by mouth.      Ascorbic Acid (VITAMIN C) 100 MG Oral Tab Take 1 tablet (100 mg total) by mouth daily.      Multiple Vitamins-Minerals (MULTI-VITAMIN/MINERALS) Oral Tab Take 1 tablet by mouth daily.      BACLOFEN 20 MG Oral Tab TAKE 1 TABLET BY MOUTH TWICE DAILY OR THREE TIMES DAILY (Patient taking differently: Take 1 tablet (20 mg total) by mouth 2 (two) times daily. TAKE 1 TABLET BY MOUTH TWICE DAILY OR THREE TIMES DAILY) 270 tablet 3      Past Medical History:   Diagnosis Date    Abdominal distention 09/30/2018    Abdominal pain 09/30/2018    Acute medial meniscus tear of left knee     Formatting of this note might be different from the original.  Added automatically from request for surgery [id number]    Arthritis     Asthma ([facility])     allergy induced asthma    Autoimmune disease ([facility])     transverse myelitis     Back pain     Bloating 09/30/2018    Body piercing     Constipation 2017    Decorative tattoo     Diarrhea     Diarrhea, unspecified 2021    Esophageal reflux     Essential hypertension 03/30/2023    Fatigue 09/30/2018    Flatulence/gas pain/belching 09/30/2018    Food intolerance 2017    Frequent urination 2016    Gastroesophageal reflux disease without esophagitis     Heartburn     Heavy menses     High blood pressure     High cholesterol 2016    no meds    History of adverse reaction to anesthesia     took longer to awaken and heart rate was a bit high    History of Clostridium difficile infection     7/2022    Hyperlipidemia     Indigestion 07/01/2018    Irregular bowel habits 2017    Leaking of urine 2016    Medial epicondylitis of right elbow 02/27/2019    Menses painful     Mild intermittent asthma without complication ([facility]) 04/19/2021    Muscle spasm     Nausea 09/30/2018    Painful swallowing 07/01/2018    PONV (postoperative nausea and vomiting)      Prediabetes 05/01/2023    Problems with swallowing 07/01/2018    PVC's (premature ventricular contractions) 05/01/2023    Right lateral epicondylitis     Sleep disturbance 2023    Splenic artery aneurysm ([facility])     recent diagnosis    Stool incontinence 2018    Transverse myelitis ([facility])     has residual neurologic symptoms, sees neuro    Uncomfortable fullness after meals 09/30/2018    Wears glasses       Past Surgical History:   Procedure Laterality Date    CHOLECYSTECTOMY      COLONOSCOPY N/A 12/18/2023    Procedure: COLONOSCOPY;  Surgeon: Zafar Johnson DO;  Location:  ENDOSCOPY    EGD      FOOT FRACTURE SURGERY  11/2017    no surgery    KNEE ARTHROSCOPY      left knee    KNEE SURGERY      OTHER SURGICAL HISTORY  2018    right elbow surgery         Social History     Socioeconomic History    Marital status:    Tobacco Use    Smoking status: Never    Smokeless tobacco: Never   Vaping Use    Vaping Use: Never used   Substance and Sexual Activity    Alcohol use: Yes     Comment: social    Drug use: No         REVIEW OF SYSTEMS:   GENERAL: Denies fever. Notes good appetite  SKIN: no rashes or abnormal skin lesions  EYES: + left eye redness crusting. Denies any eye pain or vision changes.  HENT: Denies sore throat, + sinus symptoms, Deniies ear pain  LUNGS: Denies cough, denies shortness of breath or wheezing,   CARDIOVASCULAR: denies chest pain or palpitations   GI: denies N/V/C or abdominal pain  NEURO: Denies headaches    EXAM:   /80   Pulse 95   Temp 98.4 °F (36.9 °C)   Resp 16   Ht 5' 4\" (1.626 m)   Wt 165 lb (74.8 kg)   SpO2 97%   BMI 28.32 kg/m²   GENERAL: well developed, well nourished,in no apparent distress  SKIN: no rashes,no suspicious lesions  HEAD: atraumatic, normocephalic.    EYES: PERRLA, EOMI, Right eye: conjunctiva does not  appear injected, no exudate or discharge.  No swelling or erythema noted to eyelids or periorbital areas. Pt appears comfortable and is able to keep  eye open without difficulty. No rapid blinking, excessive tearing or photophobia noted. No FB and no proptosis noted. Left eye: conjunctiva appears  injected, + exudate. No active discharge.  No swelling or erythema noted to eyelids or periorbital areas. Pt appears comfortable and is able to keep eye open without difficulty. No rapid blinking, excessive tearing or photophobia noted. No FB and no proptosis noted.   EARS: TM's intact and without erythema, no bulging, no retraction,no fluid, bony landmarks visualized. No erythema or swelling noted to ear canals or external ears.   NOSE: Nostrils patent, dried yellow nasal mucous,, nasal mucosa reddened   THROAT: Oral mucosa pink, moist. Posterior pharynx is mildly erythematous. No exudates. No tonsillar hypertrophy noted.  No trismus. Uvula midline with no swelling. Voice clear/normal. No stridor  NECK: Supple, non-tender  LUNGS: clear to auscultation bilaterally, no rales, wheezes or rhonchi. Breathing is non labored.  CARDIO: RRR without murmur  EXTREMITIES: no cyanosis, clubbing or edema  LYMPH:  No lymphadenopathy.        ASSESSMENT AND PLAN:       ICD-10-CM    1. Acute non-recurrent maxillary sinusitis  J01.00 Doxycycline Hyclate 100 MG Oral Tab     polymyxin B-trimethoprim 82510-6.1 UNIT/ML-% Ophthalmic Solution      2. Acute bacterial conjunctivitis of left eye  H10.32         Sinusitis:  Discussed physical exam and hpi with pt. Pt has reassuring physical exam consistent with sinusitis. We discussed viral vs allergy vs bacterial etiologies associated with sinusitis. Pt notes she would like to start abx today and declined delayed antimicrobial therapy option. Treatment options discussed with patient and explained in detail. Will start doxycycline today along with supportive care. The risks, benefits and potential side effects of possible medications were reviewed. Alternatives were discussed. Monitoring parameters and expected course outlined. Patient to call PCP  or go to emergency department if symptoms fail to respond as outlined, or worsen in any way. The patient agreed with the plan.     Conjunctivitis:  Discussed physical exam and hpi with pt. Pt has reassuring physical exam consistent with left eye bacterial conjunctivitis. Treatment options discussed with patient and explained in detail. Will start polytrim eye drops along with supportive care and follow up with PCP or Optho. The risks, benefits and potential side effects of possible medications were reviewed. Alternatives were discussed. Monitoring parameters and expected course outlined. Patient to call PCP or go to emergency department if symptoms fail to respond as outlined, or worsen in any way. The patient agreed with the plan.       Patient Instructions   Sinusitis:  1. Rest. Drink plenty of fluids.   2. Supportive care as discussed.   3. Doxycycline as prescribed.   4. Follow up with PMD in 4-5 days for re-eval. Go to the emergency department immediately if symptoms worsen, change, you develop chest discomfort, wheezing, shortness of breath, diarrhea, abdominal pain, or if you have any concerns.    Conjunctivitis:    1. Rest. Avoid rubbing or touching the eye when possible.  2. Polytrim eye drops as prescribed.  3. Wash hands frequently.  4. Compresses as discussed.  5. Follow up with PMD or [facility] in 3-4 days for reeval. Follow up sooner or go to the emergency department immediately if symptoms worsen, change, or if you have any questions or concerns.    *[facility]  [address], [place] [address]   [phone]        The patient indicates understanding of these issues and agrees to the plan.

## 2024-03-20 ENCOUNTER — OFFICE VISIT (OUTPATIENT)
Dept: RHEUMATOLOGY | Facility: CLINIC | Age: 59
End: 2024-03-20
Payer: COMMERCIAL

## 2024-03-20 VITALS
OXYGEN SATURATION: 96 % | HEIGHT: 64 IN | HEART RATE: 76 BPM | WEIGHT: 176 LBS | DIASTOLIC BLOOD PRESSURE: 64 MMHG | SYSTOLIC BLOOD PRESSURE: 130 MMHG | RESPIRATION RATE: 16 BRPM | TEMPERATURE: 97 F | BODY MASS INDEX: 30.05 KG/M2

## 2024-03-20 DIAGNOSIS — L56.8 PHOTOSENSITIVITY: ICD-10-CM

## 2024-03-20 DIAGNOSIS — M25.50 POLYARTHRALGIA: Primary | ICD-10-CM

## 2024-03-20 DIAGNOSIS — M11.20 CHONDROCALCINOSIS: ICD-10-CM

## 2024-03-20 DIAGNOSIS — R53.82 CHRONIC FATIGUE: ICD-10-CM

## 2024-03-20 DIAGNOSIS — R79.82 ELEVATED C-REACTIVE PROTEIN (CRP): ICD-10-CM

## 2024-03-20 PROCEDURE — 3075F SYST BP GE 130 - 139MM HG: CPT | Performed by: INTERNAL MEDICINE

## 2024-03-20 PROCEDURE — 3008F BODY MASS INDEX DOCD: CPT | Performed by: INTERNAL MEDICINE

## 2024-03-20 PROCEDURE — 3078F DIAST BP <80 MM HG: CPT | Performed by: INTERNAL MEDICINE

## 2024-03-20 PROCEDURE — 99214 OFFICE O/P EST MOD 30 MIN: CPT | Performed by: INTERNAL MEDICINE

## 2024-03-20 NOTE — PROGRESS NOTES
?  RHEUMATOLOGY FOLLOW UP   Date of visit: 03/20/2024  ?  Chief Complaint   Patient presents with    Follow - Up     4 month f/u. Feeling good. No joint pain or swelling. Knee feels much better since last visit. No new symptoms.     ?  ASSESSMENT, DISCUSSION & PLAN   Assessment:  1. Polyarthralgia    2. Chondrocalcinosis    3. Chronic fatigue    4. Photosensitivity    5. Elevated C-reactive protein (CRP)        Discussion:  Ms. Candie Liu is a 60 yo woman with a complicated past medical history (transverse myelitis with some residual leg weakness and tingling; splenic artery aneurysm, prior c diff infection) who presented for evaluation of recurrent left knee pain/swelling. She had the knee aspirated in September and while no crystals seen, xrays showed chondrocalcinosis suggestive of CPPD arthritis.   Her autoimmune workup was grossly negative (HLAB27, MARY JO/BOYD, RF,CCP) with exception of persistent elevated CRP. Discussed that this is nonspecific and she should still be sure she is up to date with cancer screening and screening for cardiovascular disease.- she has plans for cscope later this year.     Previously, she tried 7 days total of colchicine without any improvement of symptoms. However, the last steroid taper really helped symptoms.  Almost a year ago, she underwent left knee arthroscopy and has been recovering slowly, starting to be more active. Last year, she had right elbow pain without swelling but that radiated down into the index finger.  Some left elbow pain and significant left knee pain and swelling.  She was previously told there was chondrocalcinosis there and treated as if pseudogout flare.    She has a family history of psoriatic arthritis, so she is being monitored closely for this as well.   Since her last visit, she has been doing really well.   Will continue to monitor.   Given some of her rashes/sun sensitivity, will recheck MARY JO and check histone antibodies for completeness  barb.  Also briefly discussed importance of weight loss via diet and exercise.  Otherwise, she can follow up in 3-6 months depending on how she's feeling.   Encouraged to reach out in the meantime if symptoms flare.     Patient verbalized understanding of above instructions. No further questions at this time.    Code selection for this visit was based on time spent (30min) on date of service in preparing to see the patient, obtaining and/or reviewing separately obtained history, performing a medically appropriate examination, counseling and educating the patient/family/caregiver, ordering medications or testing, referring and communicating with other healthcare providers, documenting clinical information in the E HR, independently interpreting results and communicating results to the patient/family/caregiver and care coordination with the patient's other providers.    ?  Plan:  Diagnoses and all orders for this visit:    Polyarthralgia  -     Anti-Nuclear Antibody (MARY JO) by IFA, Reflex Titer + Specific Antibodies; Future  -     Antihistone Antibodies; Future    Chondrocalcinosis    Chronic fatigue  -     Anti-Nuclear Antibody (MARY JO) by IFA, Reflex Titer + Specific Antibodies; Future  -     Antihistone Antibodies; Future    Photosensitivity  -     Anti-Nuclear Antibody (MARY JO) by IFA, Reflex Titer + Specific Antibodies; Future  -     Antihistone Antibodies; Future    Elevated C-reactive protein (CRP)              Return in about 6 months (around 9/20/2024).  ?  HPI   Candie Liu is a 59 year old adult with the following active problems who was seen initially for evaluation of joint pain and elevated CRP. She presents for follow up today    Since her last visit, she has been doing well.  Doing really well from the left knee perspective. In May will be a year  Has been walking more and exercising slightly more without worsened pain or swelling.  Suffered covid infection in Feb- loss sense of smell and taste and  significant headache.    Had EGD/cscope which showed gastritis with erosions; colon with 2 poylp and diverticulosis. Told to avoid NSAIDs. Repeat cscope in 7 years. Was previously on omeprazole for a long period of time but stopped.   Does get heartburn intermittently- particularly   Took advil for two days around covid  Feeling some heartburn with tylenol     Prior hand pain and elbow pain subsided.  Can get some tightness in the right index finger MCP/PIP intermittent and not debilitating.     Was seen by cardiologist (had HTN, abnormal calcium score and started on statin by PCP)- told okay to follow up prn. Does feel like she is getting sun sensitivity with the metoprolol. Having worsened itchy bumps on arms/legs. Was also on antibiotc at that time.   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 Has appt in July HPI from initial consultation  referred for rheumatologic evaluation due to joint pain.     Has had a total of 3 separate occasions of left knee pain and swelling.   In July, had symptoms but xrays were negative was given NSAID and rest and she felt better.   In September, she had another bout but the NSAIDs and rest were insufficient. Initially went to immediate care and since she had elevated WBC, recommended ER visit. While in the ER, had it drained and discharged home. Was seen by orthopedic and recommended rheumatology evaluation.     Since September, she has been doing well overall until the past two days. She is starting to get some stiffness and swelling.   Denies any prior infections leading up to the flares.   Denies injury but admits to walking more prior to the Sept flare.     Does get elbow pain, would previously get recurrent right elbow tendonitis. 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 Did have surgery in May 2019 and noted to have some fraying. Since that time, pain is better overall.  Can get some left elbow pain.  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  Can get some right hip pain that can bother her at night, needing to switch sides  Can get some tightness in her fingers and stiffness without swelling.     + hx of transverse myelitis- 3 months after birth of daughter in 1997, her right leg had some tingling and sensation of leg being asleep and also had some low back pain. Then progressed where she couldn't walk and developed paralysis from breast down. Could not figure out cause of TM. Unclear if connected to epidural for delivery. Still suffers from chronic leg cramping and tingling. Also feels weakness in the legs. Follows with neurology yearly.   + hx of splenic artery aneurysm found incidentally when getting worked up for bowel issues- seen on abdominal plain films. Follows with vascular surgeon.   + hx of c diff infection, unclear trigger but symptoms have improved     + reflux/GERD previously on omeprazole. Can get sensation of food getting stuck. Takes pepcid prn but takes TUMS more  + hx of IBS- can alternate constipation/diarrhea worsened with stress. Never blood in stools. Prior mucous subsided.   + hx of yearly bronchitis/sinus infection   + currently going through menopause and fibroids along with continued menstrual periods despite hormone replacement. Follows with gyne for pelvic US.   + ace induced cough, improved since stopping lisinopril   + hx of fatty liver per imaging and pt does have elevated cholesterol   + dry eyes post-lasic and allergies   + plantar fasciitis, hx of traumatic fall and fx of the left heel requiring boot. Follows with podiatry- uses inserts/orthotics   + daily headaches, minor, thinks related to new BP meds    No history of significant morning stiffness or new skin nodule formation.  The patient denies hair loss, oral or nasal ulcers, photosensitive rash, elevated or scarring rashes, Raynaud's phenomenon, prior renal disease, or history of seizures.  No history of prior  blood clot in the legs or lungs, strokes or ischemic phenomenon  Denies nonhealing ulcers on the fingertips.   The patient denies any history of uveitis, nodular painful shin bruises, Achilles heel pain, psoriatic lesions, spooning or pitting of the nails, or history of dactylitis.  There are no symptoms of severe dry mouth, recurrent cavities, or swelling of the cheeks or under the jawbone.   No fevers, chills, lymphadenopathy, night sweats, unexpected weight loss, easy bruising or bleeding.  Denies epistaxis.  Denies chronic cough or hemoptysis.     Family hx:  Multiple maternal family hx of aneurysms (most aortic)         Past Medical History:  Past Medical History:   Diagnosis Date    Abdominal distention 09/30/2018    Abdominal pain 09/30/2018    Acute medial meniscus tear of left knee     Formatting of this note might be different from the original.  Added automatically from request for surgery [id number]    Arthritis     Asthma (HCC)     allergy induced asthma    Autoimmune disease (HCC)     transverse myelitis     Back pain     Bloating 09/30/2018    Body piercing     Constipation 2017    Decorative tattoo     Diarrhea     Diarrhea, unspecified 2021    Esophageal reflux     Essential hypertension 03/30/2023    Fatigue 09/30/2018    Flatulence/gas pain/belching 09/30/2018    Food intolerance 2017    Frequent urination 2016    Gastroesophageal reflux disease without esophagitis     Heartburn     Heavy menses     High blood pressure     High cholesterol 2016    no meds    History of adverse reaction to anesthesia     took longer to awaken and heart rate was a bit high    History of Clostridium difficile infection     7/2022    Hyperlipidemia     Indigestion 07/01/2018    Irregular bowel habits 2017    Leaking of urine 2016    Medial epicondylitis of right elbow 02/27/2019    Menses painful     Mild intermittent asthma without complication (HCC) 04/19/2021    Muscle spasm     Nausea 09/30/2018    Painful swallowing  07/01/2018    PONV (postoperative nausea and vomiting)     Prediabetes 05/01/2023    Problems with swallowing 07/01/2018    PVC's (premature ventricular contractions) 05/01/2023    Right lateral epicondylitis     Sleep disturbance 2023    Splenic artery aneurysm (HCC)     recent diagnosis    Stool incontinence 2018    Transverse myelitis (HCC)     has residual neurologic symptoms, sees neuro    Uncomfortable fullness after meals 09/30/2018    Wears glasses      Past Surgical History:  Past Surgical History:   Procedure Laterality Date    CHOLECYSTECTOMY      COLONOSCOPY N/A 12/18/2023    Procedure: COLONOSCOPY;  Surgeon: Zafar Johnson DO;  Location:  ENDOSCOPY    EGD      FOOT FRACTURE SURGERY  11/2017    no surgery    KNEE ARTHROSCOPY      left knee    KNEE SURGERY      OTHER SURGICAL HISTORY  2018    right elbow surgery     Family History:  Family History   Problem Relation Age of Onset    Lipids Father     Other (Other) Father 86        myelodysplasia    Hypertension Mother     Other (Other) Maternal Uncle         maternal family hx of anurysm    Bleeding Disorders Maternal Grandfather     Other (Other) Brother         sounds like PAD in legs, had some complications from treatment    Diabetes Brother     Other (Other) Maternal Aunt         \"anuerysms in stomach\"    Bleeding Disorders Maternal Aunt         Aortic anerysum     Social History:  Social History     Socioeconomic History    Marital status:    Tobacco Use    Smoking status: Never    Smokeless tobacco: Never   Vaping Use    Vaping Use: Never used   Substance and Sexual Activity    Alcohol use: Yes     Comment: social    Drug use: No     Medications:  Outpatient Medications Marked as Taking for the 3/20/24 encounter (Office Visit) with Brenda Chapin DO   Medication Sig Dispense Refill    ATORVASTATIN 10 MG Oral Tab TAKE 1 TABLET(10 MG) BY MOUTH EVERY NIGHT 90 tablet 0    metoprolol succinate ER 50 MG Oral Tablet 24 Hr Take 1 tablet (50 mg  total) by mouth daily. 90 tablet 3    albuterol 108 (90 Base) MCG/ACT Inhalation Aero Soln Inhale 1-2 puffs into the lungs every 4 (four) hours as needed for Wheezing or Shortness of Breath. 1 each 0    Cholecalciferol (VITAMIN D) 50 MCG (2000 UT) Oral Tab Take by mouth.      Ascorbic Acid (VITAMIN C) 100 MG Oral Tab Take 1 tablet (100 mg total) by mouth daily.      Multiple Vitamins-Minerals (MULTI-VITAMIN/MINERALS) Oral Tab Take 1 tablet by mouth daily.      BACLOFEN 20 MG Oral Tab TAKE 1 TABLET BY MOUTH TWICE DAILY OR THREE TIMES DAILY (Patient taking differently: Take 1 tablet (20 mg total) by mouth 2 (two) times daily. TAKE 1 TABLET BY MOUTH TWICE DAILY OR THREE TIMES DAILY) 270 tablet 3     Modified Medications    No medications on file     Medications Discontinued During This Encounter   Medication Reason    dicyclomine 10 MG Oral Cap      ?  ?  Allergies:  Allergies   Allergen Reactions    Cephalosporins HIVES    Pcn [Bicillin C-R,] HIVES    Penicillins HIVES    Lisinopril Coughing     Side effect      ?  REVIEW OF SYSTEMS   ?  Review of Systems   Constitutional:  Positive for malaise/fatigue. Negative for chills, fever and weight loss.   HENT:  Negative for congestion.    Eyes:  Negative for pain and redness.   Respiratory:  Negative for cough, shortness of breath and wheezing (with asthma/bronchitis).    Cardiovascular:  Negative for chest pain, palpitations and leg swelling.   Gastrointestinal:  Positive for abdominal pain, constipation, diarrhea and heartburn. Negative for blood in stool and nausea.   Genitourinary:  Positive for frequency and urgency. Negative for dysuria and hematuria.   Musculoskeletal:  Negative for back pain, joint pain, myalgias and neck pain.   Skin:  Positive for rash. Negative for itching.   Neurological:  Negative for dizziness, tingling, weakness and headaches.   Endo/Heme/Allergies:  Positive for environmental allergies. Bruises/bleeds easily.     PHYSICAL EXAM   Today's  Vitals:  Temperature Blood Pressure Heart Rate Resp Rate SpO2   Temp: 97 °F (36.1 °C) BP: 130/64 Pulse: 76 Resp: 16 SpO2: 96 %   ?  Current Weight Height BMI BSA Pain   Wt Readings from Last 1 Encounters:   03/20/24 176 lb (79.8 kg)    Height: 5' 4\" (162.6 cm) Body mass index is 30.21 kg/m². Body surface area is 1.85 meters squared.         Physical Exam  Vitals and nursing note reviewed.   Constitutional:       General: She is not in acute distress.     Appearance: Normal appearance. She is well-developed. She is not diaphoretic.   HENT:      Head: Normocephalic.   Eyes:      General: No scleral icterus.     Extraocular Movements: Extraocular movements intact.      Conjunctiva/sclera: Conjunctivae normal.   Neck:      Vascular: No JVD.      Trachea: No tracheal deviation.   Cardiovascular:      Rate and Rhythm: Normal rate and regular rhythm.      Heart sounds: Normal heart sounds. No murmur heard.  Pulmonary:      Effort: Pulmonary effort is normal. No respiratory distress.      Breath sounds: Normal breath sounds. No wheezing.   Musculoskeletal:         General: No swelling, tenderness or deformity.      Cervical back: Neck supple.      Comments: Early oa changes of fingers without synovitis or tenderness   Tenderness across pips.   No swelling, tenderness, redness or restriction of motion of the DIPs, MCPs, L wrist, L elbow, ankles, or joints of the feet.  Bilateral shoulders with full ROM, no evidence of impingement with provocative maneuvers.  Left knee post surgical, no longer tender, swollen or warm to touch.   Right knee benign.  Some right wrist and elbow tenderness without swelling. - resolved    Lymphadenopathy:      Cervical: No cervical adenopathy.   Skin:     General: Skin is warm and dry.      Findings: No erythema or rash.      Comments: No periungal erythema or digital pits   Neurological:      Mental Status: She is alert and oriented to person, place, and time.      Cranial Nerves: No cranial  nerve deficit.      Gait: Gait normal.   Psychiatric:         Mood and Affect: Mood normal.         Behavior: Behavior normal.       ?  Radiology review:     MR LEFT KNEE     HISTORY: Knee pain.     COMPARISON: MRI left knee 9/27/2022     TECHNIQUE: Multiplanar, multisequence MR imaging of the left knee was obtained without intravenous contrast.     FINDINGS:     LIGAMENTS:   The cruciate ligaments appear intact.   Low-grade thickening of the tibial collateral ligament with mild adjacent edema may be compatible with a type I low-grade sprain.   The lateral collateral ligament appears intact.     MENISCI AND FEMORAL-TIBIAL HYALINE CARTILAGE:   Large radial tear of the posterior horn medial meniscus adjacent to the root.   Moderate meniscal degenerative signal at the posterior and middle thirds.   4 mm medial meniscal extrusion.   The lateral meniscus appears intact.   There is mild tibiofemoral chondromalacia.     PATELLOFEMORAL JOINT AND EXTENSOR MECHANISM:   Extensor mechanism appears intact.   Patellofemoral chondromalacia is again demonstrated with 8mm full-thickness chondral defect of the upper median ridge and adjacent subcortical cyst formation.   Patellar fat pads intact.      OSSEOUS/BONE MARROW:   Mild marrow edema along the medial tibiofemoral joint line.     GENERAL:   Small joint effusion.   Probable low-grade popliteus muscle strain.   IMPRESSION:     Interval large radial tear of the posterior horn medial meniscus with adjacent meniscal degeneration and mild meniscal extrusion.     Interval grade 1 medial collateral ligament sprain.     Stable patellar chondromalacia with full-thickness defect of the superior patellar median ridge.     Probable low-grade popliteus muscle strain.     Small knee joint effusion.     FINAL REPORT   Attending Radiologist:  Syed Carranza MD   Date Signed Off:  04/12/2023 11:03       Exam: MRI PELVIS W/O CONTRAST   CPT Code(s): 26701 - MRI PELVIS W/O DYE     MRI SACROILIAC  JOINTS     HISTORY: Sacroiliitis, not elsewhere classified     COMPARISON: None currently available.     TECHNICAL: Routine MRI exam performed on a wide bore ultra high field 3.0 T Siemens Skyra MR scanner, without intravenous contrast.     FINDINGS:   Small marginal osteophytes at the right sacroiliac joint. Joint spaces are maintained bilaterally. No subchondral reactive marrow change or cyst formation.     Visualized lower lumbar spine demonstrates mild disc degeneration at the L4-5 and L5-S1 level with no significant central canal stenosis or neural foraminal narrowing. Sacrum and coccyx are intact. No fracture or other abnormal marrow signal. Bilateral   symmetric normal-appearing common origin of hamstrings.     IMPRESSION:   1. Small marginal osteophytes in the right SI joint. The SI joints are otherwise normal in appearance. No evidence for sacroiliitis.     Interpreting Radiologist:     Pierre Augustin M.D.   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       TECHNIQUE:  Frontal and oblique of the sacroiliac joints were obtained.       PATIENT STATED HISTORY: (As transcribed by Technologist)  Pt c/o chronic R sided SI joint pain and tightness into the hamstring.  No known injury.  No prev fx or surgery.            FINDINGS:  No acute fracture or dislocation is seen.  Very minimal osteophyte formation noted on the right along the inferior aspect.  Minimal sclerosis is present surrounding the sacroiliac joints.  If clinical symptoms persist then consider follow-up   imaging or MRI.                        Impression   CONCLUSION:  See above.           Dictated by (CST): Elfego Catalan MD on 2/03/2023 at 11:49 AM       Finalized by (CST): Elfego Catalan MD on  2/03/2023 at 11:50 AM      PROCEDURE:  XR LUMBAR SPINE (MIN 4 VIEWS) (CPT=72110)       LOCATION:  [facility]       TECHNIQUE:  AP, lateral, oblique, and coned down L5-S1 views were obtained.       COMPARISON:  None.       INDICATIONS:  G89.29 Chronic SI joint pain M53.3 Chronic SI joint pain M19.90 Inflammatory arthritis Z84.0 Family history of psoriasis in mother       PATIENT STATED HISTORY: (As transcribed by Technologist)  Pt c/o chronic R sided SI joint pain and tightness into the hamstring.  No known injury.  No prev fx or surgery.            FINDINGS:       There is normal lumbar lordosis.  No significant spondylolisthesis is present.  Vertebral bodies appear maintained in height.  Moderate facet arthropathy favored from L4 through S1.  Mild ventral osteophyte formation present from L3 through L5.  If   clinical symptoms persist then consider MRI.       Incidentally noted probable left renal stones measuring up to 1.8 x 2.1 cm.                        Impression   CONCLUSION:  See above.           Dictated by (CST): Elfego Catalan MD on 2/03/2023 at 11:42 AM       Finalized by (CST): Elfego Catalan MD on 2/03/2023 at 11:43 AM          MR LEFT KNEE     HISTORY: Left knee pain and swelling.  Evaluate for meniscus tear or loose body.  Possible inflammatory arthropathy.  No specific injury or trauma.     COMPARISON: Correlation with radiographs, 9/20/2022     TECHNIQUE: Multiplanar, multisequence MR imaging of the left knee was obtained without intravenous contrast.     FINDINGS:     LIGAMENTS:   ACL: Intact   PCL: Intact   MCL: Intact   LCL complex: Intact     MENISCI AND FEMORAL-TIBIAL HYALINE CARTILAGE:   Medial meniscus: No tear identified.   Lateral meniscus: No tear identified.     Medial compartment articular cartilage: Grade 1-2 chondromalacia weightbearing articular cartilage.  Suspect more focal subtle moderate to high-grade fissuring involving the inner central weightbearing medial femoral condyle with subtle  adjacent subchondral marrow edema (series 4, image [id number]).   Lateral compartment articular cartilage: Grade 1-2 chondromalacia weightbearing articular cartilage.  No focal chondral defect identified.     PATELLOFEMORAL JOINT AND EXTENSOR MECHANISM:   High-grade patellar chondromalacia preferentially involving the medial patellar facet and median ridge.  Patellar subchondral cystic change and mild reactive marrow edema.  Mild surface irregularity and fraying of the trochlear articular cartilage.  Quadriceps and patellar tendons appear intact.     OSSEOUS/BONE MARROW:   No acute or healing fracture identified.  Patellar subchondral reactive signal changes as above.  No erosions or periarticular reactive marrow edema.  Small marginal joint compartment osteophytes.     GENERAL:   Trace nonexpansile joint effusion.  Distention of a tiny slitlike Baker's cyst.  No discrete intra-articular bodies identified.       IMPRESSION:   No meniscus tear identified.     Intact ligaments.     High-grade patellar chondromalacia preferentially involving the medial facet and median ridge.     Trace nonexpansile effusion.     No erosions or periarticular reactive marrow edema identified.     No intra-articular body identified.     FINAL REPORT   Attending Radiologist:  Martell Irene MD   Date Signed Off:  09/27/2022 13:59    PROCEDURE:  CTA ABD (CPT=74175)       LOCATION:  [facility]         COMPARISON:  [facility], CT, CT ABD   PEL W/CONT, 12/06/2012, 9:15 AM.       INDICATIONS:  I72.8 Splenic artery aneurysm (HCC) R93.3 Abnormal finding on GI tract imaging       TECHNIQUE:   Contrast-enhanced multislice CT angiography of the abdominal aorta is performed using nonionic contrast.  Multiplanar 3D reconstructions are generated.  Dose reduction techniques were used. Dose information is transmitted to the ACR   (American College of Radiology) NRDR (National Radiology Data Registry) which includes the Dose Index Registry.        PATIENT STATED HISTORY:(As transcribed by Technologist)  Patient had a change in bowel habits and abnormal findings on a recent xray abdomen.        CONTRAST USED:  100cc of Omnipaque 350       FINDINGS:     LUNG BASES:  Stable.  No consolidation or pleural effusion.   LIVER:  Stable.  Diffuse low attenuation consistent with fatty infiltration.   BILIARY:  Stable.  Surgically absent gallbladder.  No biliary dilatation.   PANCREAS:  Stable.  Uniform parenchyma.  No ductal dilatation.   SPLEEN:  Stable.  Not enlarged.   KIDNEYS:  Stable.  Normal anatomic positions.  No hydronephrosis or perinephric stranding.  Uniform parenchymal enhancement.   ADRENALS:  Stable.  Not enlarged.   AORTA/VASCULAR:  There is smooth tapering of the abdominal aorta.  No abdominal aortic aneurysm.  Patent celiac artery, SMA and STEPHANIE.  Single renal arteries bilaterally.  Typical position of the left renal vein, anterior to the abdominal aorta.     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  It is heavily calcified.  Diameter is measured at 1.2 cm.  Progressive calcification compared to the prior.  Diameter remeasured on the prior, non CTA,   study at 1.1 cm.   RETROPERITONEUM:  Stable.  No adenopathy.   BOWEL/MESENTERY:  Stable.  Normal bowel caliber.  No colonic inflammation.  Moderate scattered stool.  No ascites.   ABDOMINAL WALL:  Stable.  Umbilical eventration.   BONES:  Stable.  Mild degenerative changes.   OTHER:  None.                            Impression   CONCLUSION:     1. Stable size distal splenic artery aneurysm with progressive calcification.   2. Hepatic steatosis.   3. Details as above.                    Dictated by (CST): Juan Carlos Wayne MD on 7/27/2022 at 7:56 AM       Finalized by (CST): Juan Carlos Wayne MD on 7/27/2022 at 8:50 AM       PROCEDURE:  XR KNEE ROUTINE (3 VIEWS), LEFT (CPT=73562)       TECHNIQUE:  Three views were obtained including patellar view.        COMPARISON:  None.       INDICATIONS:       PATIENT STATED HISTORY: (As transcribed by Technologist)  Pt. has left knee pain x 2 weeks.  Pt. feels her knee may be filled with fluid due to pain and swelling.  Pain medially and proximal to patella.             FINDINGS:  Joint spaces are normal.  Minimal patella spurs.  There is mild chondrocalcinosis.  No fracture, expansile lesion or erosion.  No joint effusion is suggested.        Impression   CONCLUSION:     1. Minimal patella spurs.   2. Mild chondrocalcinosis.       Dictated by (CST): Juan Carlos Wayne MD on 2/17/2022 at 5:07 PM       Finalized by (CST): Juan Carlos Wayne MD on 2/17/2022 at 5:08 PM      Labs:  Lab Results   Component Value Date    WBC 9.7 08/22/2023    RBC 5.03 08/22/2023    HGB 14.4 08/22/2023    HCT 42.6 08/22/2023    .0 08/22/2023    MCV 84.7 08/22/2023    MCH 28.6 08/22/2023    MCHC 33.8 08/22/2023    RDW 12.8 08/22/2023    NEPRELIM 5.83 08/22/2023    NEPERCENT 60.0 08/22/2023    LYPERCENT 24.3 08/22/2023    MOPERCENT 10.7 08/22/2023    EOPERCENT 3.8 08/22/2023    BAPERCENT 0.9 08/22/2023    NE 5.83 08/22/2023    LYMABS 2.36 08/22/2023    MOABSO 1.04 (H) 08/22/2023    EOABSO 0.37 08/22/2023    BAABSO 0.09 08/22/2023     Lab Results   Component Value Date    GLU 92 08/22/2023    BUN 13 08/22/2023    BUNCREA 14.9 04/19/2021    CREATSERUM 0.80 08/22/2023    ANIONGAP 4 08/22/2023     07/13/2017    GFRNAA 98 07/26/2022    GFRAA 113 07/26/2022    CA 9.6 08/22/2023    OSMOCALC 288 08/22/2023    ALKPHO 113 08/22/2023    AST 34 08/22/2023    ALT 56 08/22/2023    BILT 0.5 08/22/2023    TP 7.2 08/22/2023    ALB 3.8 08/22/2023    GLOBULIN 3.4 08/22/2023     08/22/2023    K 4.0 08/22/2023     08/22/2023    CO2 28.0 08/22/2023       Additional Labs:  01/2024  ESR 18 normal  CRP 0.54 borderline    08/2023  ESR 19 normal  CRP 0.64 borderline  B12 473 normal  Vit D 30.9 borderline     04/2023  ANCA, MPO, PR3 negative   ESR 21 normal  CRP 1.03  elevated     02/2023  HLA-B27 negative  IgA, IgG, IgM normal  CMP grossly normal with exception of elevated blood glucose  CBC grossly normal  MARY JO by IFA negative  CCP negative  RF negative  ESR 17 normal  CRP 1.05 elevated    09/2022  Synovial fluid analysis no birefrigment crystals; urine culture negative  Nucleated body cells 34, 445  CBC with WBC 12.3; hemoglobin 14.7; platelet 292  CMP grossly normal  ESR 27 borderline elevated  CRP 44.1 elevated (N<10)  Uric acid 4.9    Brenda Chapin DO  [facility]  03/20/2024

## 2024-04-16 ENCOUNTER — LAB ENCOUNTER (OUTPATIENT)
Dept: LAB | Age: 59
End: 2024-04-16
Attending: INTERNAL MEDICINE
Payer: COMMERCIAL

## 2024-04-16 DIAGNOSIS — L56.8 PHOTOSENSITIVITY: ICD-10-CM

## 2024-04-16 DIAGNOSIS — R53.82 CHRONIC FATIGUE: ICD-10-CM

## 2024-04-16 DIAGNOSIS — M25.50 POLYARTHRALGIA: ICD-10-CM

## 2024-04-16 PROCEDURE — 86235 NUCLEAR ANTIGEN ANTIBODY: CPT | Performed by: INTERNAL MEDICINE

## 2024-04-16 PROCEDURE — 86038 ANTINUCLEAR ANTIBODIES: CPT | Performed by: INTERNAL MEDICINE

## 2024-04-16 PROCEDURE — 86225 DNA ANTIBODY NATIVE: CPT | Performed by: INTERNAL MEDICINE

## 2024-04-16 PROCEDURE — 86039 ANTINUCLEAR ANTIBODIES (ANA): CPT | Performed by: INTERNAL MEDICINE

## 2024-04-17 LAB — NUCLEAR IGG TITR SER IF: POSITIVE {TITER}

## 2024-04-18 LAB
ANA NUCLEOLAR TITR SER IF: 80 {TITER}
ANTIHISTONE ABS: 0.3 UNITS
DSDNA AB TITR SER: <10 {TITER}

## 2024-04-19 LAB
CENTROMERE IGG SER-ACNC: <0.4 U/ML
ENA JO1 AB SER IA-ACNC: <0.3 U/ML
ENA RNP IGG SER IA-ACNC: <0.3 U/ML
ENA SCL70 IGG SER IA-ACNC: <0.6 U/ML
ENA SM IGG SER IA-ACNC: <0.7 U/ML
ENA SS-A IGG SER IA-ACNC: <0.4 U/ML
ENA SS-B IGG SER IA-ACNC: <0.4 U/ML
U1 SNRNP IGG SER IA-ACNC: <0.5 U/ML

## 2024-04-23 ENCOUNTER — TELEPHONE (OUTPATIENT)
Dept: RHEUMATOLOGY | Facility: CLINIC | Age: 59
End: 2024-04-23

## 2024-04-23 DIAGNOSIS — M25.50 POLYARTHRALGIA: Primary | ICD-10-CM

## 2024-04-23 NOTE — TELEPHONE ENCOUNTER
Called pt and left a voicemail asking how she was feeling. No documentation that I can see of her lab results, but will route to provider to reply regarding those.

## 2024-04-23 NOTE — TELEPHONE ENCOUNTER
Please let pt know that although MARY JO equivocal, remainder of labs for lupus are negative.   Unlikely that the rash is related to lupus so have her discuss further with dermatology  For now, can do a short course of steroids since this helped before.     Brenda Chapin, DO  [facility]  4/23/2024        Component      Latest Ref Rng 4/16/2024   Anti-SSA Antibody, IGG      <7 U/mL <0.4    Anti-SSB Antibody, IGG      <7 U/mL <0.4    Anti-Smith Antibody, IGG      <7 U/mL <0.7    Anti-U1RNP Antibody, IGG      <5 U/mL <0.5    Anti-RNP70 Antibody, IGG      <7 U/mL <0.3    Anti-Centromere Antibody, IGG      <7 U/mL <0.4    Anti-SCL70 Antibody, IGG      <7 U/mL <0.6    Anti-Vaishnavi-1 Antibody, IGG      <7 U/mL <0.3    MARY JO Titer/Pattern      <80  80 !    Reviewed By: Beka Orozco M.D.    Antihistone Abs      0.0 - 0.9 Units 0.3    MARY JO SCREEN WITH REFLEX (S)      Negative  Positive !    Anti Double Strand DNA      <10  <10

## 2024-04-24 NOTE — TELEPHONE ENCOUNTER
Called pt and informed her of her lab results. She states that she does see derm in July so that will be a good indicator with the rash that comes with sun exposure. She would like to have the steroids sent to her pharmacy on file. She's on the fence about actually needing them or not, but would like them on hand in case she decides to take them.

## 2024-04-25 ENCOUNTER — TELEPHONE (OUTPATIENT)
Dept: RHEUMATOLOGY | Facility: CLINIC | Age: 59
End: 2024-04-25

## 2024-04-25 RX ORDER — PREDNISONE 5 MG/1
TABLET ORAL
Qty: 30 TABLET | Refills: 0 | Status: SHIPPED | OUTPATIENT
Start: 2024-04-25

## 2024-05-17 ENCOUNTER — TELEPHONE (OUTPATIENT)
Dept: FAMILY MEDICINE CLINIC | Facility: CLINIC | Age: 59
End: 2024-05-17

## 2024-05-17 DIAGNOSIS — Z79.899 MEDICATION MANAGEMENT: Primary | ICD-10-CM

## 2024-05-17 NOTE — TELEPHONE ENCOUNTER
Pt reports she noticed her eyes not clear and white - looks more yellow - Dtr also noticed    Noticed for couple weeks now    Pt reports she started statin med January this year - pt takes 10 mg -     Pt has been having achey joints - since before starting statin - sees Rheum for this    Pt also reports, the other day - she hit leg to side of bed - gashed it - bled a lot  Pt states \"hoping nothing wrong with liver\"    Pt wanting to talk about statins for a while now  But her annual physical not due until Fall (October 2024)    Pt denied abd pain, no nausea/vomiting, no fevers    Pt has future appt made to discuss    Future Appointments   Date Time Provider Department Center   5/21/2024  8:00 AM Roxanne Mckinnon MD [facility] [place]     Advised pt that Dr. Bingham not in office today, will be back on Monday - she v/u. Advised pt will call her back if needs to be seen sooner or w/ recommendations - she v/u.     Pt reports she is willing to complete labs prior to appt if needed as well. Pt states she plans to come in fasting for appt. No further questions at this time    Please advise, thank you

## 2024-05-17 NOTE — TELEPHONE ENCOUNTER
Patient made a My Chart appointment for 5/21/24    Reason for visit:  Slightly yellow eyes    Ok to keep appointment?  Sooner needed?    Please adv  Thank you

## 2024-05-17 NOTE — TELEPHONE ENCOUNTER
Advised patient of Ayana RIVAS's note below. Patient verbalized understanding.   Pt reports her eyes are NOT \"yellow-yellow\" - states \"you really have to look\" - pt reports her spouse states her eyes look more red - pt does have dry eyes    Pt states she does not think this is serious enough to go to ER at this time. Pt reports will wait until future appt, but states she will go to ER if symptoms worsens. No further questions at this time.    Future Appointments   Date Time Provider Department Center   5/21/2024  8:00 AM Roxanne Mckinnon MD [facility]     Routing as FYI only

## 2024-05-17 NOTE — TELEPHONE ENCOUNTER
Called [facility] they will not do stat labs  Recommending ER for assessment of liver function and stat workup

## 2024-05-18 NOTE — TELEPHONE ENCOUNTER
Dr Dr Bingham, Order placed for CMP please have pt go and get this done at [facility] voice mail for patient with above note

## 2024-05-20 ENCOUNTER — LAB ENCOUNTER (OUTPATIENT)
Dept: LAB | Age: 59
End: 2024-05-20
Attending: FAMILY MEDICINE

## 2024-05-20 DIAGNOSIS — Z79.899 MEDICATION MANAGEMENT: ICD-10-CM

## 2024-05-20 LAB
ALBUMIN SERPL-MCNC: 3.9 G/DL (ref 3.4–5)
ALBUMIN/GLOB SERPL: 1.1 {RATIO} (ref 1–2)
ALP LIVER SERPL-CCNC: 102 U/L
ALT SERPL-CCNC: 50 U/L
ANION GAP SERPL CALC-SCNC: 5 MMOL/L (ref 0–18)
AST SERPL-CCNC: 27 U/L (ref 15–37)
BILIRUB SERPL-MCNC: 0.5 MG/DL (ref 0.1–2)
BUN BLD-MCNC: 11 MG/DL (ref 9–23)
CALCIUM BLD-MCNC: 9.3 MG/DL (ref 8.5–10.1)
CHLORIDE SERPL-SCNC: 109 MMOL/L (ref 98–112)
CO2 SERPL-SCNC: 27 MMOL/L (ref 21–32)
CREAT BLD-MCNC: 0.77 MG/DL
EGFRCR SERPLBLD CKD-EPI 2021: 90 ML/MIN/1.73M2 (ref 60–?)
FASTING STATUS PATIENT QL REPORTED: NO
GLOBULIN PLAS-MCNC: 3.7 G/DL (ref 2.8–4.4)
GLUCOSE BLD-MCNC: 118 MG/DL (ref 70–99)
OSMOLALITY SERPL CALC.SUM OF ELEC: 292 MOSM/KG (ref 275–295)
POTASSIUM SERPL-SCNC: 4.1 MMOL/L (ref 3.5–5.1)
PROT SERPL-MCNC: 7.6 G/DL (ref 6.4–8.2)
SODIUM SERPL-SCNC: 141 MMOL/L (ref 136–145)

## 2024-05-20 PROCEDURE — 80053 COMPREHEN METABOLIC PANEL: CPT | Performed by: FAMILY MEDICINE

## 2024-05-21 ENCOUNTER — OFFICE VISIT (OUTPATIENT)
Dept: FAMILY MEDICINE CLINIC | Facility: CLINIC | Age: 59
End: 2024-05-21

## 2024-05-21 VITALS
DIASTOLIC BLOOD PRESSURE: 78 MMHG | TEMPERATURE: 98 F | SYSTOLIC BLOOD PRESSURE: 124 MMHG | BODY MASS INDEX: 30 KG/M2 | HEART RATE: 77 BPM | OXYGEN SATURATION: 96 % | RESPIRATION RATE: 18 BRPM | WEIGHT: 176.63 LBS

## 2024-05-21 DIAGNOSIS — E78.2 ELEVATED TRIGLYCERIDES WITH HIGH CHOLESTEROL: Primary | ICD-10-CM

## 2024-05-21 DIAGNOSIS — G47.30 SLEEP DISORDER BREATHING: ICD-10-CM

## 2024-05-21 DIAGNOSIS — I25.10 CORONARY ARTERY DISEASE INVOLVING NATIVE CORONARY ARTERY OF NATIVE HEART WITHOUT ANGINA PECTORIS: ICD-10-CM

## 2024-05-21 DIAGNOSIS — R73.9 ELEVATED BLOOD SUGAR: ICD-10-CM

## 2024-05-21 DIAGNOSIS — I72.8 ANEURYSM, SPLENIC ARTERY (HCC): ICD-10-CM

## 2024-05-21 DIAGNOSIS — I49.9 CARDIAC ARRHYTHMIA, UNSPECIFIED CARDIAC ARRHYTHMIA TYPE: ICD-10-CM

## 2024-05-21 DIAGNOSIS — Z13.6 SCREENING, ISCHEMIC HEART DISEASE: ICD-10-CM

## 2024-05-21 LAB
ATRIAL RATE: 80 BPM
EST. AVERAGE GLUCOSE BLD GHB EST-MCNC: 131 MG/DL (ref 68–126)
HBA1C MFR BLD: 6.2 % (ref ?–5.7)
P AXIS: 48 DEGREES
P-R INTERVAL: 148 MS
Q-T INTERVAL: 376 MS
QRS DURATION: 80 MS
QTC CALCULATION (BEZET): 433 MS
R AXIS: 30 DEGREES
T AXIS: 30 DEGREES
VENTRICULAR RATE: 80 BPM

## 2024-05-21 PROCEDURE — 99214 OFFICE O/P EST MOD 30 MIN: CPT | Performed by: FAMILY MEDICINE

## 2024-05-21 PROCEDURE — 3074F SYST BP LT 130 MM HG: CPT | Performed by: FAMILY MEDICINE

## 2024-05-21 PROCEDURE — 93000 ELECTROCARDIOGRAM COMPLETE: CPT | Performed by: FAMILY MEDICINE

## 2024-05-21 PROCEDURE — 83036 HEMOGLOBIN GLYCOSYLATED A1C: CPT | Performed by: FAMILY MEDICINE

## 2024-05-21 PROCEDURE — 3078F DIAST BP <80 MM HG: CPT | Performed by: FAMILY MEDICINE

## 2024-05-21 RX ORDER — ATORVASTATIN CALCIUM 20 MG/1
20 TABLET, FILM COATED ORAL NIGHTLY
Qty: 90 TABLET | Refills: 0 | Status: SHIPPED | OUTPATIENT
Start: 2024-05-21

## 2024-05-21 NOTE — PROGRESS NOTES
Chief Complaint   Patient presents with    Other     Yellow eyes and discuss lab work    Medication Follow-Up     Discuss cholesterol medication         HPI  Pt is her for evaluation of yellow eyes and is worried about pancreatic cancer after losing a family member to this. She also describes sleep disordered breathing and difficulty staying asleep. Pt has a splenic artery aneurysm that is monitored by cardiology. She also has a history of asthma. Her last blood sugars were elevated and she is agreeable to getting an A1c    ROS  As per HPI and all other systems reviewed and are negative      Past Medical History:    Abdominal distention    Abdominal pain    Acute medial meniscus tear of left knee    Formatting of this note might be different from the original.  Added automatically from request for surgery [id number]    Arthritis    Asthma (HCC)    allergy induced asthma    Autoimmune disease (HCC)    transverse myelitis     Back pain    Bloating    Body piercing    Constipation    Decorative tattoo    Diarrhea    Diarrhea, unspecified    Esophageal reflux    Essential hypertension    Fatigue    Flatulence/gas pain/belching    Food intolerance    Frequent urination    Gastroesophageal reflux disease without esophagitis    Heartburn    Heavy menses    High blood pressure    High cholesterol    no meds    History of adverse reaction to anesthesia    took longer to awaken and heart rate was a bit high    History of Clostridium difficile infection    7/2022    Hyperlipidemia    Indigestion    Irregular bowel habits    Leaking of urine    Medial epicondylitis of right elbow    Menses painful    Mild intermittent asthma without complication (HCC)    Muscle spasm    Nausea    Painful swallowing    PONV (postoperative nausea and vomiting)    Prediabetes    Problems with swallowing    PVC's (premature ventricular contractions)    Right lateral epicondylitis    Sleep disturbance    Splenic artery aneurysm (HCC)    recent  diagnosis    Stool incontinence    Transverse myelitis (HCC)    has residual neurologic symptoms, sees neuro    Uncomfortable fullness after meals    Wears glasses       Past Surgical History:   Procedure Laterality Date    Cholecystectomy      Colonoscopy N/A 12/18/2023    Procedure: COLONOSCOPY;  Surgeon: Zafar Johnsno DO;  Location:  ENDOSCOPY    [facility]      Foot fracture surgery  11/2017    no surgery    Knee arthroscopy      left knee    Knee surgery      Other surgical history  2018    right elbow surgery       Social History     Socioeconomic History    Marital status:    Tobacco Use    Smoking status: Never    Smokeless tobacco: Never   Vaping Use    Vaping status: Never Used   Substance and Sexual Activity    Alcohol use: Not Currently     Comment: social    Drug use: No       Family History   Problem Relation Age of Onset    Lipids Father     Other (Other) Father 86        myelodysplasia    Hypertension Mother     Other (Other) Maternal Uncle         maternal family hx of anurysm    Bleeding Disorders Maternal Grandfather     Other (Other) Brother         sounds like PAD in legs, had some complications from treatment    Diabetes Brother     Other (Other) Maternal Aunt         \"anuerysms in stomach\"    Bleeding Disorders Maternal Aunt         Aortic anerysum        Current Outpatient Medications on File Prior to Visit   Medication Sig Dispense Refill    predniSONE 5 MG Oral Tab Take 20mg daily x 3 days, then 15mg daily x 3 days, then 10mg daily x 3 days, then 5mg daily x 3 days, then stop. (Patient not taking: Reported on 5/21/2024) 30 tablet 0    metoprolol succinate ER 50 MG Oral Tablet 24 Hr Take 1 tablet (50 mg total) by mouth daily. 90 tablet 3    Cholecalciferol (VITAMIN D) 50 MCG (2000 UT) Oral Tab Take by mouth.      Ascorbic Acid (VITAMIN C) 100 MG Oral Tab Take 1 tablet (100 mg total) by mouth daily.      Multiple Vitamins-Minerals (MULTI-VITAMIN/MINERALS) Oral Tab Take 1 tablet by mouth  daily.      BACLOFEN 20 MG Oral Tab TAKE 1 TABLET BY MOUTH TWICE DAILY OR THREE TIMES DAILY (Patient taking differently: Take 1 tablet (20 mg total) by mouth 2 (two) times daily. TAKE 1 TABLET BY MOUTH TWICE DAILY OR THREE TIMES DAILY) 270 tablet 3    albuterol 108 (90 Base) MCG/ACT Inhalation Aero Soln Inhale 1-2 puffs into the lungs every 4 (four) hours as needed for Wheezing or Shortness of Breath. (Patient not taking: Reported on 5/21/2024) 1 each 0     No current facility-administered medications on file prior to visit.         Objective  Vitals:    05/21/24 0758   BP: 124/78   Pulse: 77   Resp: 18   Temp: 98 °F (36.7 °C)   SpO2: 96%   Weight: 176 lb 9.6 oz (80.1 kg)     Physical Exam  Constitutional:       Appearance: Normal appearance.   HEENT:      Head: Normocephalic and atraumatic.      Eyes: PERRLA no notable nystagmus     Ears: normal on observation     Nose: Nose normal.      Mouth: Mucous membranes are moist.      Neck: no masses no bruit  Cardiovascular:      Rate and Rhythm: Normal rate and regular rhythm.   Pulmonary:      Effort: Pulmonary effort is normal.      Breath sounds: Normal breath sounds.   Abdominal:      General: Bowel sounds are normal.      Palpations: Abdomen is soft. There is no mass.   Musculoskeletal:         General: Normal range of motion.      Cervical back: Normal range of motion.   Skin:     General: Skin is warm and dry.   Neurological:      General: No focal deficit present.      Mental Status: She is alert and oriented to person, place, and time.   Psychiatric:         Mood and Affect: Mood normal.         Thought Content: Thought content normal.       Assessment and Plan  Candie was seen today for other and medication follow-up.    Diagnoses and all orders for this visit:    Elevated triglycerides with high cholesterol    Elevated blood sugar  -     Hemoglobin A1C [E]; Future  -     Hemoglobin A1C [E]    Aneurysm, splenic artery (HCC)    Coronary artery disease involving  native coronary artery of native heart without angina pectoris    Cardiac arrhythmia, unspecified cardiac arrhythmia type  -     EKG with interpretation and Report -IN OFFICE [47701] - NSR with PVCs    Sleep disorder breathing  -     Pulmonary Referral - [facility]    Screening, ischemic heart disease  -     Cardio Referral - Internal    Other orders  -     atorvastatin 20 MG Oral Tab; Take 1 tablet (20 mg total) by mouth nightly.           Follow up  No follow-ups on file.      Patient Instructions  There are no Patient Instructions on file for this visit.       Roxanne Mckinnon MD

## 2024-05-22 NOTE — PROGRESS NOTES
Left message for patient informing her that results have been reviewed and wanted to assure no further questions. Pt reviewed results on [facility] 5/22/2024. Additional mychart sent to verify no additional questions.

## 2024-06-04 ENCOUNTER — HOSPITAL ENCOUNTER (OUTPATIENT)
Dept: CT IMAGING | Age: 59
Discharge: HOME | End: 2024-06-04
Payer: COMMERCIAL

## 2024-06-04 ENCOUNTER — HOSPITAL ENCOUNTER (OUTPATIENT)
Dept: CT IMAGING | Age: 59
End: 2024-06-04
Payer: COMMERCIAL

## 2024-06-04 DIAGNOSIS — K29.70 GASTRITIS WITHOUT BLEEDING, UNSPECIFIED CHRONICITY, UNSPECIFIED GASTRITIS TYPE: ICD-10-CM

## 2024-06-04 DIAGNOSIS — R10.13 EPIGASTRIC PAIN: ICD-10-CM

## 2024-06-04 DIAGNOSIS — I72.8 SPLENIC ARTERY ANEURYSM (HCC): ICD-10-CM

## 2024-06-04 DIAGNOSIS — Z80.0 FAMILY HISTORY OF PANCREATIC CANCER: ICD-10-CM

## 2024-06-04 PROCEDURE — 74170 CT ABD WO CNTRST FLWD CNTRST: CPT

## 2024-07-01 ENCOUNTER — HOSPITAL ENCOUNTER (EMERGENCY)
Age: 59
Discharge: HOME | End: 2024-07-01
Attending: STUDENT IN AN ORGANIZED HEALTH CARE EDUCATION/TRAINING PROGRAM
Payer: COMMERCIAL

## 2024-07-01 ENCOUNTER — APPOINTMENT (OUTPATIENT)
Dept: CT IMAGING | Age: 59
End: 2024-07-01
Attending: STUDENT IN AN ORGANIZED HEALTH CARE EDUCATION/TRAINING PROGRAM
Payer: COMMERCIAL

## 2024-07-01 VITALS
TEMPERATURE: 98 F | HEIGHT: 64 IN | BODY MASS INDEX: 29.88 KG/M2 | RESPIRATION RATE: 16 BRPM | SYSTOLIC BLOOD PRESSURE: 139 MMHG | WEIGHT: 175 LBS | OXYGEN SATURATION: 98 % | HEART RATE: 87 BPM | DIASTOLIC BLOOD PRESSURE: 50 MMHG

## 2024-07-01 DIAGNOSIS — K57.92 ACUTE DIVERTICULITIS: Primary | ICD-10-CM

## 2024-07-01 LAB
ALBUMIN SERPL-MCNC: 4 G/DL (ref 3.4–5)
ALBUMIN/GLOB SERPL: 1 {RATIO} (ref 1–2)
ALP LIVER SERPL-CCNC: 121 U/L
ALT SERPL-CCNC: 37 U/L
ANION GAP SERPL CALC-SCNC: 7 MMOL/L (ref 0–18)
AST SERPL-CCNC: 18 U/L (ref 15–37)
BASOPHILS # BLD AUTO: 0.07 X10(3) UL (ref 0–0.2)
BASOPHILS NFR BLD AUTO: 0.4 %
BILIRUB SERPL-MCNC: 0.7 MG/DL (ref 0.1–2)
BILIRUB UR QL STRIP.AUTO: NEGATIVE
BUN BLD-MCNC: 10 MG/DL (ref 9–23)
CALCIUM BLD-MCNC: 10.2 MG/DL (ref 8.5–10.1)
CHLORIDE SERPL-SCNC: 105 MMOL/L (ref 98–112)
CLARITY UR REFRACT.AUTO: CLEAR
CO2 SERPL-SCNC: 26 MMOL/L (ref 21–32)
COLOR UR AUTO: YELLOW
CREAT BLD-MCNC: 0.78 MG/DL
EGFRCR SERPLBLD CKD-EPI 2021: 89 ML/MIN/1.73M2 (ref 60–?)
EOSINOPHIL # BLD AUTO: 0.11 X10(3) UL (ref 0–0.7)
EOSINOPHIL NFR BLD AUTO: 0.7 %
ERYTHROCYTE [DISTWIDTH] IN BLOOD BY AUTOMATED COUNT: 12.9 %
GLOBULIN PLAS-MCNC: 4 G/DL (ref 2.8–4.4)
GLUCOSE BLD-MCNC: 120 MG/DL (ref 70–99)
GLUCOSE UR STRIP.AUTO-MCNC: NEGATIVE MG/DL
HCT VFR BLD AUTO: 46.4 %
HGB BLD-MCNC: 15.9 G/DL
IMM GRANULOCYTES # BLD AUTO: 0.07 X10(3) UL (ref 0–1)
IMM GRANULOCYTES NFR BLD: 0.4 %
KETONES UR STRIP.AUTO-MCNC: NEGATIVE MG/DL
LIPASE SERPL-CCNC: 31 U/L (ref 13–75)
LYMPHOCYTES # BLD AUTO: 2.06 X10(3) UL (ref 1–4)
LYMPHOCYTES NFR BLD AUTO: 12.5 %
MCH RBC QN AUTO: 29.3 PG (ref 26–34)
MCHC RBC AUTO-ENTMCNC: 34.3 G/DL (ref 31–37)
MCV RBC AUTO: 85.6 FL
MONOCYTES # BLD AUTO: 1.41 X10(3) UL (ref 0.1–1)
MONOCYTES NFR BLD AUTO: 8.6 %
NEUTROPHILS # BLD AUTO: 12.73 X10 (3) UL (ref 1.5–7.7)
NEUTROPHILS # BLD AUTO: 12.73 X10(3) UL (ref 1.5–7.7)
NEUTROPHILS NFR BLD AUTO: 77.4 %
NITRITE UR QL STRIP.AUTO: NEGATIVE
OSMOLALITY SERPL CALC.SUM OF ELEC: 286 MOSM/KG (ref 275–295)
PH UR STRIP.AUTO: 5.5 [PH] (ref 5–8)
PLATELET # BLD AUTO: 289 10(3)UL (ref 150–450)
POTASSIUM SERPL-SCNC: 3.7 MMOL/L (ref 3.5–5.1)
PROT SERPL-MCNC: 8 G/DL (ref 6.4–8.2)
PROT UR STRIP.AUTO-MCNC: NEGATIVE MG/DL
RBC # BLD AUTO: 5.42 X10(6)UL
SODIUM SERPL-SCNC: 138 MMOL/L (ref 136–145)
SP GR UR STRIP.AUTO: <=1.005 (ref 1–1.03)
UROBILINOGEN UR STRIP.AUTO-MCNC: 0.2 MG/DL
WBC # BLD AUTO: 16.5 X10(3) UL (ref 4–11)

## 2024-07-01 PROCEDURE — 81015 MICROSCOPIC EXAM OF URINE: CPT | Performed by: STUDENT IN AN ORGANIZED HEALTH CARE EDUCATION/TRAINING PROGRAM

## 2024-07-01 PROCEDURE — 99284 EMERGENCY DEPT VISIT MOD MDM: CPT

## 2024-07-01 PROCEDURE — 99285 EMERGENCY DEPT VISIT HI MDM: CPT

## 2024-07-01 PROCEDURE — 80053 COMPREHEN METABOLIC PANEL: CPT | Performed by: STUDENT IN AN ORGANIZED HEALTH CARE EDUCATION/TRAINING PROGRAM

## 2024-07-01 PROCEDURE — 74177 CT ABD & PELVIS W/CONTRAST: CPT | Performed by: STUDENT IN AN ORGANIZED HEALTH CARE EDUCATION/TRAINING PROGRAM

## 2024-07-01 PROCEDURE — 85025 COMPLETE CBC W/AUTO DIFF WBC: CPT | Performed by: STUDENT IN AN ORGANIZED HEALTH CARE EDUCATION/TRAINING PROGRAM

## 2024-07-01 PROCEDURE — 81001 URINALYSIS AUTO W/SCOPE: CPT | Performed by: STUDENT IN AN ORGANIZED HEALTH CARE EDUCATION/TRAINING PROGRAM

## 2024-07-01 PROCEDURE — 87086 URINE CULTURE/COLONY COUNT: CPT | Performed by: STUDENT IN AN ORGANIZED HEALTH CARE EDUCATION/TRAINING PROGRAM

## 2024-07-01 PROCEDURE — 83690 ASSAY OF LIPASE: CPT | Performed by: STUDENT IN AN ORGANIZED HEALTH CARE EDUCATION/TRAINING PROGRAM

## 2024-07-01 PROCEDURE — 36415 COLL VENOUS BLD VENIPUNCTURE: CPT

## 2024-07-01 RX ORDER — DICYCLOMINE HCL 20 MG
20 TABLET ORAL 3 TIMES DAILY PRN
Qty: 30 TABLET | Refills: 0 | Status: SHIPPED | OUTPATIENT
Start: 2024-07-01 | End: 2024-07-31

## 2024-07-01 RX ORDER — METRONIDAZOLE 500 MG/1
500 TABLET ORAL 3 TIMES DAILY
Qty: 21 TABLET | Refills: 0 | Status: SHIPPED | OUTPATIENT
Start: 2024-07-01 | End: 2024-07-08

## 2024-07-01 RX ORDER — HYDROCODONE BITARTRATE AND ACETAMINOPHEN 5; 325 MG/1; MG/1
1-2 TABLET ORAL EVERY 6 HOURS PRN
Qty: 10 TABLET | Refills: 0 | Status: SHIPPED | OUTPATIENT
Start: 2024-07-01 | End: 2024-07-06

## 2024-07-01 RX ORDER — DICYCLOMINE HCL 20 MG
20 TABLET ORAL ONCE
Status: COMPLETED | OUTPATIENT
Start: 2024-07-01 | End: 2024-07-01

## 2024-07-01 RX ORDER — CIPROFLOXACIN 500 MG/1
500 TABLET, FILM COATED ORAL 2 TIMES DAILY
Qty: 14 TABLET | Refills: 0 | Status: SHIPPED | OUTPATIENT
Start: 2024-07-01 | End: 2024-07-08

## 2024-07-01 NOTE — ED PROVIDER NOTES
History     Chief Complaint   Patient presents with    Abdominal Pain       HPI    59 year old female with history of GERD, IBS presents with lower abdominal cramps associated with loose stools for the past couple days, reports today her abdomen feels like she got beat up.  No fevers or vomiting or bloody stools.  She does not take anything for her IBS at this time.  She does have some associated urinary urgency and sensation of tenesmus          Past Medical History:    Abdominal distention    Abdominal pain    Acute medial meniscus tear of left knee    Formatting of this note might be different from the original.  Added automatically from request for surgery [id number]    Arthritis    Asthma (HCC)    allergy induced asthma    Autoimmune disease (HCC)    transverse myelitis     Back pain    Bloating    Body piercing    Constipation    Decorative tattoo    Diarrhea    Diarrhea, unspecified    Esophageal reflux    Essential hypertension    Fatigue    Flatulence/gas pain/belching    Food intolerance    Frequent urination    Gastroesophageal reflux disease without esophagitis    Heartburn    Heavy menses    High blood pressure    High cholesterol    no meds    History of adverse reaction to anesthesia    took longer to awaken and heart rate was a bit high    History of Clostridium difficile infection    7/2022    Hyperlipidemia    Indigestion    Irregular bowel habits    Leaking of urine    Medial epicondylitis of right elbow    Menses painful    Mild intermittent asthma without complication (HCC)    Muscle spasm    Nausea    Painful swallowing    PONV (postoperative nausea and vomiting)    Prediabetes    Problems with swallowing    PVC's (premature ventricular contractions)    Right lateral epicondylitis    Sleep disturbance    Splenic artery aneurysm (HCC)    recent diagnosis    Stool incontinence    Transverse myelitis (HCC)    has residual neurologic symptoms, sees neuro    Uncomfortable fullness after meals     Wears glasses       Past Surgical History:   Procedure Laterality Date    Cholecystectomy      Colonoscopy N/A 12/18/2023    Procedure: COLONOSCOPY;  Surgeon: Zafar Johnson DO;  Location:  ENDOSCOPY    [facility]      Foot fracture surgery  11/2017    no surgery    Knee arthroscopy      left knee    Knee surgery      Other surgical history  2018    right elbow surgery       Social History     Socioeconomic History    Marital status:    Tobacco Use    Smoking status: Never    Smokeless tobacco: Never   Vaping Use    Vaping status: Never Used   Substance and Sexual Activity    Alcohol use: Not Currently     Comment: social    Drug use: No     Social Determinants of Health      Received from [facility], [facility]    Social Connections    Received from [facility], [facility]    Housing Stability                   Physical Exam     ED Triage Vitals [07/01/24 1114]   /84   Pulse 98   Resp 16   Temp 97.8 °F (36.6 °C)   Temp src Temporal   SpO2 100 %   O2 Device None (Room air)       Physical Exam  Constitutional:       General: She is in acute distress.   Eyes:      Extraocular Movements: Extraocular movements intact.   Cardiovascular:      Rate and Rhythm: Normal rate.      Pulses: Normal pulses.   Pulmonary:      Effort: Pulmonary effort is normal. No respiratory distress.   Abdominal:      Tenderness: There is abdominal tenderness in the periumbilical area and suprapubic area.   Musculoskeletal:      Cervical back: Normal range of motion.   Neurological:      General: No focal deficit present.      Mental Status: She is alert.              ED Course     Labs Reviewed   URINALYSIS WITH CULTURE REFLEX - Abnormal; Notable for the following components:       Result Value    Blood Urine Trace-lysed (*)     Leukocyte Esterase Urine Trace (*)     All other components within normal limits   UA MICROSCOPIC ONLY, URINE - Abnormal; Notable  for the following components:    Bacteria Urine 1+ (*)     Squamous Epi. Cells Few (*)     All other components within normal limits   COMP METABOLIC PANEL (14) - Abnormal; Notable for the following components:    Glucose 120 (*)     Calcium, Total 10.2 (*)     Alkaline Phosphatase 121 (*)     All other components within normal limits   CBC W/ DIFFERENTIAL - Abnormal; Notable for the following components:    WBC 16.5 (*)     RBC 5.42 (*)     Neutrophil Absolute Prelim 12.73 (*)     Neutrophil Absolute 12.73 (*)     Monocyte Absolute 1.41 (*)     All other components within normal limits   LIPASE - Normal   CBC WITH DIFFERENTIAL WITH PLATELET    Narrative:     The following orders were created for panel order CBC With Differential With Platelet.  Procedure                               Abnormality         Status                     ---------                               -----------         ------                     CBC W/ DIFFERENTIAL[[id number]]          Abnormal            Final result                 Please view results for these tests on the individual orders.   RAINBOW DRAW LAVENDER   RAINBOW DRAW LIGHT GREEN   URINE CULTURE, ROUTINE     CT ABDOMEN+PELVIS(CONTRAST ONLY)(CPT=74177)    Result Date: 7/1/2024  CONCLUSION:  Acute diverticulitis at the mid sigmoid colon.  No drainable fluid collection is identified.  Please see above for further details.   LOCATION:  [facility]   Dictated by (CST): Stromberg, LeRoy, MD on 7/01/2024 at 1:39 PM     Finalized by (CST): Stromberg, LeRoy, MD on 7/01/2024 at 1:48 PM            MDM     Vitals:    07/01/24 1114 07/01/24 1237   BP: 155/84 144/60   Pulse: 98 96   Resp: 16    Temp: 97.8 °F (36.6 °C)    TempSrc: Temporal    SpO2: 100% 97%   Weight: 79.4 kg    Height: 162.6 cm (5' 4\")        Enterocolitis, gastroenteritis, diverticulitis, IBS flare, cystitis on differential  Abd without peritonitis      ED Course as of 07/01/24  1358  ------------------------------------------------------------  Time: 07/01 1356  Comment: Labs with leukocytosis, reassuring renal function and LFTs.  Lipase normal.  My interpretation of CT with no free air.  Radiology is reading acute diverticulitis.  Will start patient on antibiotics.    Discussed all findings.  Patient is feeling well to be discharged.  Vitals remain stable.  Ambulating without difficulty.  Given written and verbal instructions regarding this condition and strict return precautions.   Will follow up in clinic.   Patient verbalizes understanding of instructions and follow up plan, as well as indications to return to the emergency department.           Disposition and Plan     Clinical Impression:  1. Acute diverticulitis        Disposition:  Discharge    Follow-up:  Roxanne Mckinnon MD  [address]  [facility] [address]  [phone]    Follow up        Medications Prescribed:  Current Discharge Medication List        START taking these medications    Details   ciprofloxacin 500 MG Oral Tab Take 1 tablet (500 mg total) by mouth 2 (two) times daily for 7 days.  Qty: 14 tablet, Refills: 0      metRONIDAZOLE 500 MG Oral Tab Take 1 tablet (500 mg total) by mouth 3 (three) times daily for 7 days.  Qty: 21 tablet, Refills: 0      dicyclomine 20 MG Oral Tab Take 1 tablet (20 mg total) by mouth 3 (three) times daily as needed (abdominal cramps).  Qty: 30 tablet, Refills: 0      HYDROcodone-acetaminophen 5-325 MG Oral Tab Take 1-2 tablets by mouth every 6 (six) hours as needed for Pain.  Qty: 10 tablet, Refills: 0    Associated Diagnoses: Acute diverticulitis

## 2024-07-01 NOTE — ED INITIAL ASSESSMENT (HPI)
Started Saturday morning with low abdom pain, having diarrhea, denies nausea/vomiting/fever, did have chills, urinary pressure and feeling of having to move bowels, history of IBS, had popcorn on Friday

## 2024-07-18 ENCOUNTER — LAB ENCOUNTER (OUTPATIENT)
Dept: LAB | Age: 59
End: 2024-07-18
Attending: NURSE PRACTITIONER
Payer: COMMERCIAL

## 2024-07-18 DIAGNOSIS — E78.2 ELEVATED TRIGLYCERIDES WITH HIGH CHOLESTEROL: ICD-10-CM

## 2024-07-18 LAB
CHOLEST SERPL-MCNC: 167 MG/DL (ref ?–200)
FASTING PATIENT LIPID ANSWER: YES
HDLC SERPL-MCNC: 38 MG/DL (ref 40–59)
LDLC SERPL CALC-MCNC: 94 MG/DL (ref ?–100)
NONHDLC SERPL-MCNC: 129 MG/DL (ref ?–130)
TRIGL SERPL-MCNC: 203 MG/DL (ref 30–149)
VLDLC SERPL CALC-MCNC: 33 MG/DL (ref 0–30)

## 2024-07-18 PROCEDURE — 80061 LIPID PANEL: CPT | Performed by: NURSE PRACTITIONER

## 2024-07-19 RX ORDER — ATORVASTATIN CALCIUM 20 MG/1
20 TABLET, FILM COATED ORAL NIGHTLY
Qty: 90 TABLET | Refills: 0 | Status: SHIPPED | OUTPATIENT
Start: 2024-07-19

## 2024-07-19 NOTE — TELEPHONE ENCOUNTER
Cholesterol Medication Protocol [id number]/19/2024 08:03 AM   Protocol Details ALT < 80    ALT resulted within past year    Lipid panel within past 12 months    In person appointment or virtual visit in the past 12 mos or appointment in next 3 mos   Refilled per protocol  atorvastatin 20 MG Oral Tab   Last refilled on 5/21/24 #90 with 0 rf.  LOV- 5/21/24  Last labs- 7/18/24    Sent to pharmacy

## 2024-08-05 DIAGNOSIS — I10 ESSENTIAL HYPERTENSION: ICD-10-CM

## 2024-08-05 RX ORDER — METOPROLOL SUCCINATE 50 MG/1
50 TABLET, EXTENDED RELEASE ORAL DAILY
Qty: 90 TABLET | Refills: 0 | Status: SHIPPED | OUTPATIENT
Start: 2024-08-05

## 2024-08-05 NOTE — TELEPHONE ENCOUNTER
Last refill: 10/02/2023 #90 with 3 refills  Last Visit: 5/21/2024   Next Visit:   Future Appointments   Date Time Provider Department Center   10/3/2024  9:00 AM Roxanne Mckinnon MD [facility]     Lab Results   Component Value Date     (H) 07/01/2024    BUN 10 07/01/2024    BUNCREA 14.9 04/19/2021    CREATSERUM 0.78 07/01/2024    ANIONGAP 7 07/01/2024     07/13/2017    GFRNAA 98 07/26/2022    GFRAA 113 07/26/2022    CA 10.2 (H) 07/01/2024    OSMOCALC 286 07/01/2024    ALKPHO 121 (H) 07/01/2024    AST 18 07/01/2024    ALT 37 07/01/2024    BILT 0.7 07/01/2024    TP 8.0 07/01/2024    ALB 4.0 07/01/2024    GLOBULIN 4.0 07/01/2024     07/01/2024    K 3.7 07/01/2024     07/01/2024    CO2 26.0 07/01/2024 7/1/2024    12:37 PM 7/1/2024     2:00 PM   Vitals History   /60 139/50   Pulse 96 87   SpO2 97 % 98 %          Forward to Dr. Bingham please advise on refills. Thanks.

## 2024-10-03 ENCOUNTER — OFFICE VISIT (OUTPATIENT)
Dept: FAMILY MEDICINE CLINIC | Facility: CLINIC | Age: 59
End: 2024-10-03
Payer: COMMERCIAL

## 2024-10-03 VITALS
TEMPERATURE: 97 F | BODY MASS INDEX: 30.96 KG/M2 | OXYGEN SATURATION: 97 % | RESPIRATION RATE: 18 BRPM | SYSTOLIC BLOOD PRESSURE: 114 MMHG | WEIGHT: 181.38 LBS | DIASTOLIC BLOOD PRESSURE: 82 MMHG | HEIGHT: 64.17 IN | HEART RATE: 84 BPM

## 2024-10-03 DIAGNOSIS — Z78.0 POST-MENOPAUSAL: ICD-10-CM

## 2024-10-03 DIAGNOSIS — Z83.3 FAMILY HISTORY OF DIABETES MELLITUS: ICD-10-CM

## 2024-10-03 DIAGNOSIS — Z23 FLU VACCINE NEED: ICD-10-CM

## 2024-10-03 DIAGNOSIS — Z12.31 ENCOUNTER FOR SCREENING MAMMOGRAM FOR MALIGNANT NEOPLASM OF BREAST: ICD-10-CM

## 2024-10-03 DIAGNOSIS — Z00.00 WELL ADULT EXAM: Primary | ICD-10-CM

## 2024-10-03 PROBLEM — K57.92 ACUTE DIVERTICULITIS: Status: RESOLVED | Noted: 2024-07-29 | Resolved: 2024-10-03

## 2024-10-03 LAB
ALBUMIN SERPL-MCNC: 4.5 G/DL (ref 3.2–4.8)
ALBUMIN/GLOB SERPL: 1.5 {RATIO} (ref 1–2)
ALP LIVER SERPL-CCNC: 109 U/L
ALT SERPL-CCNC: 42 U/L
ANION GAP SERPL CALC-SCNC: 3 MMOL/L (ref 0–18)
AST SERPL-CCNC: 28 U/L (ref ?–34)
BASOPHILS # BLD AUTO: 0.06 X10(3) UL (ref 0–0.2)
BASOPHILS NFR BLD AUTO: 0.8 %
BILIRUB SERPL-MCNC: 0.5 MG/DL (ref 0.3–1.2)
BUN BLD-MCNC: 11 MG/DL (ref 9–23)
CALCIUM BLD-MCNC: 10.3 MG/DL (ref 8.7–10.4)
CHLORIDE SERPL-SCNC: 108 MMOL/L (ref 98–112)
CHOLEST SERPL-MCNC: 154 MG/DL (ref ?–200)
CO2 SERPL-SCNC: 27 MMOL/L (ref 21–32)
CREAT BLD-MCNC: 0.72 MG/DL
EGFRCR SERPLBLD CKD-EPI 2021: 97 ML/MIN/1.73M2 (ref 60–?)
EOSINOPHIL # BLD AUTO: 0.26 X10(3) UL (ref 0–0.7)
EOSINOPHIL NFR BLD AUTO: 3.4 %
ERYTHROCYTE [DISTWIDTH] IN BLOOD BY AUTOMATED COUNT: 12.6 %
EST. AVERAGE GLUCOSE BLD GHB EST-MCNC: 143 MG/DL (ref 68–126)
FASTING PATIENT LIPID ANSWER: YES
FASTING STATUS PATIENT QL REPORTED: YES
GLOBULIN PLAS-MCNC: 3.1 G/DL (ref 2–3.5)
GLUCOSE BLD-MCNC: 115 MG/DL (ref 70–99)
HBA1C MFR BLD: 6.6 % (ref ?–5.7)
HCT VFR BLD AUTO: 45.2 %
HDLC SERPL-MCNC: 40 MG/DL (ref 40–59)
HGB BLD-MCNC: 15.5 G/DL
IMM GRANULOCYTES # BLD AUTO: 0.02 X10(3) UL (ref 0–1)
IMM GRANULOCYTES NFR BLD: 0.3 %
LDLC SERPL CALC-MCNC: 88 MG/DL (ref ?–100)
LYMPHOCYTES # BLD AUTO: 1.58 X10(3) UL (ref 1–4)
LYMPHOCYTES NFR BLD AUTO: 20.5 %
MCH RBC QN AUTO: 29.5 PG (ref 26–34)
MCHC RBC AUTO-ENTMCNC: 34.3 G/DL (ref 31–37)
MCV RBC AUTO: 85.9 FL
MONOCYTES # BLD AUTO: 0.87 X10(3) UL (ref 0.1–1)
MONOCYTES NFR BLD AUTO: 11.3 %
NEUTROPHILS # BLD AUTO: 4.92 X10 (3) UL (ref 1.5–7.7)
NEUTROPHILS # BLD AUTO: 4.92 X10(3) UL (ref 1.5–7.7)
NEUTROPHILS NFR BLD AUTO: 63.7 %
NONHDLC SERPL-MCNC: 114 MG/DL (ref ?–130)
OSMOLALITY SERPL CALC.SUM OF ELEC: 286 MOSM/KG (ref 275–295)
PLATELET # BLD AUTO: 283 10(3)UL (ref 150–450)
POTASSIUM SERPL-SCNC: 4.2 MMOL/L (ref 3.5–5.1)
PROT SERPL-MCNC: 7.6 G/DL (ref 5.7–8.2)
RBC # BLD AUTO: 5.26 X10(6)UL
SODIUM SERPL-SCNC: 138 MMOL/L (ref 136–145)
TRIGL SERPL-MCNC: 150 MG/DL (ref 30–149)
TSI SER-ACNC: 0.83 MIU/ML (ref 0.55–4.78)
VLDLC SERPL CALC-MCNC: 24 MG/DL (ref 0–30)
WBC # BLD AUTO: 7.7 X10(3) UL (ref 4–11)

## 2024-10-03 PROCEDURE — 3008F BODY MASS INDEX DOCD: CPT | Performed by: FAMILY MEDICINE

## 2024-10-03 PROCEDURE — 3074F SYST BP LT 130 MM HG: CPT | Performed by: FAMILY MEDICINE

## 2024-10-03 PROCEDURE — 90656 IIV3 VACC NO PRSV 0.5 ML IM: CPT | Performed by: FAMILY MEDICINE

## 2024-10-03 PROCEDURE — 80061 LIPID PANEL: CPT | Performed by: FAMILY MEDICINE

## 2024-10-03 PROCEDURE — 80050 GENERAL HEALTH PANEL: CPT | Performed by: FAMILY MEDICINE

## 2024-10-03 PROCEDURE — 83036 HEMOGLOBIN GLYCOSYLATED A1C: CPT | Performed by: FAMILY MEDICINE

## 2024-10-03 PROCEDURE — 90471 IMMUNIZATION ADMIN: CPT | Performed by: FAMILY MEDICINE

## 2024-10-03 PROCEDURE — 3079F DIAST BP 80-89 MM HG: CPT | Performed by: FAMILY MEDICINE

## 2024-10-03 PROCEDURE — 99396 PREV VISIT EST AGE 40-64: CPT | Performed by: FAMILY MEDICINE

## 2024-10-03 NOTE — PROGRESS NOTES
Chief Complaint   Patient presents with    Physical    Well Adult     Pt is fasting         HPI  Pt is here for wellness and is due to do PAP in December. She has no complaints and is following with cardiology. She has strong fam his of diabetes and wishes to be screened. Pt decleins breast exam and agrees to flu shot. Aware of risk benefit    ROS  As per HPI and all other systems reviewed and are negative      Past Medical History:    Abdominal distention    Abdominal pain    Acute diverticulitis    Acute medial meniscus tear of left knee    Formatting of this note might be different from the original.  Added automatically from request for surgery [id number]    Arthritis    Asthma (HCC)    allergy induced asthma    Autoimmune disease (HCC)    transverse myelitis     Back pain    Bloating    Body piercing    Constipation    Decorative tattoo    Diarrhea    Diarrhea, unspecified    Esophageal reflux    Essential hypertension    Fatigue    Flatulence/gas pain/belching    Food intolerance    Frequent urination    Gastroesophageal reflux disease without esophagitis    Heartburn    Heavy menses    High blood pressure    High cholesterol    no meds    History of adverse reaction to anesthesia    took longer to awaken and heart rate was a bit high    History of Clostridium difficile infection    7/2022    Hyperlipidemia    Indigestion    Irregular bowel habits    Leaking of urine    Medial epicondylitis of right elbow    Menses painful    Mild intermittent asthma without complication (HCC)    Muscle spasm    Nausea    Painful swallowing    PONV (postoperative nausea and vomiting)    Prediabetes    Problems with swallowing    PVC's (premature ventricular contractions)    Right lateral epicondylitis    Sleep disturbance    Splenic artery aneurysm (HCC)    recent diagnosis    Stool incontinence    Transverse myelitis (HCC)    has residual neurologic symptoms, sees neuro    Uncomfortable fullness after meals    Wears glasses        Past Surgical History:   Procedure Laterality Date    Cholecystectomy      Colonoscopy N/A 12/18/2023    Procedure: COLONOSCOPY;  Surgeon: Zafar Johnson DO;  Location:  ENDOSCOPY    [facility]      Foot fracture surgery  11/2017    no surgery    Knee arthroscopy      left knee    Knee surgery      Other surgical history  2018    right elbow surgery       Social History     Socioeconomic History    Marital status:    Tobacco Use    Smoking status: Never     Passive exposure: Never    Smokeless tobacco: Never   Vaping Use    Vaping status: Never Used   Substance and Sexual Activity    Alcohol use: Not Currently     Comment: social    Drug use: No       Family History   Problem Relation Age of Onset    Lipids Father     Other (Other) Father 86        myelodysplasia    Hypertension Mother     Other (Other) Maternal Uncle         maternal family hx of anurysm    Bleeding Disorders Maternal Grandfather     Other (Other) Brother         sounds like PAD in legs, had some complications from treatment    Diabetes Brother     Other (Other) Maternal Aunt         \"anuerysms in stomach\"    Bleeding Disorders Maternal Aunt         Aortic anerysum        Current Outpatient Medications on File Prior to Visit   Medication Sig Dispense Refill    dicyclomine 20 MG Oral Tab dicyclomine 20 MG Oral Tab, [RxNorm: 264175]      Omega-3 Fatty Acids (FISH OIL) 1200 MG Oral Cap Fish OiL 1,200 mg (144 mg-216 mg) capsule, [RxNorm: 0]      Rhubarb (ESTROVEN MENOPAUSE RELIEF) 4 MG Oral Tab Estroven Complete Menopause Relief 4 mg tablet, [RxNorm: 0]      triamcinolone 0.1 % External Cream APPLY EVERY OTHER DAY TO RASH      metoprolol succinate ER 50 MG Oral Tablet 24 Hr Take 1 tablet (50 mg total) by mouth daily. 90 tablet 0    ATORVASTATIN 20 MG Oral Tab TAKE 1 TABLET(20 MG) BY MOUTH EVERY NIGHT 90 tablet 0    albuterol 108 (90 Base) MCG/ACT Inhalation Aero Soln Inhale 1-2 puffs into the lungs every 4 (four) hours as needed for Wheezing  or Shortness of Breath. 1 each 0    Cholecalciferol (VITAMIN D) 50 MCG (2000 UT) Oral Tab Take by mouth.      Ascorbic Acid (VITAMIN C) 100 MG Oral Tab Take 1 tablet (100 mg total) by mouth daily.      Multiple Vitamins-Minerals (MULTI-VITAMIN/MINERALS) Oral Tab Take 1 tablet by mouth daily.      BACLOFEN 20 MG Oral Tab TAKE 1 TABLET BY MOUTH TWICE DAILY OR THREE TIMES DAILY (Patient taking differently: Take 1 tablet (20 mg total) by mouth 2 (two) times daily. TAKE 1 TABLET BY MOUTH TWICE DAILY OR THREE TIMES DAILY) 270 tablet 3     No current facility-administered medications on file prior to visit.         Objective  Vitals:    10/03/24 0904   BP: 114/82   Pulse: 84   Resp: 18   Temp: 97.1 °F (36.2 °C)   SpO2: 97%   Weight: 181 lb 6.4 oz (82.3 kg)   Height: 5' 4.17\" (1.63 m)     Physical Exam  Constitutional:       Appearance: Normal appearance.   HEENT:      Head: Normocephalic and atraumatic.      Eyes: PERRLA no notable nystagmus     Ears: normal on observation     Nose: Nose normal.      Mouth: Mucous membranes are moist.      Neck: no masses no bruit  Cardiovascular:      Rate and Rhythm: Normal rate and regular rhythm.   Pulmonary:      Effort: Pulmonary effort is normal.      Breath sounds: Normal breath sounds.   Abdominal:      General: Bowel sounds are normal.      Palpations: Abdomen is soft. There is no mass.   Musculoskeletal:         General: Normal range of motion.      Cervical back: Normal range of motion.   Skin:     General: Skin is warm and dry.   Neurological:      General: No focal deficit present.      Mental Status: She is alert and oriented to person, place, and time.   Psychiatric:         Mood and Affect: Mood normal.         Thought Content: Thought content normal.       Assessment and Plan  Candie was seen today for physical and well adult.    Diagnoses and all orders for this visit:    Well adult exam  -     CBC W Differential W Platelet [E]; Future  -     Comp Metabolic Panel (14)  [E]; Future  -     TSH W Reflex To Free T4 [E]; Future  -     Lipid Panel [E]; Future  -     [facility] 2D+3D SCREENING BILAT (CPT=77067/01912); Future    Flu vaccine need  -     Fluzone trivalent vaccine, PF 0.5mL, 6mo+ (33341)    Encounter for screening mammogram for malignant neoplasm of breast  -     [facility] 2D+3D SCREENING BILAT (CPT=77067/72696); Future    Post-menopausal  -     XR DEXA BONE DENSITOMETRY (CPT=77080); Future    Family history of diabetes mellitus  -     Hemoglobin A1C [E]; Future           Follow up  No follow-ups on file.      Patient Instructions  There are no Patient Instructions on file for this visit.       Roxanne Mckinnon MD       This note was created by Dragon voice recognition. Errors in content may be related to improper recognition by the system; efforts to review and correct have been done but errors may still exist. Please be advised the primary purpose of this note is for me to communicate medical care. Standard sentence structure is not always used. Medical terminology and medical abbreviations may be used. There may be grammatical, typographical, and automated fill ins that may have errors missed in proofreading.

## 2024-10-04 ENCOUNTER — TELEPHONE (OUTPATIENT)
Dept: FAMILY MEDICINE CLINIC | Facility: CLINIC | Age: 59
End: 2024-10-04

## 2024-10-04 NOTE — TELEPHONE ENCOUNTER
Please let patient know that her blood work indicates that she is diabetic and I would like her to follow-up to discuss management of this.  Thank you   Written by Roxanne Mckinnon MD on 10/4/2024  8:35 AM CDT  Seen by patient Candie Celi Liu on 10/4/2024 10:36 AM

## 2024-10-04 NOTE — TELEPHONE ENCOUNTER
Advised patient of Dr. Bingham's note below. Patient verbalized understanding an agreeable to schedule Video visit appt. No further questions at this time.    Future Appointments   Date Time Provider Department Center   10/7/2024 12:20 PM Roxanne Mckinnon MD [facility]   10/8/2024 11:15 AM Brenda Chapin DO [facility] [place]   10/29/2024 11:20 AM JOSIAS RODNEY 1 [facility]   11/5/2024 10:00 AM VIRGILIO UMANA 1 [facility]

## 2024-10-07 ENCOUNTER — TELEMEDICINE (OUTPATIENT)
Dept: FAMILY MEDICINE CLINIC | Facility: CLINIC | Age: 59
End: 2024-10-07
Payer: COMMERCIAL

## 2024-10-07 DIAGNOSIS — E11.9 TYPE 2 DIABETES MELLITUS WITHOUT COMPLICATION, WITHOUT LONG-TERM CURRENT USE OF INSULIN (HCC): ICD-10-CM

## 2024-10-07 DIAGNOSIS — E11.9 TYPE 2 DIABETES MELLITUS WITHOUT COMPLICATION, WITHOUT LONG-TERM CURRENT USE OF INSULIN (HCC): Primary | ICD-10-CM

## 2024-10-07 RX ORDER — METFORMIN HCL 500 MG
500 TABLET, EXTENDED RELEASE 24 HR ORAL DAILY
Qty: 30 TABLET | Refills: 0 | Status: SHIPPED | OUTPATIENT
Start: 2024-10-07

## 2024-10-07 NOTE — PROGRESS NOTES
Telehealth outside of [facility]  Telehealth Verbal Consent   I conducted a telehealth visit with Candie Liu today, 10/07/24, which was completed using two-way, real-time interactive audio and video communication. This has been done in good owen to provide continuity of care in the best interest of the provider-patient relationship, due to the COVID -19 public health crisis/national emergency where restrictions of face-to-face office visits are ongoing. Every conscious effort was taken to allow for sufficient and adequate time to complete the visit.  The patient was made aware of the limitations of the telehealth visit, including treatment limitations as no physical exam could be performed.  The patient was advised to call 911 or to go to the ER in case there was an emergency.  The patient was also advised of the potential privacy & security concerns related to the telehealth platform.   The patient was made aware of where to find [facility]'s notice of privacy practices, telehealth consent form and other related consent forms and documents.  which are located on the [facility] website. The patient verbally agreed to telehealth consent form, related consents and the risks discussed.    Lastly, the patient confirmed that they were in [place].   Included in this visit, time may have been spent reviewing labs, medications, radiology tests and decision making. Appropriate medical decision-making and tests are ordered as detailed in the plan of care above.  Coding/billing information is submitted for this visit based on complexity of care and/or time spent for the visit.    Candie Liu is a 59 year old female.    Chief Complaint   Patient presents with    Test Results       HPI:   Pt is here for follow up of labs and informed that she is diabetic and given her history of gestational diabetes and family history of diabetes she is not overly surprised but is agreeable to diabetic education and medication intervention.  Metformin discussed and pt provided test strips and advised to follow up in 3 months and sooner if she has any med SE      Patient Active Problem List   Diagnosis    Elevated triglycerides with high cholesterol    Allergic rhinitis    Mild intermittent asthma without complication (HCC)    Restless leg    Essential hypertension    Prediabetes    Aneurysm, splenic artery (HCC)    Agatston coronary artery calcium score between 200 and 399     Current Outpatient Medications   Medication Sig Dispense Refill    metFORMIN  MG Oral Tablet 24 Hr Take 1 tablet (500 mg total) by mouth daily. 30 tablet 0    dicyclomine 20 MG Oral Tab dicyclomine 20 MG Oral Tab, [RxNorm: 799684]      Omega-3 Fatty Acids (FISH OIL) 1200 MG Oral Cap Fish OiL 1,200 mg (144 mg-216 mg) capsule, [RxNorm: 0]      Rhubarb (ESTROVEN MENOPAUSE RELIEF) 4 MG Oral Tab Estroven Complete Menopause Relief 4 mg tablet, [RxNorm: 0]      triamcinolone 0.1 % External Cream APPLY EVERY OTHER DAY TO RASH      metoprolol succinate ER 50 MG Oral Tablet 24 Hr Take 1 tablet (50 mg total) by mouth daily. 90 tablet 0    ATORVASTATIN 20 MG Oral Tab TAKE 1 TABLET(20 MG) BY MOUTH EVERY NIGHT 90 tablet 0    albuterol 108 (90 Base) MCG/ACT Inhalation Aero Soln Inhale 1-2 puffs into the lungs every 4 (four) hours as needed for Wheezing or Shortness of Breath. 1 each 0    Cholecalciferol (VITAMIN D) 50 MCG (2000 UT) Oral Tab Take by mouth.      Ascorbic Acid (VITAMIN C) 100 MG Oral Tab Take 1 tablet (100 mg total) by mouth daily.      Multiple Vitamins-Minerals (MULTI-VITAMIN/MINERALS) Oral Tab Take 1 tablet by mouth daily.      BACLOFEN 20 MG Oral Tab TAKE 1 TABLET BY MOUTH TWICE DAILY OR THREE TIMES DAILY (Patient taking differently: Take 1 tablet (20 mg total) by mouth 2 (two) times daily. TAKE 1 TABLET BY MOUTH TWICE DAILY OR THREE TIMES DAILY) 270 tablet 3      Past Medical History:    Abdominal distention    Abdominal pain    Acute diverticulitis    Acute medial  meniscus tear of left knee    Formatting of this note might be different from the original.  Added automatically from request for surgery [id number]    Arthritis    Asthma (HCC)    allergy induced asthma    Autoimmune disease (HCC)    transverse myelitis     Back pain    Bloating    Body piercing    Constipation    Decorative tattoo    Diarrhea    Diarrhea, unspecified    Esophageal reflux    Essential hypertension    Fatigue    Flatulence/gas pain/belching    Food intolerance    Frequent urination    Gastroesophageal reflux disease without esophagitis    Heartburn    Heavy menses    High blood pressure    High cholesterol    no meds    History of adverse reaction to anesthesia    took longer to awaken and heart rate was a bit high    History of Clostridium difficile infection    7/2022    Hyperlipidemia    Indigestion    Irregular bowel habits    Leaking of urine    Medial epicondylitis of right elbow    Menses painful    Mild intermittent asthma without complication (HCC)    Muscle spasm    Nausea    Painful swallowing    PONV (postoperative nausea and vomiting)    Prediabetes    Problems with swallowing    PVC's (premature ventricular contractions)    Right lateral epicondylitis    Sleep disturbance    Splenic artery aneurysm (HCC)    recent diagnosis    Stool incontinence    Transverse myelitis (HCC)    has residual neurologic symptoms, sees neuro    Uncomfortable fullness after meals    Wears glasses      Social History:  Social History     Socioeconomic History    Marital status:    Tobacco Use    Smoking status: Never     Passive exposure: Never    Smokeless tobacco: Never   Vaping Use    Vaping status: Never Used   Substance and Sexual Activity    Alcohol use: Not Currently     Comment: social    Drug use: No     Social Determinants of Health      [facility]    Social Connections    Received from [facility], [facility]    Housing Stability     Family History   Problem Relation Age of Onset    Lipids Father     Other (Other) Father 86        myelodysplasia    Hypertension Mother     Other (Other) Maternal Uncle         maternal family hx of anurysm    Bleeding Disorders Maternal Grandfather     Other (Other) Brother         sounds like PAD in legs, had some complications from treatment    Diabetes Brother     Other (Other) Maternal Aunt         \"anuerysms in stomach\"    Bleeding Disorders Maternal Aunt         Aortic anerysum        Allergies  Allergies   Allergen Reactions    Cephalosporins HIVES    Pcn [Bicillin C-R,] HIVES    Penicillins HIVES    Lisinopril Coughing     Side effect        REVIEW OF SYSTEMS:   As per HPI all other systems are negative    EXAM:   GENERAL: well developed, well nourished,in no apparent distress. Pt is speaking in full sentences without any cognitive impairment. Further physical exam could not be performed due to interview being conducted over telemedicine medium      ASSESSMENT AND PLAN:     Encounter Diagnosis   Name Primary?    Type 2 diabetes mellitus without complication, without long-term current use of insulin (HCC) Yes       No orders of the defined types were placed in this encounter.      Meds & Refills for this Visit:  Requested Prescriptions     Signed Prescriptions Disp Refills    metFORMIN  MG Oral Tablet 24 Hr 30 tablet 0     Sig: Take 1 tablet (500 mg total) by mouth daily.       Imaging & Consults:  DIABETIC EDUCATION - INTERNAL  DME DIABETIC TEST STRIPS AND SUPPLIES    Return in about 3 months (around 1/7/2025) for follow up diabets.  There are no Patient Instructions on file for this visit.      This note was created by [employer] voice recognition. Errors in content may be related to improper recognition by the system; efforts to review and correct have been done but errors may still exist. Please be advised the primary purpose of this note is for me to  communicate medical care. Standard sentence structure is not always used. Medical terminology and medical abbreviations may be used. There may be grammatical, typographical, and automated fill ins that may have errors missed in proofreading.

## 2024-10-08 ENCOUNTER — OFFICE VISIT (OUTPATIENT)
Dept: RHEUMATOLOGY | Facility: CLINIC | Age: 59
End: 2024-10-08
Payer: COMMERCIAL

## 2024-10-08 ENCOUNTER — PATIENT MESSAGE (OUTPATIENT)
Dept: FAMILY MEDICINE CLINIC | Facility: CLINIC | Age: 59
End: 2024-10-08

## 2024-10-08 VITALS
DIASTOLIC BLOOD PRESSURE: 64 MMHG | WEIGHT: 179 LBS | RESPIRATION RATE: 16 BRPM | BODY MASS INDEX: 30.56 KG/M2 | SYSTOLIC BLOOD PRESSURE: 138 MMHG | OXYGEN SATURATION: 95 % | HEIGHT: 64 IN | TEMPERATURE: 98 F | HEART RATE: 66 BPM

## 2024-10-08 DIAGNOSIS — R79.82 ELEVATED C-REACTIVE PROTEIN (CRP): ICD-10-CM

## 2024-10-08 DIAGNOSIS — I10 ESSENTIAL HYPERTENSION: ICD-10-CM

## 2024-10-08 DIAGNOSIS — M25.50 POLYARTHRALGIA: Primary | ICD-10-CM

## 2024-10-08 DIAGNOSIS — M11.20 CHONDROCALCINOSIS: ICD-10-CM

## 2024-10-08 PROCEDURE — 99214 OFFICE O/P EST MOD 30 MIN: CPT | Performed by: INTERNAL MEDICINE

## 2024-10-08 PROCEDURE — 3078F DIAST BP <80 MM HG: CPT | Performed by: INTERNAL MEDICINE

## 2024-10-08 PROCEDURE — 3008F BODY MASS INDEX DOCD: CPT | Performed by: INTERNAL MEDICINE

## 2024-10-08 PROCEDURE — 3075F SYST BP GE 130 - 139MM HG: CPT | Performed by: INTERNAL MEDICINE

## 2024-10-08 RX ORDER — METOPROLOL SUCCINATE 50 MG/1
50 TABLET, EXTENDED RELEASE ORAL DAILY
Qty: 90 TABLET | Refills: 0 | Status: SHIPPED | OUTPATIENT
Start: 2024-10-08

## 2024-10-08 RX ORDER — METFORMIN HCL 500 MG
500 TABLET, EXTENDED RELEASE 24 HR ORAL DAILY
Qty: 90 TABLET | Refills: 0 | OUTPATIENT
Start: 2024-10-08

## 2024-10-08 NOTE — TELEPHONE ENCOUNTER
From: Candie Liu  To: Roxanne Mckinnon  Sent: 10/8/2024 9:55 AM CDT  Subject: Metoprolol    Good morning,     I guess the pharmacy is having problems filling the metoprolol. They sent you a request. Just wanted to let you know in case you hadn’t been notified.     Thank you!

## 2024-10-08 NOTE — PROGRESS NOTES
?  RHEUMATOLOGY FOLLOW UP   Date of visit: 10/08/2024  ?  Chief Complaint   Patient presents with    Follow - Up     6 month f/u. Feeling pretty good. Some back pain and seeing chiro. No other joint pain. Converted rapid score of 1.3     ?  ASSESSMENT, DISCUSSION & PLAN   Assessment:  1. Polyarthralgia    2. Chondrocalcinosis    3. Elevated C-reactive protein (CRP)          Discussion:  Ms. Candie Liu is a 58 yo woman with a complicated past medical history (transverse myelitis with some residual leg weakness and tingling; splenic artery aneurysm, prior c diff infection) who presented for evaluation of recurrent left knee pain/swelling. She had the knee aspirated in September and while no crystals seen, xrays showed chondrocalcinosis suggestive of CPPD arthritis.   Her autoimmune workup was grossly negative ([id number], MARY JO/BOYD, RF,CCP) with exception of persistent elevated CRP. Discussed that this is nonspecific and she should still be sure she is up to date with cancer screening and screening for cardiovascular disease.- she has plans for cscope later this year.     Previously, she tried 7 days total of colchicine without any improvement of symptoms. However, the last steroid taper really helped symptoms.  Last year, she underwent left knee arthroscopy and has been recovering slowly, starting to be more active.   Previously, she had right elbow pain without swelling but that radiated down into the index finger.  Some left elbow pain and significant left knee pain and swelling.  She was previously told there was chondrocalcinosis there and treated as if pseudogout flare.    She has a family history of psoriatic arthritis, so she is being monitored closely for this as well.   Since her last visit, she has been doing really well. Is now dealing with HTN, HLD and DM- about to start metformin and meet with dietician.   Discussed importance of regular exercise too.  Will repeat her ESR/CRP with next set of  labs.  Although had prior photosensitivity, no recent issues.   Will continue monitor her   Will continue to monitor without immunosuppression.  Okay to follow up in about 8 months (due to my schedule) but encouraged her to reach out in the meantime with an update if symptoms change/worsen.     Patient verbalized understanding of above instructions. No further questions at this time.    Code selection for this visit was based on time spent (30min) on date of service in preparing to see the patient, obtaining and/or reviewing separately obtained history, performing a medically appropriate examination, counseling and educating the patient/family/caregiver, ordering medications or testing, referring and communicating with other healthcare providers, documenting clinical information in the E HR, independently interpreting results and communicating results to the patient/family/caregiver and care coordination with the patient's other providers.    ?  Plan:  Diagnoses and all orders for this visit:    Polyarthralgia  -     C-Reactive Protein; Future  -     Sed Rate, Westergren (Automated); Future    Chondrocalcinosis  -     C-Reactive Protein; Future  -     Sed Rate, Westergren (Automated); Future    Elevated C-reactive protein (CRP)  -     C-Reactive Protein; Future  -     Sed Rate, Westergren (Automated); Future        Return in about 8 months (around 6/8/2025).  ?  HPI   Candie Liu is a 59 year old adult with the following active problems who was seen initially for evaluation of joint pain and elevated CRP. She presents for follow up today    Since her last visit, she has been doing well.  Recently aggravated her lower back by lifting her dog. Had difficulty even bending. Now following with chiropractor and feels better. Still with pain but is improving with adjustments and stretching. Is still going 3x/week.     Went through cardiac work up-  saw second opinion through [facility]. Had stress test and ECHO- told  grossly negative  Seen by PCP and had annual visit. Had elevations in lipids- increased statin and had more elevated numbers. Repeated and numbers improved. BP better controlled overall.   Had elevations in her blood sugars/A1c and weight gain this summer despite being more active. Now type II DM. Just had metformin ordered but hasn't started yet ?pharmacy issues.   Has plans to see dietician/DM counselor    Suffered diverticulitis attack for the first time in July- does have hx of diverticulosis. Thinks triggered by popcorn. Has to reach out to GI.     Joint wise, was doing well over the summer. Has slight increased pain lately in the knees. Some tightness in the hands.  Has not had recurrence of sun induced rash.   Still has chester's under bra  Denies oral or nasal ulcers   + dry eyes, attributes to prior lasik sx and allergies. Uses refresh and xaditor.   + increased thirst   + increased nocturia     Was seen by cardiologist (had HTN, abnormal calcium score and started on statin by PCP)- told okay to follow up prn. Does feel like she is getting sun sensitivity with the metoprolol. Having worsened itchy bumps on arms/legs. Was also on antibiotc at that time.   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 Has appt in July HPI from initial consultation  referred for rheumatologic evaluation due to joint pain.     Has had a total of 3 separate occasions of left knee pain and swelling.   In July, had symptoms but xrays were negative was given NSAID and rest and she felt better.   In September, she had another bout but the NSAIDs and rest were insufficient. Initially went to immediate care and since she had elevated WBC, recommended ER visit. While in the ER, had it drained and discharged home. Was seen by orthopedic and recommended rheumatology evaluation.     Since September, she has been doing well overall until the past two days. She is starting to get some  stiffness and swelling.   Denies any prior infections leading up to the flares.   Denies injury but admits to walking more prior to the Sept flare.     Does get elbow pain, would previously get recurrent right elbow tendonitis. 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 Did have surgery in May 2019 and noted to have some fraying. Since that time, pain is better overall.  Can get some left elbow pain.  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  Can get some right hip pain that can bother her at night, needing to switch sides  Can get some tightness in her fingers and stiffness without swelling.     + hx of transverse myelitis- 3 months after birth of daughter in 1997, her right leg had some tingling and sensation of leg being asleep and also had some low back pain. Then progressed where she couldn't walk and developed paralysis from breast down. Could not figure out cause of TM. Unclear if connected to epidural for delivery. Still suffers from chronic leg cramping and tingling. Also feels weakness in the legs. Follows with neurology yearly.   + hx of splenic artery aneurysm found incidentally when getting worked up for bowel issues- seen on abdominal plain films. Follows with vascular surgeon.   + hx of c diff infection, unclear trigger but symptoms have improved     + reflux/GERD previously on omeprazole. Can get sensation of food getting stuck. Takes pepcid prn but takes TUMS more  + hx of IBS- can alternate constipation/diarrhea worsened with stress. Never blood in stools. Prior mucous subsided.   + hx of yearly bronchitis/sinus infection   + currently going through menopause and fibroids along with continued menstrual periods despite hormone replacement. Follows with gyne for pelvic US.   + ace induced cough, improved since stopping lisinopril   + hx of fatty liver per imaging and pt does have elevated cholesterol   + dry eyes post-lasic and allergies   + plantar fasciitis, hx of traumatic fall and  fx of the left heel requiring boot. Follows with podiatry- uses inserts/orthotics   + daily headaches, minor, thinks related to new BP meds    No history of significant morning stiffness or new skin nodule formation.  The patient denies hair loss, oral or nasal ulcers, photosensitive rash, elevated or scarring rashes, Raynaud's phenomenon, prior renal disease, or history of seizures.  No history of prior blood clot in the legs or lungs, strokes or ischemic phenomenon  Denies nonhealing ulcers on the fingertips.   The patient denies any history of uveitis, nodular painful shin bruises, Achilles heel pain, psoriatic lesions, spooning or pitting of the nails, or history of dactylitis.  There are no symptoms of severe dry mouth, recurrent cavities, or swelling of the cheeks or under the jawbone.   No fevers, chills, lymphadenopathy, night sweats, unexpected weight loss, easy bruising or bleeding.  Denies epistaxis.  Denies chronic cough or hemoptysis.     Family hx:  Multiple maternal family hx of aneurysms (most aortic)         Past Medical History:  Past Medical History:    Abdominal distention    Abdominal pain    Acute diverticulitis    Acute medial meniscus tear of left knee    Formatting of this note might be different from the original.  Added automatically from request for surgery [id number]    Arthritis    Asthma (HCC)    allergy induced asthma    Autoimmune disease (HCC)    transverse myelitis     Back pain    Bloating    Body piercing    Constipation    Decorative tattoo    Diabetes (HCC)    Diarrhea    Diarrhea, unspecified    Esophageal reflux    Essential hypertension    Fatigue    Flatulence/gas pain/belching    Food intolerance    Frequent urination    Gastroesophageal reflux disease without esophagitis    Heartburn    Heavy menses    High blood pressure    High cholesterol    no meds    History of adverse reaction to anesthesia    took longer to awaken and heart rate was a bit high    History of  Clostridium difficile infection    7/2022    Hyperlipidemia    Indigestion    Irregular bowel habits    Leaking of urine    Medial epicondylitis of right elbow    Menses painful    Mild intermittent asthma without complication (HCC)    Muscle spasm    Nausea    Painful swallowing    PONV (postoperative nausea and vomiting)    Prediabetes    Problems with swallowing    PVC's (premature ventricular contractions)    Right lateral epicondylitis    Sleep disturbance    Splenic artery aneurysm (HCC)    recent diagnosis    Stool incontinence    Transverse myelitis (HCC)    has residual neurologic symptoms, sees neuro    Uncomfortable fullness after meals    Wears glasses     Past Surgical History:  Past Surgical History:   Procedure Laterality Date    Cholecystectomy      Colonoscopy N/A 12/18/2023    Procedure: COLONOSCOPY;  Surgeon: Zafar Johnson DO;  Location:  ENDOSCOPY    [facility]      Foot fracture surgery  11/2017    no surgery    Knee arthroscopy      left knee    Knee surgery      Other surgical history  2018    right elbow surgery     Family History:  Family History   Problem Relation Age of Onset    Lipids Father     Other (Other) Father 86        myelodysplasia    Hypertension Mother     Other (Other) Maternal Uncle         maternal family hx of anurysm    Bleeding Disorders Maternal Grandfather     Other (Other) Brother         sounds like PAD in legs, had some complications from treatment    Diabetes Brother     Other (Other) Maternal Aunt         \"anuerysms in stomach\"    Bleeding Disorders Maternal Aunt         Aortic anerysum     Social History:  Social History     Socioeconomic History    Marital status:    Tobacco Use    Smoking status: Never     Passive exposure: Never    Smokeless tobacco: Never   Vaping Use    Vaping status: Never Used   Substance and Sexual Activity    Alcohol use: Not Currently     Comment: social    Drug use: No     Social Determinants of Health      Received from [facility], [facility]    Social Connections    Received from [facility], [facility]    Housing Stability     Medications:  Outpatient Medications Marked as Taking for the 10/8/24 encounter (Office Visit) with Brenda Chapin DO   Medication Sig Dispense Refill    dicyclomine 20 MG Oral Tab dicyclomine 20 MG Oral Tab, [RxNorm: 164157]      Omega-3 Fatty Acids (FISH OIL) 1200 MG Oral Cap Fish OiL 1,200 mg (144 mg-216 mg) capsule, [RxNorm: 0]      Rhubarb (ESTROVEN MENOPAUSE RELIEF) 4 MG Oral Tab Estroven Complete Menopause Relief 4 mg tablet, [RxNorm: 0]      triamcinolone 0.1 % External Cream APPLY EVERY OTHER DAY TO RASH      [DISCONTINUED] metoprolol succinate ER 50 MG Oral Tablet 24 Hr Take 1 tablet (50 mg total) by mouth daily. 90 tablet 0    ATORVASTATIN 20 MG Oral Tab TAKE 1 TABLET(20 MG) BY MOUTH EVERY NIGHT 90 tablet 0    albuterol 108 (90 Base) MCG/ACT Inhalation Aero Soln Inhale 1-2 puffs into the lungs every 4 (four) hours as needed for Wheezing or Shortness of Breath. 1 each 0    Cholecalciferol (VITAMIN D) 50 MCG (2000 UT) Oral Tab Take by mouth.      Ascorbic Acid (VITAMIN C) 100 MG Oral Tab Take 1 tablet (100 mg total) by mouth daily.      Multiple Vitamins-Minerals (MULTI-VITAMIN/MINERALS) Oral Tab Take 1 tablet by mouth daily.      BACLOFEN 20 MG Oral Tab TAKE 1 TABLET BY MOUTH TWICE DAILY OR THREE TIMES DAILY (Patient taking differently: Take 1 tablet (20 mg total) by mouth 2 (two) times daily. TAKE 1 TABLET BY MOUTH TWICE DAILY OR THREE TIMES DAILY) 270 tablet 3     Modified Medications    Modified Medication Previous Medication    METOPROLOL SUCCINATE ER 50 MG ORAL TABLET 24 HR metoprolol succinate ER 50 MG Oral Tablet 24 Hr       Take 1 tablet (50 mg total) by mouth daily.    Take 1 tablet (50 mg total) by mouth daily.     There are no discontinued medications.    ?  ?  Allergies:  Allergies   Allergen Reactions     Cephalosporins HIVES    Pcn [Bicillin C-R,] HIVES    Penicillins HIVES    Lisinopril Coughing     Side effect      ?  REVIEW OF SYSTEMS   ?  Review of Systems   Constitutional:  Positive for malaise/fatigue. Negative for chills, fever and weight loss.   Eyes:  Negative for pain and redness.   Respiratory:  Negative for cough, shortness of breath and wheezing (with asthma/bronchitis).    Cardiovascular:  Negative for chest pain, palpitations and leg swelling.   Gastrointestinal:  Positive for abdominal pain, constipation, diarrhea and heartburn. Negative for blood in stool and nausea.   Genitourinary:  Positive for frequency and urgency. Negative for dysuria and hematuria.   Musculoskeletal:  Negative for back pain, joint pain, myalgias and neck pain.   Skin:  Negative for itching and rash.   Neurological:  Negative for dizziness, tingling, weakness and headaches.   Endo/Heme/Allergies:  Positive for environmental allergies. Bruises/bleeds easily.     PHYSICAL EXAM   Today's Vitals:  Temperature Blood Pressure Heart Rate Resp Rate SpO2   Temp: 97.7 °F (36.5 °C) BP: 138/64 Pulse: 66 Resp: 16 SpO2: 95 %   ?  Current Weight Height BMI BSA Pain   Wt Readings from Last 1 Encounters:   10/08/24 179 lb (81.2 kg)    Height: 5' 4\" (162.6 cm) Body mass index is 30.73 kg/m². Body surface area is 1.87 meters squared.         Physical Exam  Vitals and nursing note reviewed.   Constitutional:       General: She is not in acute distress.     Appearance: Normal appearance. She is well-developed. She is not diaphoretic.   HENT:      Head: Normocephalic.   Eyes:      General: No scleral icterus.     Extraocular Movements: Extraocular movements intact.      Conjunctiva/sclera: Conjunctivae normal.   Neck:      Vascular: No JVD.      Trachea: No tracheal deviation.   Cardiovascular:      Rate and Rhythm: Normal rate and regular rhythm.      Heart sounds: Normal heart sounds. No murmur heard.  Pulmonary:      Effort: Pulmonary effort is  normal. No respiratory distress.      Breath sounds: Normal breath sounds. No wheezing.   Musculoskeletal:         General: No swelling, tenderness or deformity.      Cervical back: Neck supple.      Comments: Early oa changes of fingers without synovitis or tenderness   Tenderness across pips. - improved   No swelling, tenderness, redness or restriction of motion of the DIPs, MCPs, L wrist, L elbow, ankles, or joints of the feet.  Bilateral shoulders with full ROM, no evidence of impingement with provocative maneuvers.  Left knee post surgical, no longer tender, swollen or warm to touch.   Right knee benign.  Some right wrist and elbow tenderness without swelling. - resolved    Lymphadenopathy:      Cervical: No cervical adenopathy.   Skin:     General: Skin is warm and dry.      Findings: No erythema or rash.      Comments: No periungal erythema or digital pits   Neurological:      Mental Status: She is alert and oriented to person, place, and time.      Cranial Nerves: No cranial nerve deficit.      Gait: Gait normal.   Psychiatric:         Mood and Affect: Mood normal.         Behavior: Behavior normal.       ?  Radiology review:     MR LEFT KNEE     HISTORY: Knee pain.     COMPARISON: MRI left knee 9/27/2022     TECHNIQUE: Multiplanar, multisequence MR imaging of the left knee was obtained without intravenous contrast.     FINDINGS:     LIGAMENTS:   The cruciate ligaments appear intact.   Low-grade thickening of the tibial collateral ligament with mild adjacent edema may be compatible with a type I low-grade sprain.   The lateral collateral ligament appears intact.     MENISCI AND FEMORAL-TIBIAL HYALINE CARTILAGE:   Large radial tear of the posterior horn medial meniscus adjacent to the root.   Moderate meniscal degenerative signal at the posterior and middle thirds.   4 mm medial meniscal extrusion.   The lateral meniscus appears intact.   There is mild tibiofemoral chondromalacia.     PATELLOFEMORAL JOINT AND  EXTENSOR MECHANISM:   Extensor mechanism appears intact.   Patellofemoral chondromalacia is again demonstrated with 8mm full-thickness chondral defect of the upper median ridge and adjacent subcortical cyst formation.   Patellar fat pads intact.      OSSEOUS/BONE MARROW:   Mild marrow edema along the medial tibiofemoral joint line.     GENERAL:   Small joint effusion.   Probable low-grade popliteus muscle strain.   IMPRESSION:     Interval large radial tear of the posterior horn medial meniscus with adjacent meniscal degeneration and mild meniscal extrusion.     Interval grade 1 medial collateral ligament sprain.     Stable patellar chondromalacia with full-thickness defect of the superior patellar median ridge.     Probable low-grade popliteus muscle strain.     Small knee joint effusion.     FINAL REPORT   Attending Radiologist:  Syed Carranza MD   Date Signed Off:  04/12/2023 11:03       Exam: MRI PELVIS W/O CONTRAST   CPT Code(s): 79421 - MRI PELVIS W/O DYE     MRI SACROILIAC JOINTS     HISTORY: Sacroiliitis, not elsewhere classified     COMPARISON: None currently available.     TECHNICAL: Routine MRI exam performed on a wide bore ultra high field 3.0 T Siemens Skyra MR scanner, without intravenous contrast.     FINDINGS:   Small marginal osteophytes at the right sacroiliac joint. Joint spaces are maintained bilaterally. No subchondral reactive marrow change or cyst formation.     Visualized lower lumbar spine demonstrates mild disc degeneration at the L4-5 and L5-S1 level with no significant central canal stenosis or neural foraminal narrowing. Sacrum and coccyx are intact. No fracture or other abnormal marrow signal. Bilateral   symmetric normal-appearing common origin of hamstrings.     IMPRESSION:   1. Small marginal osteophytes in the right SI joint. The SI joints are otherwise normal in appearance. No evidence for sacroiliitis.     Interpreting Radiologist:     Pierre Augustin M.D.   Electronically Signed:  02/07/2023 03:31 PM    PROCEDURE:  XR SACROILIAC JOINTS (MIN 3 VIEWS) (CPT=72202)       LOCATION:  [facility]         INDICATIONS:  G89.29 Chronic SI joint pain M53.3 Chronic SI joint pain M19.90 Inflammatory arthritis Z84.0 Family history of psoriasis in mother       COMPARISON:  None.       TECHNIQUE:  Frontal and oblique of the sacroiliac joints were obtained.       PATIENT STATED HISTORY: (As transcribed by Technologist)  Pt c/o chronic R sided SI joint pain and tightness into the hamstring.  No known injury.  No prev fx or surgery.            FINDINGS:  No acute fracture or dislocation is seen.  Very minimal osteophyte formation noted on the right along the inferior aspect.  Minimal sclerosis is present surrounding the sacroiliac joints.  If clinical symptoms persist then consider follow-up   imaging or MRI.                        Impression   CONCLUSION:  See above.           Dictated by (CST): Elfego Catalan MD on 2/03/2023 at 11:49 AM       Finalized by (CST): Elfego Catalan MD on 2/03/2023 at 11:50 AM      PROCEDURE:  XR LUMBAR SPINE (MIN 4 VIEWS) (CPT=72110)       LOCATION:  [facility]       TECHNIQUE:  AP, lateral, oblique, and coned down L5-S1 views were obtained.       COMPARISON:  None.       INDICATIONS:  G89.29 Chronic SI joint pain M53.3 Chronic SI joint pain M19.90 Inflammatory arthritis Z84.0 Family history of psoriasis in mother       PATIENT STATED HISTORY: (As transcribed by Technologist)  Pt c/o chronic R sided SI joint pain and tightness into the hamstring.  No known injury.  No prev fx or surgery.            FINDINGS:       There is normal lumbar lordosis.  No significant spondylolisthesis is present.  Vertebral bodies appear maintained in height.  Moderate facet arthropathy favored from L4 through S1.  Mild ventral osteophyte formation present from L3 through L5.  If   clinical symptoms persist then consider MRI.       Incidentally noted probable left renal stones measuring up to 1.8 x 2.1 cm.                         Impression   CONCLUSION:  See above.           Dictated by (CST): Elfego Catalan MD on 2/03/2023 at 11:42 AM       Finalized by (CST): Elfego Catalan MD on 2/03/2023 at 11:43 AM          MR LEFT KNEE     HISTORY: Left knee pain and swelling.  Evaluate for meniscus tear or loose body.  Possible inflammatory arthropathy.  No specific injury or trauma.     COMPARISON: Correlation with radiographs, 9/20/2022     TECHNIQUE: Multiplanar, multisequence MR imaging of the left knee was obtained without intravenous contrast.     FINDINGS:     LIGAMENTS:   ACL: Intact   PCL: Intact   MCL: Intact   LCL complex: Intact     MENISCI AND FEMORAL-TIBIAL HYALINE CARTILAGE:   Medial meniscus: No tear identified.   Lateral meniscus: No tear identified.     Medial compartment articular cartilage: Grade 1-2 chondromalacia weightbearing articular cartilage.  Suspect more focal subtle moderate to high-grade fissuring involving the inner central weightbearing medial femoral condyle with subtle adjacent subchondral marrow edema (series 4, image [id number]).   Lateral compartment articular cartilage: Grade 1-2 chondromalacia weightbearing articular cartilage.  No focal chondral defect identified.     PATELLOFEMORAL JOINT AND EXTENSOR MECHANISM:   High-grade patellar chondromalacia preferentially involving the medial patellar facet and median ridge.  Patellar subchondral cystic change and mild reactive marrow edema.  Mild surface irregularity and fraying of the trochlear articular cartilage.  Quadriceps and patellar tendons appear intact.     OSSEOUS/BONE MARROW:   No acute or healing fracture identified.  Patellar subchondral reactive signal changes as above.  No erosions or periarticular reactive marrow edema.  Small marginal joint compartment osteophytes.     GENERAL:   Trace nonexpansile joint effusion.  Distention of a tiny slitlike Baker's cyst.  No discrete intra-articular bodies identified.       IMPRESSION:   No meniscus tear identified.      Intact ligaments.     High-grade patellar chondromalacia preferentially involving the medial facet and median ridge.     Trace nonexpansile effusion.     No erosions or periarticular reactive marrow edema identified.     No intra-articular body identified.     FINAL REPORT   Attending Radiologist:  Martell Irene MD   Date Signed Off:  09/27/2022 13:59    PROCEDURE:  CTA ABD (CPT=74175)       LOCATION:  [place]         COMPARISON:  [facility], CT, CT ABD   PEL W/CONT, 12/06/2012, 9:15 AM.       INDICATIONS:  I72.8 Splenic artery aneurysm (HCC) R93.3 Abnormal finding on GI tract imaging       TECHNIQUE:   Contrast-enhanced multislice CT angiography of the abdominal aorta is performed using nonionic contrast.  Multiplanar 3D reconstructions are generated.  Dose reduction techniques were used. Dose information is transmitted to the ACR   (American College of Radiology) NRDR (National Radiology Data Registry) which includes the Dose Index Registry.       PATIENT STATED HISTORY:(As transcribed by Technologist)  Patient had a change in bowel habits and abnormal findings on a recent xray abdomen.        CONTRAST USED:  100cc of Omnipaque 350       FINDINGS:     LUNG BASES:  Stable.  No consolidation or pleural effusion.   LIVER:  Stable.  Diffuse low attenuation consistent with fatty infiltration.   BILIARY:  Stable.  Surgically absent gallbladder.  No biliary dilatation.   PANCREAS:  Stable.  Uniform parenchyma.  No ductal dilatation.   SPLEEN:  Stable.  Not enlarged.   KIDNEYS:  Stable.  Normal anatomic positions.  No hydronephrosis or perinephric stranding.  Uniform parenchymal enhancement.   ADRENALS:  Stable.  Not enlarged.   AORTA/VASCULAR:  There is smooth tapering of the abdominal aorta.  No abdominal aortic aneurysm.  Patent celiac artery, SMA and STEPHANIE.  Single renal arteries bilaterally.  Typical position of the left renal vein, anterior to the abdominal aorta.     There is an oblong saccular  aneurysm of the distal splenic artery, adjacent to the splenic hilum.  It is heavily calcified.  Diameter is measured at 1.2 cm.  Progressive calcification compared to the prior.  Diameter remeasured on the prior, non CTA,   study at 1.1 cm.   RETROPERITONEUM:  Stable.  No adenopathy.   BOWEL/MESENTERY:  Stable.  Normal bowel caliber.  No colonic inflammation.  Moderate scattered stool.  No ascites.   ABDOMINAL WALL:  Stable.  Umbilical eventration.   BONES:  Stable.  Mild degenerative changes.   OTHER:  None.                            Impression   CONCLUSION:     1. Stable size distal splenic artery aneurysm with progressive calcification.   2. Hepatic steatosis.   3. Details as above.                    Dictated by (CST): Juan Carlos Wayne MD on 7/27/2022 at 7:56 AM       Finalized by (CST): Juan Carlos Wayne MD on 7/27/2022 at 8:50 AM       PROCEDURE:  XR KNEE ROUTINE (3 VIEWS), LEFT (CPT=73562)       TECHNIQUE:  Three views were obtained including patellar view.       COMPARISON:  None.       INDICATIONS:       PATIENT STATED HISTORY: (As transcribed by Technologist)  Pt. has left knee pain x 2 weeks.  Pt. feels her knee may be filled with fluid due to pain and swelling.  Pain medially and proximal to patella.             FINDINGS:  Joint spaces are normal.  Minimal patella spurs.  There is mild chondrocalcinosis.  No fracture, expansile lesion or erosion.  No joint effusion is suggested.        Impression   CONCLUSION:     1. Minimal patella spurs.   2. Mild chondrocalcinosis.       Dictated by (CST): Juan Carlos Wayne MD on 2/17/2022 at 5:07 PM       Finalized by (CST): Juan Carlos Wayne MD on 2/17/2022 at 5:08 PM      Labs:  Lab Results   Component Value Date    WBC 7.7 10/03/2024    RBC 5.26 10/03/2024    HGB 15.5 10/03/2024    HCT 45.2 10/03/2024    .0 10/03/2024    MCV 85.9 10/03/2024    MCH 29.5 10/03/2024    MCHC 34.3 10/03/2024    RDW 12.6 10/03/2024    NEPRELIM 4.92 10/03/2024    NEPERCENT 63.7 10/03/2024    LYPERCENT  20.5 10/03/2024    MOPERCENT 11.3 10/03/2024    EOPERCENT 3.4 10/03/2024    BAPERCENT 0.8 10/03/2024    NE 4.92 10/03/2024    LYMABS 1.58 10/03/2024    MOABSO 0.87 10/03/2024    EOABSO 0.26 10/03/2024    BAABSO 0.06 10/03/2024     Lab Results   Component Value Date     (H) 10/03/2024    BUN 11 10/03/2024    BUNCREA 14.9 04/19/2021    CREATSERUM 0.72 10/03/2024    ANIONGAP 3 10/03/2024     07/13/2017    GFRNAA 98 07/26/2022    GFRAA 113 07/26/2022    CA 10.3 10/03/2024    OSMOCALC 286 10/03/2024    ALKPHO 109 10/03/2024    AST 28 10/03/2024    ALT 42 10/03/2024    BILT 0.5 10/03/2024    TP 7.6 10/03/2024    ALB 4.5 10/03/2024    GLOBULIN 3.1 10/03/2024     10/03/2024    K 4.2 10/03/2024     10/03/2024    CO2 27.0 10/03/2024       Additional Labs:  01/2024  ESR 18 normal  CRP 0.54 borderline    08/2023  ESR 19 normal  CRP 0.64 borderline  B12 473 normal  Vit D 30.9 borderline     04/2023  ANCA, MPO, PR3 negative   ESR 21 normal  CRP 1.03 elevated     02/2023  HLA-B27 negative  IgA, IgG, IgM normal  CMP grossly normal with exception of elevated blood glucose  CBC grossly normal  MARY JO by IFA negative  CCP negative  RF negative  ESR 17 normal  CRP 1.05 elevated    09/2022  Synovial fluid analysis no birefrigment crystals; urine culture negative  Nucleated body cells 34, 445  CBC with WBC 12.3; hemoglobin 14.7; platelet 292  CMP grossly normal  ESR 27 borderline elevated  CRP 44.1 elevated (N<10)  Uric acid 4.9    Brenda Chapin,   [facility]  10/08/2024

## 2024-10-09 RX ORDER — ATORVASTATIN CALCIUM 20 MG/1
20 TABLET, FILM COATED ORAL NIGHTLY
Qty: 90 TABLET | Refills: 0 | Status: SHIPPED | OUTPATIENT
Start: 2024-10-09

## 2024-10-09 NOTE — TELEPHONE ENCOUNTER
Cholesterol Medication Protocol Wy[place]15/09/2024 11:29 AM   Protocol Details ALT < 80    ALT resulted within past year    Lipid panel within past 12 months    In person appointment or virtual visit in the past 12 mos or appointment in next 3 mos   Routing to provider per protocol.   ATORVASTATIN 20 MG Oral Tab   Last refilled on 7/19/24 for #90  with 0 rf.   Last labs 10/3/24.   Last seen on 10/3/24.       Future Appointments   Date Time Provider Department Center   10/15/2024  1:45 PM Kerry Robertson RD [facility] DIABETES [facility]   10/29/2024 11:20 AM YK RONEL 1 [facility] [place]   11/5/2024 10:00 AM BK DEXA 1 [facility] DEXA [facility]   6/11/2025 11:15 AM Brenda Chapin DO [facility] [place]          Thank you.

## 2024-10-15 ENCOUNTER — HOSPITAL ENCOUNTER (OUTPATIENT)
Dept: ENDOCRINOLOGY | Facility: HOSPITAL | Age: 59
Discharge: HOME | End: 2024-10-15
Attending: FAMILY MEDICINE
Payer: COMMERCIAL

## 2024-10-15 ENCOUNTER — TELEPHONE (OUTPATIENT)
Dept: FAMILY MEDICINE CLINIC | Facility: CLINIC | Age: 59
End: 2024-10-15

## 2024-10-15 VITALS — WEIGHT: 179.31 LBS | BODY MASS INDEX: 31 KG/M2

## 2024-10-15 DIAGNOSIS — E11.9 TYPE 2 DIABETES MELLITUS WITHOUT COMPLICATION, WITHOUT LONG-TERM CURRENT USE OF INSULIN (HCC): Primary | ICD-10-CM

## 2024-10-15 NOTE — PROGRESS NOTES
Candie Liu : 3/2/1965  attended individual initial assessment for Diabetes Education:    Date: 10/15/2024         Start time: 1:45 End time: 2:50    HgbA1C (%)   Date Value   10/03/2024 6.6 (H)       Wt Readings from Last 1 Encounters:   10/15/24 179 lb 4.8 oz       Assessment: Pt is newly diagnosed with diabetes 2 weeks ago. Hx of GDM x 2. Family hx of DM (grandmother, older brother). Started metformin 2 weeks ago. Pt also with c/o IBS, GERD. GI discomfort at night since started on metformin. Takes metformin in the morning with meal. Pt enrolled for exercise classes last week and is set to go twice a week. Pt has not received her BG meter yet and unsure if Rx was sent out. Pt to check with her MD on this.   Pt used to be a medical assistant in the past, not currently working. Per pt she watches her grandkids.      Diet Hx: pt has days when she eats more consistent meals and other days she skips meals as she is not hungry. Pt does not consume sweets as much but does recognize sugary drinks as her weakness (Iced tea/coffee). However, since DM dx pt states she has cut back on it.     Education provided on the below topics:     Diabetes Overview:  Pathophysiology, A1C results and treatment options for diabetes self-management reviewed.   Described basic process and treatment options for diabetes self-management.  Introduced long term impact of uncontrolled blood glucose on health.    Healthy Eating:  Obtained usual diet history.  Instructed on Basic Diet Guidelines which includes eating 3 meals/day. Eat moderate amounts at consistent times from day to day. Limit/avoid sweets. Instructed on Balanced Plate Method of meal planning. Instructed to limit grains or starchy vegetables to ¼ of plate, protein to ¼ of plate, non-starchy vegetables to ½ plate and modest portions of fruit, yogurt, milk as desired.    Being Active:   Encouraged Physical Activity and briefly reviewed ADA recommended guidelines for  activity with type 2 diabetes.    Taking Medications:   Reviewed current diabetes medications (metformin).    Monitoring:  Instructed/reinforced blood glucose monitoring, testing schedules and target goals:   Fasting / Pre-meal  mg/dL 2 Hour Post-prandial <180 mg/dL  Demonstrated ability to perform blood glucose testing.     Problem Solving:   Prevention, detection and treatment of acute complications: taught symptoms of hypoglycemia, hyperglycemia, how to treat low blood sugar (Rule of 15) and actions for lowering high blood glucose levels.     Behavior Change:  Initiated individualized goal setting process and will continue to refine throughout class series.    Recommendations:      Monitor blood glucose as directed.  Follow Balance Plate method of meal planning.   Attend all Group Class in series       Written materials provided for all areas covered.  Patient verbalized understanding and all questions answered.    Melva Irene RDN, CDCES

## 2024-10-15 NOTE — PATIENT INSTRUCTIONS
Goals       1.  EDC - Healthy (pt-stated)       Note created  10/15/2024  3:32 PM by Melva Irene RD      - I will eat 3 meals daily  - I will continue to watch intake of sugary drinks        2.  EDC - Monitoring (pt-stated)       Note created  10/15/2024  3:32 PM by Melva Irene RD      I will check Blood glucose once a day

## 2024-10-15 NOTE — TELEPHONE ENCOUNTER
[id number] - [place], [place] - [address] AT [facility], [phone], [phone]   [address] [facility] [address]   Phone: [phone] Fax: [phone]   Hours: Not open 24 hours       PATIENT CALLING THIS AFTERNOON.  SHE HAD HER CONSULTATION WITH DIABETIC NURSE.  PATIENT SAYS NO RX WAS SENT FOR ANY DIABETIC TESTING.  NOT SURE IF DR SAMUELS OR DIABETIC EDUCATOR WILL BE HANDLING THE INSULIN AND TEST STRIPS

## 2024-10-16 NOTE — TELEPHONE ENCOUNTER
Per Dr. Bingham: Rx for supplies sent     Printed Rx for diabetic test strips and supplies - faxed to [facility] Fax: [phone], Rx was not electronically sent to pharmacy    Advised patient of notes above. Patient verbalized understanding. Advised pt to contact pharmacy by tomorrow regarding Rx order - she v/u. No further questions at this time.

## 2024-10-17 RX ORDER — BLOOD SUGAR DIAGNOSTIC
STRIP MISCELLANEOUS
Qty: 125 EACH | Refills: 1 | Status: SHIPPED | OUTPATIENT
Start: 2024-10-17

## 2024-10-17 RX ORDER — LANCETS
EACH MISCELLANEOUS
Qty: 108 EACH | Refills: 1 | Status: SHIPPED | OUTPATIENT
Start: 2024-10-17

## 2024-10-17 NOTE — TELEPHONE ENCOUNTER
Received call from [facility] - stating they received fax order of diabetic testing kit and supplies - pharmacist requesting to send orders electronically, with frequency of testing and refills    Advised pharmacist unsure of brand that is covered by pt's insurance - pharmacist advised to send Rx and will check to see what is best cost for pt. Requesting 3 separate Rx for lancets, strips, and device    Suggested alternatives - Rx for lancets, strips, and device sent to pharmacy

## 2024-10-29 ENCOUNTER — HOSPITAL ENCOUNTER (OUTPATIENT)
Dept: MAMMOGRAPHY | Age: 59
Discharge: HOME | End: 2024-10-29
Attending: FAMILY MEDICINE
Payer: COMMERCIAL

## 2024-10-29 DIAGNOSIS — Z00.00 WELL ADULT EXAM: ICD-10-CM

## 2024-10-29 DIAGNOSIS — Z12.31 ENCOUNTER FOR SCREENING MAMMOGRAM FOR MALIGNANT NEOPLASM OF BREAST: ICD-10-CM

## 2024-10-29 PROCEDURE — 77063 BREAST TOMOSYNTHESIS BI: CPT | Performed by: FAMILY MEDICINE

## 2024-10-29 PROCEDURE — 77067 SCR MAMMO BI INCL CAD: CPT | Performed by: FAMILY MEDICINE

## 2024-11-02 DIAGNOSIS — E11.9 TYPE 2 DIABETES MELLITUS WITHOUT COMPLICATION, WITHOUT LONG-TERM CURRENT USE OF INSULIN (HCC): ICD-10-CM

## 2024-11-02 NOTE — TELEPHONE ENCOUNTER
Diabetes Medication Protocol Cbfdhm0311/02/2024 08:17 AM   Protocol Details Microalbumin procedure in past 12 months or taking ACE/ARB    Last A1C < 7.5 and within past 6 months    In person appointment or virtual visit in the past 6 mos or appointment in next 3 mos    EGFRCR or GFRNAA > 50    GFR in the past 12 months          Last refill:   metFORMIN  MG Oral Tablet 24 Hr 30 tablet 0 10/7/2024     Last Visit: 10/3/24    Next Visit:   Future Appointments   Date Time Provider Department Center   11/5/2024 10:00 AM BK DEXA [facility] BK DEXA [address]   11/13/2024  3:00 PM Last, ROB Erazo [facility]   1/9/2025 11:20 AM Roxanne Mckinnon MD [facility] [place]   6/11/2025 11:15 AM Brenda Chapin DO [facility] [place]         Forward to Dr. Bingham please advise on refills. Thanks.

## 2024-11-04 DIAGNOSIS — E11.9 TYPE 2 DIABETES MELLITUS WITHOUT COMPLICATION, WITHOUT LONG-TERM CURRENT USE OF INSULIN (HCC): ICD-10-CM

## 2024-11-04 RX ORDER — METFORMIN HYDROCHLORIDE 500 MG/1
500 TABLET, EXTENDED RELEASE ORAL DAILY
Qty: 30 TABLET | Refills: 0 | Status: SHIPPED | OUTPATIENT
Start: 2024-11-04

## 2024-11-05 ENCOUNTER — LAB ENCOUNTER (OUTPATIENT)
Dept: LAB | Age: 59
End: 2024-11-05
Attending: INTERNAL MEDICINE
Payer: COMMERCIAL

## 2024-11-05 ENCOUNTER — HOSPITAL ENCOUNTER (OUTPATIENT)
Dept: BONE DENSITY | Age: 59
Discharge: HOME | End: 2024-11-05
Attending: FAMILY MEDICINE
Payer: COMMERCIAL

## 2024-11-05 ENCOUNTER — PATIENT MESSAGE (OUTPATIENT)
Dept: FAMILY MEDICINE CLINIC | Facility: CLINIC | Age: 59
End: 2024-11-05

## 2024-11-05 DIAGNOSIS — M11.20 CHONDROCALCINOSIS: ICD-10-CM

## 2024-11-05 DIAGNOSIS — E11.9 TYPE 2 DIABETES MELLITUS WITHOUT COMPLICATION, WITHOUT LONG-TERM CURRENT USE OF INSULIN (HCC): ICD-10-CM

## 2024-11-05 DIAGNOSIS — Z78.0 POST-MENOPAUSAL: ICD-10-CM

## 2024-11-05 DIAGNOSIS — R79.82 ELEVATED C-REACTIVE PROTEIN (CRP): ICD-10-CM

## 2024-11-05 DIAGNOSIS — M25.50 POLYARTHRALGIA: ICD-10-CM

## 2024-11-05 LAB
CRP SERPL-MCNC: <0.4 MG/DL (ref ?–0.5)
ERYTHROCYTE [SEDIMENTATION RATE] IN BLOOD: 13 MM/HR

## 2024-11-05 PROCEDURE — 86140 C-REACTIVE PROTEIN: CPT | Performed by: INTERNAL MEDICINE

## 2024-11-05 PROCEDURE — 85652 RBC SED RATE AUTOMATED: CPT | Performed by: INTERNAL MEDICINE

## 2024-11-05 PROCEDURE — 77080 DXA BONE DENSITY AXIAL: CPT | Performed by: FAMILY MEDICINE

## 2024-11-05 RX ORDER — METFORMIN HYDROCHLORIDE 500 MG/1
500 TABLET, EXTENDED RELEASE ORAL DAILY
Qty: 90 TABLET | Refills: 0 | OUTPATIENT
Start: 2024-11-05

## 2024-11-05 RX ORDER — METFORMIN HYDROCHLORIDE 500 MG/1
500 TABLET, EXTENDED RELEASE ORAL DAILY
Qty: 30 TABLET | Refills: 0 | OUTPATIENT
Start: 2024-11-05

## 2024-11-05 RX ORDER — METFORMIN HYDROCHLORIDE 500 MG/1
500 TABLET, EXTENDED RELEASE ORAL DAILY
Qty: 90 TABLET | Refills: 0 | Status: SHIPPED | OUTPATIENT
Start: 2024-11-05

## 2024-11-05 NOTE — TELEPHONE ENCOUNTER
E-Prescribing Status: Receipt confirmed by pharmacy (11/4/2024  8:18 AM CST) ; duplicate rx refill request; request denied.

## 2024-11-05 NOTE — TELEPHONE ENCOUNTER
E-Prescribing Status: Receipt confirmed by pharmacy (11/4/2024  8:18 AM CST); Duplicate rx request; request denied.

## 2024-11-07 ENCOUNTER — APPOINTMENT (OUTPATIENT)
Dept: ENDOCRINOLOGY | Facility: HOSPITAL | Age: 59
End: 2024-11-07
Attending: FAMILY MEDICINE
Payer: COMMERCIAL

## 2024-11-08 DIAGNOSIS — I10 ESSENTIAL HYPERTENSION: ICD-10-CM

## 2024-11-08 RX ORDER — METOPROLOL SUCCINATE 50 MG/1
50 TABLET, EXTENDED RELEASE ORAL DAILY
Qty: 90 TABLET | Refills: 0 | Status: SHIPPED | OUTPATIENT
Start: 2024-11-08

## 2024-11-08 NOTE — TELEPHONE ENCOUNTER
Hypertension Medications Protocol [id number]/08/2024 03:40 AM   Protocol Details CMP or BMP in past 12 months    Last BP reading less than 140/90    In person appointment or virtual visit in the past 12 mos or appointment in next 3 mos    EGFRCR or GFRNAA > 50

## 2024-11-14 ENCOUNTER — APPOINTMENT (OUTPATIENT)
Dept: ENDOCRINOLOGY | Facility: HOSPITAL | Age: 59
End: 2024-11-14
Attending: FAMILY MEDICINE
Payer: COMMERCIAL

## 2024-11-21 ENCOUNTER — APPOINTMENT (OUTPATIENT)
Dept: ENDOCRINOLOGY | Facility: HOSPITAL | Age: 59
End: 2024-11-21
Attending: FAMILY MEDICINE
Payer: COMMERCIAL

## 2024-12-02 ENCOUNTER — OFFICE VISIT (OUTPATIENT)
Dept: FAMILY MEDICINE CLINIC | Facility: CLINIC | Age: 59
End: 2024-12-02
Payer: COMMERCIAL

## 2024-12-02 VITALS
WEIGHT: 175.81 LBS | DIASTOLIC BLOOD PRESSURE: 78 MMHG | TEMPERATURE: 97 F | SYSTOLIC BLOOD PRESSURE: 114 MMHG | HEIGHT: 64 IN | RESPIRATION RATE: 18 BRPM | HEART RATE: 83 BPM | OXYGEN SATURATION: 97 % | BODY MASS INDEX: 30.02 KG/M2

## 2024-12-02 DIAGNOSIS — E11.9 TYPE 2 DIABETES MELLITUS WITHOUT COMPLICATION, WITHOUT LONG-TERM CURRENT USE OF INSULIN (HCC): Primary | ICD-10-CM

## 2024-12-02 LAB
CREAT UR-SCNC: 27.6 MG/DL
MICROALBUMIN UR-MCNC: <0.3 MG/DL

## 2024-12-02 PROCEDURE — 3044F HG A1C LEVEL LT 7.0%: CPT | Performed by: FAMILY MEDICINE

## 2024-12-02 PROCEDURE — 82043 UR ALBUMIN QUANTITATIVE: CPT | Performed by: FAMILY MEDICINE

## 2024-12-02 PROCEDURE — 3074F SYST BP LT 130 MM HG: CPT | Performed by: FAMILY MEDICINE

## 2024-12-02 PROCEDURE — 3078F DIAST BP <80 MM HG: CPT | Performed by: FAMILY MEDICINE

## 2024-12-02 PROCEDURE — 82570 ASSAY OF URINE CREATININE: CPT | Performed by: FAMILY MEDICINE

## 2024-12-02 PROCEDURE — 3008F BODY MASS INDEX DOCD: CPT | Performed by: FAMILY MEDICINE

## 2024-12-02 PROCEDURE — 99213 OFFICE O/P EST LOW 20 MIN: CPT | Performed by: FAMILY MEDICINE

## 2024-12-02 NOTE — PROGRESS NOTES
Chief Complaint   Patient presents with    Abdominal Pain     Happened on Saturday and Sunday;  Left upper quadrant pain; Pt did take acid reducer and suspects that was the problem was     Diabetes     Diabetic foot exam and microalb     HPI  Patient had some nonspecific abdominal pain in her left upper quadrant 2 days ago but after taking some PPI her symptoms improved.  Advised her to stop the PPI and stop metformin for 3 days after which she can stop statin for 3 days and see if her symptoms improve.  If so she is to let me know which medicine that she stopped helped to improve symptoms if no change then she is to go back on PPI therapy for 1 month.    Patient will schedule visual exam and agreeable to first exam today    ROS  As per HPI and all other systems reviewed and are negative      Past Medical History:    Abdominal distention    Abdominal pain    Acute diverticulitis    Acute medial meniscus tear of left knee    Formatting of this note might be different from the original.  Added automatically from request for surgery [id number]    Arthritis    Asthma (HCC)    allergy induced asthma    Autoimmune disease (HCC)    transverse myelitis     Back pain    Bloating    Body piercing    Constipation    Decorative tattoo    Diabetes (HCC)    Diarrhea    Diarrhea, unspecified    Esophageal reflux    Essential hypertension    Fatigue    Flatulence/gas pain/belching    Food intolerance    Frequent urination    Gastroesophageal reflux disease without esophagitis    Heartburn    Heavy menses    High blood pressure    High cholesterol    no meds    History of adverse reaction to anesthesia    took longer to awaken and heart rate was a bit high    History of Clostridium difficile infection    7/2022    Hyperlipidemia    Indigestion    Irregular bowel habits    Leaking of urine    Medial epicondylitis of right elbow    Menses painful    Mild intermittent asthma without complication (HCC)    Muscle spasm    Nausea    Painful  swallowing    PONV (postoperative nausea and vomiting)    Prediabetes    Problems with swallowing    PVC's (premature ventricular contractions)    Right lateral epicondylitis    Sleep disturbance    Splenic artery aneurysm (HCC)    recent diagnosis    Stool incontinence    Transverse myelitis (HCC)    has residual neurologic symptoms, sees neuro    Uncomfortable fullness after meals    Wears glasses       Past Surgical History:   Procedure Laterality Date    Cholecystectomy      Colonoscopy N/A 12/18/2023    Procedure: COLONOSCOPY;  Surgeon: Zafar Johnson DO;  Location:  ENDOSCOPY    [facility]      Foot fracture surgery  11/2017    no surgery    Knee arthroscopy      left knee    Knee surgery      Other surgical history  2018    right elbow surgery       Social History     Socioeconomic History    Marital status:    Tobacco Use    Smoking status: Never     Passive exposure: Never    Smokeless tobacco: Never   Vaping Use    Vaping status: Never Used   Substance and Sexual Activity    Alcohol use: Not Currently     Comment: Hurts Pt's stomach to consume alcohol    Drug use: No       Family History   Problem Relation Age of Onset    Lipids Father     Other (Other) Father 86        myelodysplasia    Hypertension Mother     Other (Other) Maternal Uncle         maternal family hx of anurysm    Bleeding Disorders Maternal Grandfather     Other (Other) Brother         sounds like PAD in legs, had some complications from treatment    Diabetes Brother     Other (Other) Maternal Aunt         \"anuerysms in stomach\"    Bleeding Disorders Maternal Aunt         Aortic anerysum        Medications Ordered Prior to Encounter[1]      Objective  Vitals:    12/02/24 1018   BP: 114/78   Pulse: 83   Resp: 18   Temp: 97.3 °F (36.3 °C)   TempSrc: Temporal   SpO2: 97%   Weight: 175 lb 12.8 oz (79.7 kg)   Height: 5' 4\" (1.626 m)     Physical Exam  Constitutional:       Appearance: Normal appearance.   HEENT:      Head: Normocephalic  and atraumatic.      Eyes: PERRLA no notable nystagmus     Ears: normal on observation     Nose: Nose normal.      Mouth: Mucous membranes are moist.      Neck: no masses no bruit  Cardiovascular:      Rate and Rhythm: Normal rate and regular rhythm.   Pulmonary:      Effort: Pulmonary effort is normal.      Breath sounds: Normal breath sounds.   Abdominal:      General: Bowel sounds are normal.      Palpations: Abdomen is soft. There is no mass.  Generalized discomfort but no notable masses or guarding  Musculoskeletal:         General: Normal range of motion.      Cervical back: Normal range of motion.   Skin:     General: Skin is warm and dry.   Neurological:      General: No focal deficit present.      Mental Status: She is alert and oriented to person, place, and time.   Psychiatric:         Mood and Affect: Mood normal.         Thought Content: Thought content normal.   Bilateral barefoot skin diabetic exam is normal, visualized feet and the appearance is normal.  Bilateral monofilament/sensation of both feet is normal.  Pulsation pedal pulse exam of both lower legs/feet is normal as well.       Assessment and Plan  Candie was seen today for abdominal pain and diabetes.    Diagnoses and all orders for this visit:    Type 2 diabetes mellitus without complication, without long-term current use of insulin (HCC)  -     Microalb/Creat Ratio, Random Urine [E]; Future  -     Microalb/Creat Ratio, Random Urine [E]           Follow up  No follow-ups on file.      Patient Instructions  There are no Patient Instructions on file for this visit.       Roxanne Mckinnon MD       This note was created by Dragon voice recognition. Errors in content may be related to improper recognition by the system; efforts to review and correct have been done but errors may still exist. Please be advised the primary purpose of this note is for me to communicate medical care. Standard sentence structure is not always used. Medical  terminology and medical abbreviations may be used. There may be grammatical, typographical, and automated fill ins that may have errors missed in proofreading.          [1]   Current Outpatient Medications on File Prior to Visit   Medication Sig Dispense Refill    METOPROLOL SUCCINATE ER 50 MG Oral Tablet 24 Hr TAKE 1 TABLET(50 MG) BY MOUTH DAILY 90 tablet 0    metFORMIN  MG Oral Tablet 24 Hr Take 1 tablet (500 mg total) by mouth daily. 90 tablet 0    Blood Glucose Monitoring Suppl w/Device Does not apply Kit Check blood glucose once daily 1 kit 0    Fingerstix Lancets Does not apply Misc Check blood glucose once daily 108 each 1    Glucose Blood (ONETOUCH ULTRA) In Vitro Strip Test blood glucose once daily 125 each 1    ATORVASTATIN 20 MG Oral Tab TAKE 1 TABLET(20 MG) BY MOUTH EVERY NIGHT 90 tablet 0    Rhubarb (ESTROVEN MENOPAUSE RELIEF) 4 MG Oral Tab Estroven Complete Menopause Relief 4 mg tablet, [RxNorm: 0]      triamcinolone 0.1 % External Cream APPLY EVERY OTHER DAY TO RASH      albuterol 108 (90 Base) MCG/ACT Inhalation Aero Soln Inhale 1-2 puffs into the lungs every 4 (four) hours as needed for Wheezing or Shortness of Breath. 1 each 0    Cholecalciferol (VITAMIN D) 50 MCG (2000 UT) Oral Tab Take by mouth.      Ascorbic Acid (VITAMIN C) 100 MG Oral Tab Take 1 tablet (100 mg total) by mouth daily.      Multiple Vitamins-Minerals (MULTI-VITAMIN/MINERALS) Oral Tab Take 1 tablet by mouth daily.      dicyclomine 20 MG Oral Tab dicyclomine 20 MG Oral Tab, [RxNorm: 848792]      BACLOFEN 20 MG Oral Tab TAKE 1 TABLET BY MOUTH TWICE DAILY OR THREE TIMES DAILY (Patient taking differently: Take 1 tablet (20 mg total) by mouth 2 (two) times daily. TAKE 1 TABLET BY MOUTH TWICE DAILY OR THREE TIMES DAILY) 270 tablet 3     No current facility-administered medications on file prior to visit.

## 2024-12-08 ENCOUNTER — PATIENT MESSAGE (OUTPATIENT)
Dept: FAMILY MEDICINE CLINIC | Facility: CLINIC | Age: 59
End: 2024-12-08

## 2024-12-09 NOTE — TELEPHONE ENCOUNTER
Have Patient decrease to 250mg daily if still having issues will switch to BID dosage and will start with 250mg BID with non-extended release medication. If still issues will need to find alternative

## 2024-12-23 ENCOUNTER — APPOINTMENT (OUTPATIENT)
Dept: ENDOCRINOLOGY | Facility: HOSPITAL | Age: 59
End: 2024-12-23
Attending: FAMILY MEDICINE
Payer: COMMERCIAL

## 2025-01-09 ENCOUNTER — OFFICE VISIT (OUTPATIENT)
Dept: FAMILY MEDICINE CLINIC | Facility: CLINIC | Age: 60
End: 2025-01-09
Payer: COMMERCIAL

## 2025-01-09 VITALS
HEIGHT: 64 IN | TEMPERATURE: 97 F | SYSTOLIC BLOOD PRESSURE: 122 MMHG | BODY MASS INDEX: 29.84 KG/M2 | RESPIRATION RATE: 19 BRPM | HEART RATE: 74 BPM | WEIGHT: 174.81 LBS | DIASTOLIC BLOOD PRESSURE: 70 MMHG

## 2025-01-09 DIAGNOSIS — G04.91 MYELITIS (HCC): ICD-10-CM

## 2025-01-09 DIAGNOSIS — R19.8 ABDOMINAL DISORDERS: ICD-10-CM

## 2025-01-09 DIAGNOSIS — J45.20 MILD INTERMITTENT ASTHMA WITHOUT COMPLICATION (HCC): ICD-10-CM

## 2025-01-09 DIAGNOSIS — E78.2 ELEVATED TRIGLYCERIDES WITH HIGH CHOLESTEROL: ICD-10-CM

## 2025-01-09 DIAGNOSIS — E11.9 TYPE 2 DIABETES MELLITUS WITHOUT COMPLICATION, WITHOUT LONG-TERM CURRENT USE OF INSULIN (HCC): Primary | ICD-10-CM

## 2025-01-09 LAB — HEMOGLOBIN A1C: 6 % (ref 4.3–5.6)

## 2025-01-09 PROCEDURE — 3074F SYST BP LT 130 MM HG: CPT | Performed by: FAMILY MEDICINE

## 2025-01-09 PROCEDURE — 86364 TISS TRNSGLTMNASE EA IG CLAS: CPT | Performed by: FAMILY MEDICINE

## 2025-01-09 PROCEDURE — 82784 ASSAY IGA/IGD/IGG/IGM EACH: CPT | Performed by: FAMILY MEDICINE

## 2025-01-09 PROCEDURE — 99213 OFFICE O/P EST LOW 20 MIN: CPT | Performed by: FAMILY MEDICINE

## 2025-01-09 PROCEDURE — 3044F HG A1C LEVEL LT 7.0%: CPT | Performed by: FAMILY MEDICINE

## 2025-01-09 PROCEDURE — 3008F BODY MASS INDEX DOCD: CPT | Performed by: FAMILY MEDICINE

## 2025-01-09 PROCEDURE — 3078F DIAST BP <80 MM HG: CPT | Performed by: FAMILY MEDICINE

## 2025-01-09 PROCEDURE — 83036 HEMOGLOBIN GLYCOSYLATED A1C: CPT | Performed by: FAMILY MEDICINE

## 2025-01-09 NOTE — PROGRESS NOTES
Chief Complaint   Patient presents with    Follow - Up    Diabetes    Medication Follow-Up     Metformin hurts Pt's tummy; would lie to discuss alternatives      HPI  Patient is her for diabetes follow up she is doing ok but did not go back on metformin. She sees rheumatologist and had myelitis that has since resoloved but has some residual neurological deficits    She has no other complaints.     Asthma is stable    ROS  As per HPI and all other systems reviewed and are negative      Past Medical History:    Abdominal distention    Abdominal pain    Acute diverticulitis    Acute medial meniscus tear of left knee    Formatting of this note might be different from the original.  Added automatically from request for surgery [id number]    Arthritis    Asthma ([facility])    allergy induced asthma    Autoimmune disease (HCC)    transverse myelitis     Back pain    Bloating    Body piercing    Constipation    Decorative tattoo    Diabetes ([facility])    Diarrhea    Diarrhea, unspecified    Esophageal reflux    Essential hypertension    Fatigue    Flatulence/gas pain/belching    Food intolerance    Frequent urination    Gastroesophageal reflux disease without esophagitis    Heartburn    Heavy menses    High blood pressure    High cholesterol    no meds    History of adverse reaction to anesthesia    took longer to awaken and heart rate was a bit high    History of Clostridium difficile infection    7/2022    Hyperlipidemia    Indigestion    Irregular bowel habits    Leaking of urine    Medial epicondylitis of right elbow    Menses painful    Mild intermittent asthma without complication ([facility])    Muscle spasm    Nausea    Painful swallowing    PONV (postoperative nausea and vomiting)    Prediabetes    Problems with swallowing    PVC's (premature ventricular contractions)    Right lateral epicondylitis    Sleep disturbance    Splenic artery aneurysm ([facility])    recent diagnosis    Stool incontinence    Transverse myelitis (HCC)    has  residual neurologic symptoms, sees neuro    Uncomfortable fullness after meals    Urinary tract infection    Wears glasses       Past Surgical History:   Procedure Laterality Date    Cholecystectomy      Colonoscopy N/A 12/18/2023    Procedure: COLONOSCOPY;  Surgeon: Zafar Johnson DO;  Location:  ENDOSCOPY    [facility]      Foot fracture surgery  11/2017    no surgery    Knee arthroscopy      left knee    Knee surgery      Other surgical history  2018    right elbow surgery       Social History     Socioeconomic History    Marital status:    Tobacco Use    Smoking status: Never     Passive exposure: Never    Smokeless tobacco: Never   Vaping Use    Vaping status: Never Used   Substance and Sexual Activity    Alcohol use: Not Currently     Comment: Hurts Pt's stomach to consume alcohol    Drug use: No       Family History   Problem Relation Age of Onset    Lipids Father     Other (Other) Father 86        myelodysplasia    Hypertension Mother     Other (Other) Maternal Uncle         maternal family hx of anurysm    Bleeding Disorders Maternal Grandfather     Other (Other) Brother         sounds like PAD in legs, had some complications from treatment    Diabetes Brother     Other (Other) Maternal Aunt         \"anuerysms in stomach\"    Bleeding Disorders Maternal Aunt         Aortic anerysum        Medications Ordered Prior to Encounter[1]      Objective  Vitals:    01/09/25 1128   BP: 122/70   Pulse: 74   Resp: 19   Temp: 97.2 °F (36.2 °C)   TempSrc: Temporal   Weight: 174 lb 12.8 oz (79.3 kg)   Height: 5' 4\" (1.626 m)     Physical Exam  Constitutional:       Appearance: Normal appearance.   HEENT:      Head: Normocephalic and atraumatic.      Eyes: PERRLA no notable nystagmus     Ears: normal on observation     Nose: Nose normal.      Mouth: Mucous membranes are moist.      Neck: no masses no bruit  Cardiovascular:      Rate and Rhythm: Normal rate and regular rhythm.   Pulmonary:      Effort: Pulmonary effort  is normal.      Breath sounds: Normal breath sounds.   Abdominal:      General: Bowel sounds are normal.      Palpations: Abdomen is soft. There is no mass.   Musculoskeletal:         General: Normal range of motion.      Cervical back: Normal range of motion.   Skin:     General: Skin is warm and dry.   Neurological:      General: No focal deficit present.      Mental Status: She is alert and oriented to person, place, and time.   Psychiatric:         Mood and Affect: Mood normal.         Thought Content: Thought content normal.     Bilateral barefoot skin diabetic exam is normal, visualized feet and the appearance is normal.  Bilateral monofilament/sensation of both feet is normal.  Pulsation pedal pulse exam of both lower legs/feet is normal as well.       Assessment and Plan  Candie was seen today for follow - up, diabetes and medication follow-up.    Diagnoses and all orders for this visit:    Type 2 diabetes mellitus without complication, without long-term current use of insulin (HCC) - A1c 6.0. Patient to continue diet and exercise to help wth managing this  -     Cancel: Microalb/Creat Ratio, Random Urine [E]; Future  -     POC Hemoglobin A1C  -     CELIAC DISEASE SCREEN Reflex; Future  -     Cancel: Microalb/Creat Ratio, Random Urine [E]  -     CELIAC DISEASE SCREEN Reflex    Mild intermittent asthma without complication (HCC) - stable     Myelitis (HCC) - resolved but monitored by neurology     Abdominal disorders  -     CELIAC DISEASE SCREEN Reflex; Future  -     CELIAC DISEASE SCREEN Reflex    Elevated triglycerides with high cholesterol  -     Lipid Panel [E]; Future           Follow up  No follow-ups on file.      Patient Instructions  Patient Instructions   Drink benefiber every morning followed by 2 times the amount of fluid you drank as water within 2 hours of your drink with benefiber eg 6 oz coffee needs 12 oz water  8 oz coffee needs 16 oz water    Then take the remainder of your 64oz of water  through out the day    Take a stool softener twice a day with food and take miralax every night for three nights then twice a week if you are not having long soft stool then increase the stool softener to threes a day    Eat two fruits daily    It will take three weeks for your bowels to get used to this regime and you will be bloated and nauseous a lot       Roxanne Mckinnon MD       This note was created by [employer] voice recognition. Errors in content may be related to improper recognition by the system; efforts to review and correct have been done but errors may still exist. Please be advised the primary purpose of this note is for me to communicate medical care. Standard sentence structure is not always used. Medical terminology and medical abbreviations may be used. There may be grammatical, typographical, and automated fill ins that may have errors missed in proofreading.          [1]   Current Outpatient Medications on File Prior to Visit   Medication Sig Dispense Refill    METOPROLOL SUCCINATE ER 50 MG Oral Tablet 24 Hr TAKE 1 TABLET(50 MG) BY MOUTH DAILY 90 tablet 0    Blood Glucose Monitoring Suppl w/Device Does not apply Kit Check blood glucose once daily 1 kit 0    Fingerstix Lancets Does not apply Misc Check blood glucose once daily 108 each 1    Glucose Blood (ONETOUCH ULTRA) In Vitro Strip Test blood glucose once daily 125 each 1    ATORVASTATIN 20 MG Oral Tab TAKE 1 TABLET(20 MG) BY MOUTH EVERY NIGHT 90 tablet 0    Rhubarb (ESTROVEN MENOPAUSE RELIEF) 4 MG Oral Tab Estroven Complete Menopause Relief 4 mg tablet, [RxNorm: 0]      triamcinolone 0.1 % External Cream APPLY EVERY OTHER DAY TO RASH      albuterol 108 (90 Base) MCG/ACT Inhalation Aero Soln Inhale 1-2 puffs into the lungs every 4 (four) hours as needed for Wheezing or Shortness of Breath. 1 each 0    Cholecalciferol (VITAMIN D) 50 MCG (2000 UT) Oral Tab Take by mouth.      Ascorbic Acid (VITAMIN C) 100 MG Oral Tab Take 1 tablet (100 mg  total) by mouth daily.      Multiple Vitamins-Minerals (MULTI-VITAMIN/MINERALS) Oral Tab Take 1 tablet by mouth daily.      metFORMIN  MG Oral Tablet 24 Hr Take 1 tablet (500 mg total) by mouth daily. (Patient not taking: Reported on 1/9/2025) 90 tablet 0    dicyclomine 20 MG Oral Tab dicyclomine 20 MG Oral Tab, [RxNorm: 357345]      BACLOFEN 20 MG Oral Tab TAKE 1 TABLET BY MOUTH TWICE DAILY OR THREE TIMES DAILY (Patient taking differently: Take 1 tablet (20 mg total) by mouth 2 (two) times daily. TAKE 1 TABLET BY MOUTH TWICE DAILY OR THREE TIMES DAILY) 270 tablet 3     No current facility-administered medications on file prior to visit.

## 2025-01-09 NOTE — PROGRESS NOTES
Called and informed Pt of A1C result of 6.0% and how Dr. Bingham stated Pt can continue off of Metformin to which Pt verbalized their understanding.

## 2025-01-09 NOTE — PATIENT INSTRUCTIONS
Drink benefiber every morning followed by 2 times the amount of fluid you drank as water within 2 hours of your drink with benefiber eg 6 oz coffee needs 12 oz water  8 oz coffee needs 16 oz water    Then take the remainder of your 64oz of water through out the day    Take a stool softener twice a day with food and take miralax every night for three nights then twice a week if you are not having long soft stool then increase the stool softener to threes a day    Eat two fruits daily    It will take three weeks for your bowels to get used to this regime and you will be bloated and nauseous a lot

## 2025-01-10 LAB — IGA SERPL-MCNC: 156.2 MG/DL (ref 70–312)

## 2025-01-13 ENCOUNTER — LAB ENCOUNTER (OUTPATIENT)
Dept: LAB | Age: 60
End: 2025-01-13
Attending: FAMILY MEDICINE
Payer: COMMERCIAL

## 2025-01-13 ENCOUNTER — LAB ENCOUNTER (OUTPATIENT)
Dept: LAB | Age: 60
End: 2025-01-13
Payer: COMMERCIAL

## 2025-01-13 DIAGNOSIS — R43.8 BAD TASTE IN MOUTH: ICD-10-CM

## 2025-01-13 DIAGNOSIS — E78.2 ELEVATED TRIGLYCERIDES WITH HIGH CHOLESTEROL: ICD-10-CM

## 2025-01-13 LAB
CHOLEST SERPL-MCNC: 158 MG/DL (ref ?–200)
FASTING PATIENT LIPID ANSWER: YES
HDLC SERPL-MCNC: 38 MG/DL (ref 40–59)
LDLC SERPL CALC-MCNC: 92 MG/DL (ref ?–100)
NONHDLC SERPL-MCNC: 120 MG/DL (ref ?–130)
TRIGL SERPL-MCNC: 161 MG/DL (ref 30–149)
VLDLC SERPL CALC-MCNC: 26 MG/DL (ref 0–30)

## 2025-01-13 PROCEDURE — 80061 LIPID PANEL: CPT | Performed by: FAMILY MEDICINE

## 2025-01-14 LAB — TTG IGA SER-ACNC: <0.2 U/ML (ref ?–7)

## 2025-01-15 LAB — ZINC: 80 UG/DL

## 2025-02-17 ENCOUNTER — TELEPHONE (OUTPATIENT)
Dept: FAMILY MEDICINE CLINIC | Facility: CLINIC | Age: 60
End: 2025-02-17

## 2025-02-17 RX ORDER — ATORVASTATIN CALCIUM 20 MG/1
20 TABLET, FILM COATED ORAL NIGHTLY
Qty: 90 TABLET | Refills: 0 | Status: SHIPPED | OUTPATIENT
Start: 2025-02-17

## 2025-02-17 NOTE — TELEPHONE ENCOUNTER
Roxanne Mckinnon MD P Jeganmohan, Janandana Nurse  Please let patient know that her insurance company recommends that we place a GLP-1 to try and improve her risk factors for heart disease and diabetes.  Please have her come in to discuss further management.  Thank you          Previous Messages       ----- Message -----  From: [email or web address] ([employer] [facility])  Sent: 2/1/2025  11:22 AM CST  To: Roxanne Mckinnon MD ([employer] [facility])  Subject: Safety and Health Notification      Associated Attachment(s)    Continuity of Care Document  02/01/2025      CANDIDA LEONARDO - 59 y.o. Female; born [birth year][employer]: Health and Safety Notification, generated on Feb. 01, 2025February 01, 2025   Health and Safety Notification      [employer] works with your patients' plan sponsors to provide you with the enclosed [employer] safety and health considerations for patients in your practice. Please review the health information provided and make any changes in therapy that you believe are appropriate. [employer] understands that the information may not be applicable to every patient's therapy and therefore presents it as informational only.    Therapy Consideration: Cardiovascular Risk Reduction in Diabetes and Coronary Artery Disease Your patient is being treated for diabetes and may have coronary artery disease based on claims records. Please consider whether a guideline-recommended antidiabetic agent with demonstrated cardiovascular disease benefit would be appropriate for this patient. ADA states that an SGLT2 inhibitor or GLP1 receptor agonist (alone or in combination) with strong evidence for cardiovascular risk reduction should be considered in patients with atherosclerotic cardiovascular disease. SGLT2 inhibitors, specifically, may additionally reduce risk of hospitalization due to heart failure.

## 2025-02-19 ENCOUNTER — OFFICE VISIT (OUTPATIENT)
Dept: FAMILY MEDICINE CLINIC | Facility: CLINIC | Age: 60
End: 2025-02-19
Payer: COMMERCIAL

## 2025-02-19 ENCOUNTER — HOSPITAL ENCOUNTER (OUTPATIENT)
Dept: GENERAL RADIOLOGY | Age: 60
Discharge: HOME | End: 2025-02-19
Attending: FAMILY MEDICINE
Payer: COMMERCIAL

## 2025-02-19 VITALS
DIASTOLIC BLOOD PRESSURE: 68 MMHG | HEIGHT: 63.39 IN | OXYGEN SATURATION: 97 % | BODY MASS INDEX: 30.86 KG/M2 | RESPIRATION RATE: 18 BRPM | HEART RATE: 87 BPM | TEMPERATURE: 98 F | SYSTOLIC BLOOD PRESSURE: 114 MMHG | WEIGHT: 176.38 LBS

## 2025-02-19 DIAGNOSIS — I25.10 CORONARY ARTERY DISEASE DUE TO CALCIFIED CORONARY LESION: ICD-10-CM

## 2025-02-19 DIAGNOSIS — R22.42 ANKLE MASS, LEFT: ICD-10-CM

## 2025-02-19 DIAGNOSIS — I25.84 CORONARY ARTERY DISEASE DUE TO CALCIFIED CORONARY LESION: ICD-10-CM

## 2025-02-19 DIAGNOSIS — E11.9 TYPE 2 DIABETES MELLITUS WITHOUT COMPLICATION, WITHOUT LONG-TERM CURRENT USE OF INSULIN (HCC): Primary | ICD-10-CM

## 2025-02-19 LAB
CREAT UR-SCNC: 19.6 MG/DL
MICROALBUMIN UR-MCNC: <0.3 MG/DL

## 2025-02-19 PROCEDURE — 82570 ASSAY OF URINE CREATININE: CPT | Performed by: FAMILY MEDICINE

## 2025-02-19 PROCEDURE — 3074F SYST BP LT 130 MM HG: CPT | Performed by: FAMILY MEDICINE

## 2025-02-19 PROCEDURE — 73610 X-RAY EXAM OF ANKLE: CPT | Performed by: FAMILY MEDICINE

## 2025-02-19 PROCEDURE — 99214 OFFICE O/P EST MOD 30 MIN: CPT | Performed by: FAMILY MEDICINE

## 2025-02-19 PROCEDURE — 3078F DIAST BP <80 MM HG: CPT | Performed by: FAMILY MEDICINE

## 2025-02-19 PROCEDURE — 3008F BODY MASS INDEX DOCD: CPT | Performed by: FAMILY MEDICINE

## 2025-02-19 PROCEDURE — 82043 UR ALBUMIN QUANTITATIVE: CPT | Performed by: FAMILY MEDICINE

## 2025-02-19 RX ORDER — ROSUVASTATIN CALCIUM 20 MG/1
30 TABLET, COATED ORAL NIGHTLY
Qty: 45 TABLET | Refills: 2 | Status: SHIPPED | OUTPATIENT
Start: 2025-02-19

## 2025-02-19 RX ORDER — EZETIMIBE 10 MG/1
10 TABLET ORAL DAILY
COMMUNITY
Start: 2025-01-20

## 2025-02-19 NOTE — PROGRESS NOTES
Chief Complaint   Patient presents with    Medication Follow-Up     HPI  Patient is here for follow-up regarding starting a GLP-1.  She was told to start rosuvastatin but discussed with cardiologist and then he advised to try Zetia but she was not able to tolerate this and now is agreeable to trying simvastatin after he agreed also.    Patient has worked really hard to improve her weight and her A1c came down to 6 but she was not able to tolerate the metformin that had helped her at the time.  She is agreeable to doing the GLP-1 but would like to think about it some more.    Patient is complaining of a mass on her left ankle that she noticed a few weeks ago but it causes discomfort when she is walking and rubs on her shoes.  She would like to have it evaluated further and has an appointment set up with a podiatrist but is agreeable to getting x-rays prior to seeing the specialist.    ROS  As per HPI and all other systems reviewed and are negative      Past Medical History:    Abdominal distention    Abdominal pain    Acute diverticulitis    Acute medial meniscus tear of left knee    Formatting of this note might be different from the original.  Added automatically from request for surgery [id number]    Arthritis    Asthma (HCC)    allergy induced asthma    Autoimmune disease (HCC)    transverse myelitis     Back pain    Bloating    Body piercing    Constipation    Decorative tattoo    Diabetes ([facility])    Diarrhea    Diarrhea, unspecified    Esophageal reflux    Essential hypertension    Fatigue    Flatulence/gas pain/belching    Food intolerance    Frequent urination    Gastroesophageal reflux disease without esophagitis    Heartburn    Heavy menses    High blood pressure    High cholesterol    no meds    History of adverse reaction to anesthesia    took longer to awaken and heart rate was a bit high    History of Clostridium difficile infection    7/2022    Hyperlipidemia    Indigestion    Irregular bowel habits     Leaking of urine    Medial epicondylitis of right elbow    Menses painful    Mild intermittent asthma without complication (HCC)    Muscle spasm    Nausea    Painful swallowing    PONV (postoperative nausea and vomiting)    Prediabetes    Problems with swallowing    PVC's (premature ventricular contractions)    Right lateral epicondylitis    Sleep disturbance    Splenic artery aneurysm    recent diagnosis    Stool incontinence    Transverse myelitis (HCC)    has residual neurologic symptoms, sees neuro    Uncomfortable fullness after meals    Urinary tract infection    Wears glasses       Past Surgical History:   Procedure Laterality Date    Cholecystectomy      Colonoscopy N/A 12/18/2023    Procedure: COLONOSCOPY;  Surgeon: Zafar Johnson DO;  Location:  ENDOSCOPY    [facility]      Foot fracture surgery  11/2017    no surgery    Knee arthroscopy      left knee    Knee surgery      Other surgical history  2018    right elbow surgery       Social History     Socioeconomic History    Marital status:    Tobacco Use    Smoking status: Never     Passive exposure: Never    Smokeless tobacco: Never   Vaping Use    Vaping status: Never Used   Substance and Sexual Activity    Alcohol use: Not Currently     Comment: Hurts Pt's stomach to consume alcohol    Drug use: No       Family History   Problem Relation Age of Onset    Lipids Father     Other (Other) Father 86        myelodysplasia    Hypertension Mother     Other (Other) Maternal Uncle         maternal family hx of anurysm    Bleeding Disorders Maternal Grandfather     Other (Other) Brother         sounds like PAD in legs, had some complications from treatment    Diabetes Brother     Other (Other) Maternal Aunt         \"anuerysms in stomach\"    Bleeding Disorders Maternal Aunt         Aortic anerysum        Medications Ordered Prior to Encounter[1]      Objective  Vitals:    02/19/25 1348   BP: 114/68   Pulse: 87   Resp: 18   Temp: 97.7 °F (36.5 °C)   TempSrc:  Temporal   SpO2: 97%   Weight: 176 lb 6.4 oz (80 kg)   Height: 5' 3.39\" (1.61 m)     Physical Exam  Constitutional:       Appearance: Normal appearance.   HEENT:      Head: Normocephalic and atraumatic.      Eyes: PERRLA no notable nystagmus     Ears: normal on observation     Nose: Nose normal.      Mouth: Mucous membranes are moist.      Neck: no masses no bruit  Musculoskeletal:         General: Normal range of motion.      Cervical back: Normal range of motion.   Skin:     General: Skin is warm and dry.  Patient has a palpable mass over the base of her left Achilles that is tender to touch slightly erythematous from rubbing on his shoe.  Neurological:      General: No focal deficit present.      Mental Status: She is alert and oriented to person, place, and time.   Psychiatric:         Mood and Affect: Mood normal.         Thought Content: Thought content normal.       Assessment and Plan  Candie was seen today for medication follow-up.    Diagnoses and all orders for this visit:    Type 2 diabetes mellitus without complication, without long-term current use of insulin (HCC)  -     Microalb/Creat Ratio, Random Urine [E]; Future  -     Diabetic Retinopathy Exam  OU - Both Eyes; Future  -     Microalb/Creat Ratio, Random Urine [E]  -     Hemoglobin A1C [E]; Future  -     Diabetic Retinopathy Exam  OU - Both Eyes    Coronary artery disease due to calcified coronary lesion  -     rosuvastatin 20 MG Oral Tab; Take 1.5 tablets (30 mg total) by mouth nightly.  -     Lipid Panel [E]; Future  -     Comp Metabolic Panel (14) [E]; Future    Ankle mass, left  -     XR ANKLE (MIN 3 VIEWS), LEFT (CPT=73610); Future           Follow up  No follow-ups on file.      Patient Instructions  There are no Patient Instructions on file for this visit.       Roxanne Mckinnon MD       This note was created by [employer] voice recognition. Errors in content may be related to improper recognition by the system; efforts to review and  correct have been done but errors may still exist. Please be advised the primary purpose of this note is for me to communicate medical care. Standard sentence structure is not always used. Medical terminology and medical abbreviations may be used. There may be grammatical, typographical, and automated fill ins that may have errors missed in proofreading.          [1]   Current Outpatient Medications on File Prior to Visit   Medication Sig Dispense Refill    PSYLLIUM HUSK OR Take 500 mg by mouth once.      ezetimibe 10 MG Oral Tab Take 1 tablet (10 mg total) by mouth daily.      METOPROLOL SUCCINATE ER 50 MG Oral Tablet 24 Hr TAKE 1 TABLET(50 MG) BY MOUTH DAILY 90 tablet 0    Blood Glucose Monitoring Suppl w/Device Does not apply Kit Check blood glucose once daily 1 kit 0    Fingerstix Lancets Does not apply Misc Check blood glucose once daily 108 each 1    Glucose Blood (ONETOUCH ULTRA) In Vitro Strip Test blood glucose once daily 125 each 1    dicyclomine 20 MG Oral Tab dicyclomine 20 MG Oral Tab, [RxNorm: 112644]      Rhubarb (ESTROVEN MENOPAUSE RELIEF) 4 MG Oral Tab Estroven Complete Menopause Relief 4 mg tablet, [RxNorm: 0]      triamcinolone 0.1 % External Cream APPLY EVERY OTHER DAY TO RASH      albuterol 108 (90 Base) MCG/ACT Inhalation Aero Soln Inhale 1-2 puffs into the lungs every 4 (four) hours as needed for Wheezing or Shortness of Breath. 1 each 0    Cholecalciferol (VITAMIN D) 50 MCG (2000 UT) Oral Tab Take by mouth.      Ascorbic Acid (VITAMIN C) 100 MG Oral Tab Take 1 tablet (100 mg total) by mouth daily.      Multiple Vitamins-Minerals (MULTI-VITAMIN/MINERALS) Oral Tab Take 1 tablet by mouth daily.      BACLOFEN 20 MG Oral Tab TAKE 1 TABLET BY MOUTH TWICE DAILY OR THREE TIMES DAILY (Patient taking differently: Take 1 tablet (20 mg total) by mouth 2 (two) times daily. TAKE 1 TABLET BY MOUTH TWICE DAILY OR THREE TIMES DAILY) 270 tablet 3     No current facility-administered medications on file prior  to visit.

## 2025-02-20 ENCOUNTER — TELEPHONE (OUTPATIENT)
Dept: FAMILY MEDICINE CLINIC | Facility: CLINIC | Age: 60
End: 2025-02-20

## 2025-02-20 NOTE — TELEPHONE ENCOUNTER
Called patient to discuss imaging results, patient stated they will be seeing their podiatrist on Saturday 2/23 and would like a CD to be made to give to them. CD has been placed up from with the clerical team. Patient to  CD tomorrow.

## 2025-02-23 ENCOUNTER — PATIENT MESSAGE (OUTPATIENT)
Dept: FAMILY MEDICINE CLINIC | Facility: CLINIC | Age: 60
End: 2025-02-23

## 2025-02-26 ENCOUNTER — TELEMEDICINE (OUTPATIENT)
Dept: FAMILY MEDICINE CLINIC | Facility: CLINIC | Age: 60
End: 2025-02-26
Payer: COMMERCIAL

## 2025-02-26 DIAGNOSIS — E11.9 TYPE 2 DIABETES MELLITUS WITHOUT COMPLICATION, WITHOUT LONG-TERM CURRENT USE OF INSULIN (HCC): Primary | ICD-10-CM

## 2025-02-26 PROCEDURE — 99213 OFFICE O/P EST LOW 20 MIN: CPT | Performed by: FAMILY MEDICINE

## 2025-02-26 RX ORDER — TIRZEPATIDE 2.5 MG/.5ML
2.5 INJECTION, SOLUTION SUBCUTANEOUS WEEKLY
Qty: 2 ML | Refills: 0 | Status: SHIPPED | OUTPATIENT
Start: 2025-02-26

## 2025-02-26 NOTE — PROGRESS NOTES
Telehealth outside of [facility]  Telehealth Verbal Consent   I conducted a telehealth visit with Candie Liu today, 02/26/25, which was completed using two-way, real-time interactive audio and video communication. This has been done in good owen to provide continuity of care in the best interest of the provider-patient relationship, due to the COVID -19 public health crisis/national emergency where restrictions of face-to-face office visits are ongoing. Every conscious effort was taken to allow for sufficient and adequate time to complete the visit.  The patient was made aware of the limitations of the telehealth visit, including treatment limitations as no physical exam could be performed.  The patient was advised to call 911 or to go to the ER in case there was an emergency.  The patient was also advised of the potential privacy & security concerns related to the telehealth platform.   The patient was made aware of where to find [facility]'s notice of privacy practices, telehealth consent form and other related consent forms and documents.  which are located on the [facility] website. The patient verbally agreed to telehealth consent form, related consents and the risks discussed.    Lastly, the patient confirmed that they were in [place].   Included in this visit, time may have been spent reviewing labs, medications, radiology tests and decision making. Appropriate medical decision-making and tests are ordered as detailed in the plan of care above.  Coding/billing information is submitted for this visit based on complexity of care and/or time spent for the visit.    Candie Liu is a 59 year old female.    Chief Complaint   Patient presents with    Medication Request       HPI:   Patient is here to discuss treatment with GLP-1 for her diabetes.  She would like to lose some weight and hence request GLP-1 therapy and would like to try Mounjaro as this is what her friend is taking.  Medication benefits and side  effects were discussed with the patient and she is agreeable to starting a low-dose and seeing how she does on this for at least 3 months.    Patient Active Problem List   Diagnosis    Elevated triglycerides with high cholesterol    Allergic rhinitis    Mild intermittent asthma without complication (HCC)    Restless leg    Essential hypertension    Prediabetes    Aneurysm, splenic artery    Agatston coronary artery calcium score between 200 and 399    Brachial neuritis    Cholelithiasis    Irritable colon     Current Outpatient Medications   Medication Sig Dispense Refill    Tirzepatide (MOUNJARO) 2.5 MG/0.5ML Subcutaneous Solution Auto-injector Inject 2.5 mg into the skin once a week. 2 mL 0    PSYLLIUM HUSK OR Take 500 mg by mouth once.      rosuvastatin 20 MG Oral Tab Take 1.5 tablets (30 mg total) by mouth nightly. 45 tablet 2    ezetimibe 10 MG Oral Tab Take 1 tablet (10 mg total) by mouth daily.      METOPROLOL SUCCINATE ER 50 MG Oral Tablet 24 Hr TAKE 1 TABLET(50 MG) BY MOUTH DAILY 90 tablet 0    Blood Glucose Monitoring Suppl w/Device Does not apply Kit Check blood glucose once daily 1 kit 0    Fingerstix Lancets Does not apply Misc Check blood glucose once daily 108 each 1    Glucose Blood (ONETOUCH ULTRA) In Vitro Strip Test blood glucose once daily 125 each 1    dicyclomine 20 MG Oral Tab dicyclomine 20 MG Oral Tab, [RxNorm: 895395]      Rhubarb (ESTROVEN MENOPAUSE RELIEF) 4 MG Oral Tab Estroven Complete Menopause Relief 4 mg tablet, [RxNorm: 0]      triamcinolone 0.1 % External Cream APPLY EVERY OTHER DAY TO RASH      albuterol 108 (90 Base) MCG/ACT Inhalation Aero Soln Inhale 1-2 puffs into the lungs every 4 (four) hours as needed for Wheezing or Shortness of Breath. 1 each 0    Cholecalciferol (VITAMIN D) 50 MCG (2000 UT) Oral Tab Take by mouth.      Ascorbic Acid (VITAMIN C) 100 MG Oral Tab Take 1 tablet (100 mg total) by mouth daily.      Multiple Vitamins-Minerals (MULTI-VITAMIN/MINERALS) Oral Tab  Take 1 tablet by mouth daily.      BACLOFEN 20 MG Oral Tab TAKE 1 TABLET BY MOUTH TWICE DAILY OR THREE TIMES DAILY (Patient taking differently: Take 1 tablet (20 mg total) by mouth 2 (two) times daily. TAKE 1 TABLET BY MOUTH TWICE DAILY OR THREE TIMES DAILY) 270 tablet 3      Past Medical History:    Abdominal distention    Abdominal pain    Acute diverticulitis    Acute medial meniscus tear of left knee    Formatting of this note might be different from the original.  Added automatically from request for surgery [id number]    Arthritis    Asthma (HCC)    allergy induced asthma    Autoimmune disease (HCC)    transverse myelitis     Back pain    Bloating    Body piercing    Constipation    Decorative tattoo    Diabetes ([facility])    Diarrhea    Diarrhea, unspecified    Esophageal reflux    Essential hypertension    Fatigue    Flatulence/gas pain/belching    Food intolerance    Frequent urination    Gastroesophageal reflux disease without esophagitis    Heartburn    Heavy menses    High blood pressure    High cholesterol    no meds    History of adverse reaction to anesthesia    took longer to awaken and heart rate was a bit high    History of Clostridium difficile infection    7/2022    Hyperlipidemia    Indigestion    Irregular bowel habits    Leaking of urine    Medial epicondylitis of right elbow    Menses painful    Mild intermittent asthma without complication ([facility])    Muscle spasm    Nausea    Painful swallowing    PONV (postoperative nausea and vomiting)    Prediabetes    Problems with swallowing    PVC's (premature ventricular contractions)    Right lateral epicondylitis    Sleep disturbance    Splenic artery aneurysm    recent diagnosis    Stool incontinence    Transverse myelitis (HCC)    has residual neurologic symptoms, sees neuro    Uncomfortable fullness after meals    Urinary tract infection    Wears glasses      Social History:  Social History     Socioeconomic History    Marital status:    Tobacco Use     Smoking status: Never     Passive exposure: Never    Smokeless tobacco: Never   Vaping Use    Vaping status: Never Used   Substance and Sexual Activity    Alcohol use: Not Currently     Comment: Hurts Pt's stomach to consume alcohol    Drug use: No     Social Drivers of Health     Food Insecurity: No Food Insecurity (1/9/2025)    NCSS - Food Insecurity     Worried About Running Out of Food in the Last Year: No     Ran Out of Food in the Last Year: No   Transportation Needs: No Transportation Needs (1/9/2025)    NCSS - Transportation     Lack of Transportation: No   Housing Stability: Not At Risk (1/9/2025)    NCSS - Housing/Utilities     Has Housing: Yes     Worried About Losing Housing: No     Unable to Get Utilities: No     Family History   Problem Relation Age of Onset    Lipids Father     Other (Other) Father 86        myelodysplasia    Hypertension Mother     Other (Other) Maternal Uncle         maternal family hx of anurysm    Bleeding Disorders Maternal Grandfather     Other (Other) Brother         sounds like PAD in legs, had some complications from treatment    Diabetes Brother     Other (Other) Maternal Aunt         \"anuerysms in stomach\"    Bleeding Disorders Maternal Aunt         Aortic anerysum        Allergies  Allergies[1]    REVIEW OF SYSTEMS:   As per HPI all other systems are negative    EXAM:   GENERAL: well developed, well nourished,in no apparent distress. Pt is speaking in full sentences without any cognitive impairment. Further physical exam could not be performed due to interview being conducted over telemedicine medium      ASSESSMENT AND PLAN:     Encounter Diagnosis   Name Primary?    Type 2 diabetes mellitus without complication, without long-term current use of insulin (HCC) Yes       No orders of the defined types were placed in this encounter.      Meds & Refills for this Visit:  Requested Prescriptions     Signed Prescriptions Disp Refills    Tirzepatide (MOUNJARO) 2.5 MG/0.5ML  Subcutaneous Solution Auto-injector 2 mL 0     Sig: Inject 2.5 mg into the skin once a week.       Imaging & Consults:  None  Plan of care as per HPI    Follow up one month for weight check and for med SE evalaution    No follow-ups on file.  There are no Patient Instructions on file for this visit.      This note was created by [employer] voice recognition. Errors in content may be related to improper recognition by the system; efforts to review and correct have been done but errors may still exist. Please be advised the primary purpose of this note is for me to communicate medical care. Standard sentence structure is not always used. Medical terminology and medical abbreviations may be used. There may be grammatical, typographical, and automated fill ins that may have errors missed in proofreading.          [1]   Allergies  Allergen Reactions    Cephalosporins HIVES    Pcn [Bicillin C-R,] HIVES    Penicillins HIVES    Lisinopril Coughing     Side effect

## 2025-04-03 ENCOUNTER — LAB ENCOUNTER (OUTPATIENT)
Dept: LAB | Age: 60
End: 2025-04-03
Attending: INTERNAL MEDICINE
Payer: COMMERCIAL

## 2025-04-03 ENCOUNTER — LAB ENCOUNTER (OUTPATIENT)
Dept: LAB | Age: 60
End: 2025-04-03
Attending: FAMILY MEDICINE
Payer: COMMERCIAL

## 2025-04-03 DIAGNOSIS — I25.10 CORONARY ARTERY DISEASE DUE TO CALCIFIED CORONARY LESION: ICD-10-CM

## 2025-04-03 DIAGNOSIS — I25.84 CORONARY ARTERY DISEASE DUE TO CALCIFIED CORONARY LESION: ICD-10-CM

## 2025-04-03 DIAGNOSIS — E78.2 ELEVATED TRIGLYCERIDES WITH HIGH CHOLESTEROL: Primary | ICD-10-CM

## 2025-04-03 DIAGNOSIS — E11.9 TYPE 2 DIABETES MELLITUS WITHOUT COMPLICATION, WITHOUT LONG-TERM CURRENT USE OF INSULIN (HCC): ICD-10-CM

## 2025-04-03 LAB
CHOLEST SERPL-MCNC: 102 MG/DL (ref ?–200)
FASTING PATIENT LIPID ANSWER: YES
HDLC SERPL-MCNC: 36 MG/DL (ref 40–59)
LDLC SERPL CALC-MCNC: 45 MG/DL (ref ?–100)
NONHDLC SERPL-MCNC: 66 MG/DL (ref ?–130)
TRIGL SERPL-MCNC: 113 MG/DL (ref 30–149)
VLDLC SERPL CALC-MCNC: 16 MG/DL (ref 0–30)

## 2025-04-03 PROCEDURE — 36415 COLL VENOUS BLD VENIPUNCTURE: CPT

## 2025-04-03 PROCEDURE — 80061 LIPID PANEL: CPT

## 2025-04-08 ENCOUNTER — OFFICE VISIT (OUTPATIENT)
Dept: FAMILY MEDICINE CLINIC | Facility: CLINIC | Age: 60
End: 2025-04-08
Payer: COMMERCIAL

## 2025-04-08 VITALS
DIASTOLIC BLOOD PRESSURE: 68 MMHG | TEMPERATURE: 98 F | SYSTOLIC BLOOD PRESSURE: 118 MMHG | RESPIRATION RATE: 18 BRPM | WEIGHT: 171 LBS | BODY MASS INDEX: 29.19 KG/M2 | HEIGHT: 64 IN | OXYGEN SATURATION: 99 % | HEART RATE: 73 BPM

## 2025-04-08 DIAGNOSIS — E78.2 ELEVATED TRIGLYCERIDES WITH HIGH CHOLESTEROL: ICD-10-CM

## 2025-04-08 DIAGNOSIS — I25.84 CORONARY ARTERY DISEASE DUE TO CALCIFIED CORONARY LESION: ICD-10-CM

## 2025-04-08 DIAGNOSIS — I25.10 CORONARY ARTERY DISEASE DUE TO CALCIFIED CORONARY LESION: ICD-10-CM

## 2025-04-08 DIAGNOSIS — E11.9 TYPE 2 DIABETES MELLITUS WITHOUT COMPLICATION, WITHOUT LONG-TERM CURRENT USE OF INSULIN (HCC): ICD-10-CM

## 2025-04-08 LAB
ALBUMIN SERPL-MCNC: 5 G/DL (ref 3.2–4.8)
ALBUMIN/GLOB SERPL: 1.9 {RATIO} (ref 1–2)
ALP LIVER SERPL-CCNC: 95 U/L
ALT SERPL-CCNC: 33 U/L
ANION GAP SERPL CALC-SCNC: 7 MMOL/L (ref 0–18)
AST SERPL-CCNC: 25 U/L (ref ?–34)
BILIRUB SERPL-MCNC: 0.5 MG/DL (ref 0.3–1.2)
BUN BLD-MCNC: 9 MG/DL (ref 9–23)
CALCIUM BLD-MCNC: 10.4 MG/DL (ref 8.7–10.6)
CHLORIDE SERPL-SCNC: 104 MMOL/L (ref 98–112)
CO2 SERPL-SCNC: 30 MMOL/L (ref 21–32)
CREAT BLD-MCNC: 0.74 MG/DL
EGFRCR SERPLBLD CKD-EPI 2021: 94 ML/MIN/1.73M2 (ref 60–?)
EST. AVERAGE GLUCOSE BLD GHB EST-MCNC: 120 MG/DL (ref 68–126)
FASTING STATUS PATIENT QL REPORTED: NO
GLOBULIN PLAS-MCNC: 2.7 G/DL (ref 2–3.5)
GLUCOSE BLD-MCNC: 100 MG/DL (ref 70–99)
HBA1C MFR BLD: 5.8 % (ref ?–5.7)
OSMOLALITY SERPL CALC.SUM OF ELEC: 291 MOSM/KG (ref 275–295)
POTASSIUM SERPL-SCNC: 4.5 MMOL/L (ref 3.5–5.1)
PROT SERPL-MCNC: 7.7 G/DL (ref 5.7–8.2)
SODIUM SERPL-SCNC: 141 MMOL/L (ref 136–145)

## 2025-04-08 PROCEDURE — 83036 HEMOGLOBIN GLYCOSYLATED A1C: CPT | Performed by: FAMILY MEDICINE

## 2025-04-08 PROCEDURE — 99214 OFFICE O/P EST MOD 30 MIN: CPT | Performed by: FAMILY MEDICINE

## 2025-04-08 PROCEDURE — 3008F BODY MASS INDEX DOCD: CPT | Performed by: FAMILY MEDICINE

## 2025-04-08 PROCEDURE — 3061F NEG MICROALBUMINURIA REV: CPT | Performed by: FAMILY MEDICINE

## 2025-04-08 PROCEDURE — 3074F SYST BP LT 130 MM HG: CPT | Performed by: FAMILY MEDICINE

## 2025-04-08 PROCEDURE — 3078F DIAST BP <80 MM HG: CPT | Performed by: FAMILY MEDICINE

## 2025-04-08 PROCEDURE — 80053 COMPREHEN METABOLIC PANEL: CPT | Performed by: FAMILY MEDICINE

## 2025-04-08 RX ORDER — TIRZEPATIDE 2.5 MG/.5ML
2.5 INJECTION, SOLUTION SUBCUTANEOUS WEEKLY
Qty: 2 ML | Refills: 0 | Status: SHIPPED | OUTPATIENT
Start: 2025-04-08

## 2025-04-08 NOTE — PROGRESS NOTES
The following individual(s) verbally consented to be recorded using ambient AI listening technology and understand that they can each withdraw their consent to this listening technology at any point by asking the clinician to turn off or pause the recording:    Patient name: Candie Liu  Additional names:  none        Chief Complaint   Patient presents with    Weight Check    Medication Follow-Up       History of Present Illness  Candie Liu is a 60 year old female with diabetes who presents for a diabetic follow-up.    She is currently on Mounjaro 2.5 mg, administered weekly on Wednesdays, and has completed four injections, with the last one taken last Wednesday. She has experienced a weight loss of five pounds over the past four weeks since starting the medication. Mild side effects include constipation and heartburn, which she describes as manageable.    She has recently completed a lipid panel through Dr. Baptiste's office, showing improvement in all areas except for HDL, which remains low. She attributes the low HDL to a lack of aerobic exercise due to Achilles tendonitis. She is currently taking rosuvastatin, with a total daily dose of 30 mg, achieved by taking one 20 mg pill and half of another. She notes the inconvenience of cutting the pill, as it is not scored.    Achilles tendonitis has limited her ability to perform aerobic exercises. She has been undergoing physical therapy for the past month and is currently unable to perform aerobic exercises but is engaging in floor exercises to maintain some level of physical activity.      Past Medical History:    Abdominal distention    Abdominal pain    Acute diverticulitis    Acute medial meniscus tear of left knee    Formatting of this note might be different from the original.  Added automatically from request for surgery [id number]    Arthritis    Asthma (HCC)    allergy induced asthma    Autoimmune disease (HCC)    transverse myelitis      Back pain    Bloating    Body piercing    Constipation    Decorative tattoo    Diabetes (HCC)    Diarrhea    Diarrhea, unspecified    Esophageal reflux    Essential hypertension    Fatigue    Flatulence/gas pain/belching    Food intolerance    Frequent urination    Gastroesophageal reflux disease without esophagitis    Heartburn    Heavy menses    High blood pressure    High cholesterol    no meds    History of adverse reaction to anesthesia    took longer to awaken and heart rate was a bit high    History of Clostridium difficile infection    7/2022    Hyperlipidemia    Indigestion    Irregular bowel habits    Leaking of urine    Medial epicondylitis of right elbow    Menses painful    Mild intermittent asthma without complication (HCC)    Muscle spasm    Nausea    Painful swallowing    PONV (postoperative nausea and vomiting)    Prediabetes    Problems with swallowing    PVC's (premature ventricular contractions)    Right lateral epicondylitis    Sleep disturbance    Splenic artery aneurysm    recent diagnosis    Stool incontinence    Transverse myelitis (HCC)    has residual neurologic symptoms, sees neuro    Uncomfortable fullness after meals    Urinary tract infection    Wears glasses       Past Surgical History:   Procedure Laterality Date    Cholecystectomy      Colonoscopy N/A 12/18/2023    Procedure: COLONOSCOPY;  Surgeon: Zafar Johnson DO;  Location:  ENDOSCOPY    [facility]      Foot fracture surgery  11/2017    no surgery    Knee arthroscopy      left knee    Knee surgery      Other surgical history  2018    right elbow surgery       Social History     Socioeconomic History    Marital status:    Tobacco Use    Smoking status: Never     Passive exposure: Never    Smokeless tobacco: Never   Vaping Use    Vaping status: Never Used   Substance and Sexual Activity    Alcohol use: Not Currently     Comment: Hurts Pt's stomach to consume alcohol    Drug use: No       Family History   Problem Relation Age  of Onset    Lipids Father     Other (Other) Father 86        myelodysplasia    Hypertension Mother     Other (Other) Maternal Uncle         maternal family hx of anurysm    Bleeding Disorders Maternal Grandfather     Other (Other) Brother         sounds like PAD in legs, had some complications from treatment    Diabetes Brother     Other (Other) Maternal Aunt         \"anuerysms in stomach\"    Bleeding Disorders Maternal Aunt         Aortic anerysum        Medications Ordered Prior to Encounter[1]      Objective  Vitals:    04/08/25 0934   BP: 118/68   Pulse: 73   Resp: 18   Temp: 97.9 °F (36.6 °C)   TempSrc: Temporal   SpO2: 99%   Weight: 171 lb (77.6 kg)   Height: 5' 4\" (1.626 m)         Body mass index is 29.35 kg/m².    Physical Exam  Constitutional:       Appearance: Normal appearance.   HEENT:      Head: Normocephalic and atraumatic.      Eyes: PERRLA no notable nystagmus     Ears: normal on observation     Nose: Nose normal.      Mouth: Mucous membranes are moist.      Neck: no masses no bruit  Cardiovascular:      Rate and Rhythm: Normal rate and regular rhythm.   Pulmonary:      Effort: Pulmonary effort is normal.      Breath sounds: Normal breath sounds. .   Musculoskeletal:         General: Normal range of motion.      Cervical back: Normal range of motion.   Skin:     General: Skin is warm and dry.   Neurological:      General: No focal deficit present.      Mental Status: She is alert and oriented to person, place, and time.   Psychiatric:         Mood and Affect: Mood normal.         Thought Content: Thought content normal.       Assessment and Plan  Assessment & Plan  Type 2 diabetes mellitus without complication  Positive response to Tirzepatide with weight loss. Mild side effects managed with diet. Current dose maintained to achieve BMI goal and reduce osteoporosis risk.  - Continue Tirzepatide (Mounjaro) 2.5 mg weekly for another month.  - Advise increasing fiber intake with Benefiber in the morning  and prunes at night.  - Encourage hydration with 64 ounces of water daily.  - Reassess weight and BMI in one month.    Hyperlipidemia  Improved lipid panel with decreased LDL. HDL remains low. Current Rosuvastatin dose effective, potential reduction considered after next lipid panel.  - Continue Rosuvastatin 30 mg daily.  - Repeat lipid panel in three months to reassess and consider dose adjustment to 20 mg.    Achilles tendonitis  Condition limits aerobic exercise. Physical therapy ongoing.  - Continue physical therapy for Achilles tendonitis.  - Encourage performing slower exercises quickly to increase heart rate.    1. Type 2 diabetes mellitus without complication, without long-term current use of insulin (HCC)  - Tirzepatide (MOUNJARO) 2.5 MG/0.5ML Subcutaneous Solution Auto-injector; Inject 2.5 mg into the skin once a week.  Dispense: 2 mL; Refill: 0  - Comp Metabolic Panel (14)  - Hemoglobin A1C    2. Elevated triglycerides with high cholesterol  - Comp Metabolic Panel (14)  - Hemoglobin A1C    3. Coronary artery disease due to calcified coronary lesion  - Comp Metabolic Panel (14)  - Hemoglobin A1C         Follow up  No follow-ups on file.      Patient Instructions  There are no Patient Instructions on file for this visit.       Roxanne Mckinnon MD       This note was created by [employer] voice recognition. Errors in content may be related to improper recognition by the system; efforts to review and correct have been done but errors may still exist. Please be advised the primary purpose of this note is for me to communicate medical care. Standard sentence structure is not always used. Medical terminology and medical abbreviations may be used. There may be grammatical, typographical, and automated fill ins that may have errors missed in proofreading.        [1]   Current Outpatient Medications on File Prior to Visit   Medication Sig Dispense Refill    PSYLLIUM HUSK OR Take 500 mg by mouth once.       rosuvastatin 20 MG Oral Tab Take 1.5 tablets (30 mg total) by mouth nightly. 45 tablet 2    METOPROLOL SUCCINATE ER 50 MG Oral Tablet 24 Hr TAKE 1 TABLET(50 MG) BY MOUTH DAILY 90 tablet 0    Blood Glucose Monitoring Suppl w/Device Does not apply Kit Check blood glucose once daily 1 kit 0    Fingerstix Lancets Does not apply Misc Check blood glucose once daily 108 each 1    Glucose Blood (ONETOUCH ULTRA) In Vitro Strip Test blood glucose once daily 125 each 1    dicyclomine 20 MG Oral Tab Take 1 tablet (20 mg total) by mouth every 4 (four) hours as needed.      Rhubarb (ESTROVEN MENOPAUSE RELIEF) 4 MG Oral Tab Estroven Complete Menopause Relief 4 mg tablet, [RxNorm: 0]      albuterol 108 (90 Base) MCG/ACT Inhalation Aero Soln Inhale 1-2 puffs into the lungs every 4 (four) hours as needed for Wheezing or Shortness of Breath. 1 each 0    Cholecalciferol (VITAMIN D) 50 MCG (2000 UT) Oral Tab Take by mouth.      Ascorbic Acid (VITAMIN C) 100 MG Oral Tab Take 1 tablet (100 mg total) by mouth daily.      Multiple Vitamins-Minerals (MULTI-VITAMIN/MINERALS) Oral Tab Take 1 tablet by mouth daily.      BACLOFEN 20 MG Oral Tab TAKE 1 TABLET BY MOUTH TWICE DAILY OR THREE TIMES DAILY (Patient taking differently: Take 1 tablet (20 mg total) by mouth 2 (two) times daily. TAKE 1 TABLET BY MOUTH TWICE DAILY OR THREE TIMES DAILY) 270 tablet 3     No current facility-administered medications on file prior to visit.

## 2025-04-29 ENCOUNTER — PATIENT MESSAGE (OUTPATIENT)
Dept: FAMILY MEDICINE CLINIC | Facility: CLINIC | Age: 60
End: 2025-04-29

## 2025-04-30 RX ORDER — TIRZEPATIDE 5 MG/.5ML
5 INJECTION, SOLUTION SUBCUTANEOUS WEEKLY
Qty: 6 ML | Refills: 0 | Status: SHIPPED | OUTPATIENT
Start: 2025-04-30

## 2025-05-01 ENCOUNTER — PATIENT MESSAGE (OUTPATIENT)
Dept: RHEUMATOLOGY | Facility: CLINIC | Age: 60
End: 2025-05-01

## 2025-05-01 DIAGNOSIS — M25.50 POLYARTHRALGIA: Primary | ICD-10-CM

## 2025-05-01 DIAGNOSIS — M19.90 INFLAMMATORY ARTHRITIS: ICD-10-CM

## 2025-05-01 DIAGNOSIS — R79.82 ELEVATED C-REACTIVE PROTEIN (CRP): ICD-10-CM

## 2025-05-07 ENCOUNTER — LAB ENCOUNTER (OUTPATIENT)
Dept: LAB | Age: 60
End: 2025-05-07
Attending: INTERNAL MEDICINE
Payer: COMMERCIAL

## 2025-05-07 DIAGNOSIS — M19.90 INFLAMMATORY ARTHRITIS: ICD-10-CM

## 2025-05-07 DIAGNOSIS — R79.82 ELEVATED C-REACTIVE PROTEIN (CRP): ICD-10-CM

## 2025-05-07 DIAGNOSIS — M25.50 POLYARTHRALGIA: ICD-10-CM

## 2025-05-07 LAB
CRP SERPL-MCNC: <0.4 MG/DL (ref ?–0.5)
ERYTHROCYTE [SEDIMENTATION RATE] IN BLOOD: 8 MM/HR (ref 0–30)
RHEUMATOID FACT SERPL-ACNC: <3.5 IU/ML (ref ?–14)

## 2025-05-07 PROCEDURE — 86225 DNA ANTIBODY NATIVE: CPT | Performed by: INTERNAL MEDICINE

## 2025-05-07 PROCEDURE — 86431 RHEUMATOID FACTOR QUANT: CPT | Performed by: INTERNAL MEDICINE

## 2025-05-07 PROCEDURE — 85652 RBC SED RATE AUTOMATED: CPT | Performed by: INTERNAL MEDICINE

## 2025-05-07 PROCEDURE — 86235 NUCLEAR ANTIGEN ANTIBODY: CPT | Performed by: INTERNAL MEDICINE

## 2025-05-07 PROCEDURE — 86039 ANTINUCLEAR ANTIBODIES (ANA): CPT | Performed by: INTERNAL MEDICINE

## 2025-05-07 PROCEDURE — 86140 C-REACTIVE PROTEIN: CPT | Performed by: INTERNAL MEDICINE

## 2025-05-07 PROCEDURE — 86200 CCP ANTIBODY: CPT | Performed by: INTERNAL MEDICINE

## 2025-05-07 PROCEDURE — 86038 ANTINUCLEAR ANTIBODIES: CPT | Performed by: INTERNAL MEDICINE

## 2025-05-09 LAB
ANA NUCLEOLAR TITR SER IF: 80 {TITER}
DSDNA AB TITR SER: <10 {TITER}
NUCLEAR IGG TITR SER IF: POSITIVE {TITER}

## 2025-05-14 LAB
CCP IGG SERPL-ACNC: 1 U/ML (ref 0–6.9)
CENTROMERE IGG SER-ACNC: <0.4 U/ML (ref ?–7)
ENA JO1 AB SER IA-ACNC: <0.3 U/ML (ref ?–7)
ENA RNP IGG SER IA-ACNC: 0.6 U/ML (ref ?–7)
ENA SCL70 IGG SER IA-ACNC: 0.8 U/ML (ref ?–7)
ENA SM IGG SER IA-ACNC: <0.7 U/ML (ref ?–7)
ENA SS-A IGG SER IA-ACNC: <0.4 U/ML (ref ?–7)
ENA SS-B IGG SER IA-ACNC: <0.4 U/ML (ref ?–7)
U1 SNRNP IGG SER IA-ACNC: 1.3 U/ML (ref ?–5)

## 2025-05-28 DIAGNOSIS — I25.10 CORONARY ARTERY DISEASE DUE TO CALCIFIED CORONARY LESION: ICD-10-CM

## 2025-05-28 DIAGNOSIS — I25.84 CORONARY ARTERY DISEASE DUE TO CALCIFIED CORONARY LESION: ICD-10-CM

## 2025-05-28 RX ORDER — ROSUVASTATIN CALCIUM 20 MG/1
30 TABLET, COATED ORAL NIGHTLY
Qty: 45 TABLET | Refills: 2 | Status: SHIPPED | OUTPATIENT
Start: 2025-05-28

## 2025-05-28 NOTE — TELEPHONE ENCOUNTER
Cholesterol Medication Protocol [id number]/28/2025 01:04 PM   Protocol Details ALT < 80    ALT resulted within past year    Lipid panel within past 12 months    In person appointment or virtual visit in the past 12 mos or appointment in next 3 mos    Medication is active on med list

## 2025-06-11 ENCOUNTER — OFFICE VISIT (OUTPATIENT)
Dept: RHEUMATOLOGY | Facility: CLINIC | Age: 60
End: 2025-06-11
Payer: COMMERCIAL

## 2025-06-11 ENCOUNTER — LAB ENCOUNTER (OUTPATIENT)
Dept: LAB | Age: 60
End: 2025-06-11
Attending: INTERNAL MEDICINE
Payer: COMMERCIAL

## 2025-06-11 VITALS
OXYGEN SATURATION: 98 % | SYSTOLIC BLOOD PRESSURE: 132 MMHG | BODY MASS INDEX: 28 KG/M2 | TEMPERATURE: 98 F | DIASTOLIC BLOOD PRESSURE: 60 MMHG | HEART RATE: 84 BPM | HEIGHT: 64 IN | WEIGHT: 164 LBS | RESPIRATION RATE: 16 BRPM

## 2025-06-11 DIAGNOSIS — M19.90 INFLAMMATORY ARTHRITIS: Primary | ICD-10-CM

## 2025-06-11 DIAGNOSIS — Z79.899 HIGH RISK MEDICATION USE: ICD-10-CM

## 2025-06-11 DIAGNOSIS — M19.90 INFLAMMATORY ARTHRITIS: ICD-10-CM

## 2025-06-11 DIAGNOSIS — R53.82 CHRONIC FATIGUE: ICD-10-CM

## 2025-06-11 DIAGNOSIS — M08.80 ENTHESITIS RELATED ARTHRITIS (HCC): ICD-10-CM

## 2025-06-11 DIAGNOSIS — Z84.0 FAMILY HISTORY OF PSORIASIS IN MOTHER: ICD-10-CM

## 2025-06-11 DIAGNOSIS — M25.50 POLYARTHRALGIA: ICD-10-CM

## 2025-06-11 DIAGNOSIS — M11.20 CHONDROCALCINOSIS: ICD-10-CM

## 2025-06-11 LAB
ALBUMIN SERPL-MCNC: 4.6 G/DL (ref 3.2–4.8)
ALBUMIN/GLOB SERPL: 1.8 {RATIO} (ref 1–2)
ALP LIVER SERPL-CCNC: 85 U/L (ref 46–118)
ALT SERPL-CCNC: 33 U/L (ref 10–49)
ANION GAP SERPL CALC-SCNC: 9 MMOL/L (ref 0–18)
AST SERPL-CCNC: 27 U/L (ref ?–34)
BASOPHILS # BLD AUTO: 0.06 X10(3) UL (ref 0–0.2)
BASOPHILS NFR BLD AUTO: 0.7 %
BILIRUB SERPL-MCNC: 0.5 MG/DL (ref 0.3–1.2)
BUN BLD-MCNC: 9 MG/DL (ref 9–23)
CALCIUM BLD-MCNC: 9.8 MG/DL (ref 8.7–10.6)
CHLORIDE SERPL-SCNC: 105 MMOL/L (ref 98–112)
CO2 SERPL-SCNC: 27 MMOL/L (ref 21–32)
CREAT BLD-MCNC: 0.8 MG/DL (ref 0.55–1.02)
CRP SERPL-MCNC: <0.4 MG/DL (ref ?–0.5)
EGFRCR SERPLBLD CKD-EPI 2021: 85 ML/MIN/1.73M2 (ref 60–?)
EOSINOPHIL # BLD AUTO: 0.36 X10(3) UL (ref 0–0.7)
EOSINOPHIL NFR BLD AUTO: 4 %
ERYTHROCYTE [DISTWIDTH] IN BLOOD BY AUTOMATED COUNT: 12.6 %
ERYTHROCYTE [SEDIMENTATION RATE] IN BLOOD: 6 MM/HR (ref 0–30)
FASTING STATUS PATIENT QL REPORTED: NO
GLOBULIN PLAS-MCNC: 2.5 G/DL (ref 2–3.5)
GLUCOSE BLD-MCNC: 121 MG/DL (ref 70–99)
HCT VFR BLD AUTO: 42.9 % (ref 35–48)
HGB BLD-MCNC: 14.1 G/DL (ref 12–16)
IMM GRANULOCYTES # BLD AUTO: 0.02 X10(3) UL (ref 0–1)
IMM GRANULOCYTES NFR BLD: 0.2 %
LYMPHOCYTES # BLD AUTO: 2.02 X10(3) UL (ref 1–4)
LYMPHOCYTES NFR BLD AUTO: 22.4 %
MCH RBC QN AUTO: 29.2 PG (ref 26–34)
MCHC RBC AUTO-ENTMCNC: 32.9 G/DL (ref 31–37)
MCV RBC AUTO: 88.8 FL (ref 80–100)
MONOCYTES # BLD AUTO: 0.65 X10(3) UL (ref 0.1–1)
MONOCYTES NFR BLD AUTO: 7.2 %
NEUTROPHILS # BLD AUTO: 5.89 X10 (3) UL (ref 1.5–7.7)
NEUTROPHILS # BLD AUTO: 5.89 X10(3) UL (ref 1.5–7.7)
NEUTROPHILS NFR BLD AUTO: 65.5 %
OSMOLALITY SERPL CALC.SUM OF ELEC: 292 MOSM/KG (ref 275–295)
PLATELET # BLD AUTO: 262 10(3)UL (ref 150–450)
POTASSIUM SERPL-SCNC: 3.8 MMOL/L (ref 3.5–5.1)
PROT SERPL-MCNC: 7.1 G/DL (ref 5.7–8.2)
RBC # BLD AUTO: 4.83 X10(6)UL (ref 3.8–5.3)
SODIUM SERPL-SCNC: 141 MMOL/L (ref 136–145)
WBC # BLD AUTO: 9 X10(3) UL (ref 4–11)

## 2025-06-11 PROCEDURE — 85652 RBC SED RATE AUTOMATED: CPT | Performed by: INTERNAL MEDICINE

## 2025-06-11 PROCEDURE — 3075F SYST BP GE 130 - 139MM HG: CPT | Performed by: INTERNAL MEDICINE

## 2025-06-11 PROCEDURE — 82955 ASSAY OF G6PD ENZYME: CPT | Performed by: INTERNAL MEDICINE

## 2025-06-11 PROCEDURE — 80053 COMPREHEN METABOLIC PANEL: CPT | Performed by: INTERNAL MEDICINE

## 2025-06-11 PROCEDURE — 99213 OFFICE O/P EST LOW 20 MIN: CPT | Performed by: INTERNAL MEDICINE

## 2025-06-11 PROCEDURE — 3008F BODY MASS INDEX DOCD: CPT | Performed by: INTERNAL MEDICINE

## 2025-06-11 PROCEDURE — 86140 C-REACTIVE PROTEIN: CPT | Performed by: INTERNAL MEDICINE

## 2025-06-11 PROCEDURE — 85025 COMPLETE CBC W/AUTO DIFF WBC: CPT | Performed by: INTERNAL MEDICINE

## 2025-06-11 PROCEDURE — 3078F DIAST BP <80 MM HG: CPT | Performed by: INTERNAL MEDICINE

## 2025-06-11 RX ORDER — SULFASALAZINE 500 MG/1
500 TABLET ORAL 2 TIMES DAILY
Qty: 180 TABLET | Refills: 0 | Status: SHIPPED | OUTPATIENT
Start: 2025-06-11

## 2025-06-11 NOTE — PROGRESS NOTES
?  RHEUMATOLOGY FOLLOW UP   Date of visit: 06/11/2025  ?  Chief Complaint   Patient presents with    Follow - Up     8 month f/u. Still having foot pain. Has seen podiatry and did imaging. Converted rapid score of 1.7     ?  ASSESSMENT, DISCUSSION & PLAN   Assessment:  1. Inflammatory arthritis    2. Enthesitis related arthritis (HCC)    3. Polyarthralgia    4. Family history of psoriasis in mother    5. Chondrocalcinosis    6. Chronic fatigue    7. High risk medication use        Discussion:  Ms. Candie Liu is a 59 yo woman with a complicated past medical history (transverse myelitis with some residual leg weakness and tingling; splenic artery aneurysm, prior c diff infection) who presented for evaluation of recurrent left knee pain/swelling. She had the knee aspirated in September and while no crystals seen, xrays showed chondrocalcinosis suggestive of CPPD arthritis.   Her autoimmune workup was grossly negative (HLAB27, MARY JO/BOYD, RF,CCP) with exception of persistent elevated CRP. Discussed that this is nonspecific and she should still be sure she is up to date with cancer screening and screening for cardiovascular disease.- she has plans for cscope later this year.     Previously, she tried 7 days total of colchicine without any improvement of symptoms. However, the last steroid taper really helped symptoms.  Last year, she underwent left knee arthroscopy and has been recovering slowly, starting to be more active.   Previously, she had right elbow pain without swelling but that radiated down into the index finger.  Some left elbow pain and significant left knee pain and swelling.  She was previously told there was chondrocalcinosis there and treated as if pseudogout flare.    She has a family history of psoriatic arthritis, so she is being monitored closely for this as well.   Since her last visit, she has been dealing with worsening bilateral ankle pain.  She recently had MRI showing significant right  plantar fasciitis as well as left Achilles tendinitis.  She is following with podiatry.  Had steroid shot for the right plantar fascia but feels like that worsened symptoms prior to the MRI showing the tear.  Was wearing a boot but does not feel like it helped.  At this point, she is open to other interventions.  Will repeat her ESR/CRP  along with checking G6PD levels.  Will likely start sulfasalazine and see how she does.  Reminded that she will need labs every 4 weeks to monitor for toxicity from the medicine.  Ideally would follow-up in another 3 to 4 months.  We will at least be in communication with test results.    According to the ACR, sulfasalazine belongs to a class of drugs called sulfa drugs and is used in the treatment of rheumatoid arthritis and some other autoimmune conditions. It is a combination of salicylate (the main ingredient in aspirin) and a sulfa antibiotic. Sulfasalazine is also known as a disease modifying antirheumatic drug (DMARD), because it not only decreases the pain and swelling of inflammatory arthritis, but may also prevent damage to joints. In addition, it may reduce the risk of long term loss of function. It comes in a 500mg tablet and should be taken with food and a full glass of water to avoid an upset stomach. It usually takes between 2-3 months to notice any improvement of symptoms after starting the medication.  In general, most patients can take sulfasalazine with few side effects. The most common side effects are nausea and abdominal discomfort, which often occurs in up to a third of patients early in the course of treatment. Serious side effects, such as stomach ulcers, are actually less common with sulfasalazine than with NSAIDs.  Abdominal side effects that do occur with sulfasalazine usually improve with time, and are often avoided by slowly increasing from a low starting dose. Only about 10% of patients on this medicine will get a skin rash or headache. Even less  commonly, patients taking this medication for RA will get mouth sores, itching, liver function abnormalities, or lung problems (rare). Burning or skin damage from sunlight can also be a problem. Those on sulfasalazine should use sunscreen (SPF 15 or higher) when outdoors and avoid prolonged exposure to sunlight. Some people will develop orange colored urine and even orange skin. This should not cause alarm. It is usually harmless and goes away after medication is stopped. In some cases, sulfasalazine may reduce the number of disease-fighting white blood cells in the body, leading to a higher risk for infections. This often does not cause symptoms, but can be detected by regular blood tests performed by your doctor. Sulfasalazine also increases the risk of reduced blood counts in people born with deficiency of an enzyme called Glucose-6-phosphate dehydrogenase.  After discussing above, patient agrees to try sulfasalazine and will notify us if any side effects.    Patient verbalized understanding of above instructions. No further questions at this time.    Code selection for this visit was based on time spent (22min) on date of service in preparing to see the patient, obtaining and/or reviewing separately obtained history, performing a medically appropriate examination, counseling and educating the patient/family/caregiver, ordering medications or testing, referring and communicating with other healthcare providers, documenting clinical information in the E HR, independently interpreting results and communicating results to the patient/family/caregiver and care coordination with the patient's other providers.    ?  Plan:  Diagnoses and all orders for this visit:    Inflammatory arthritis  -     G-6-Pd, Quant, Blood And Rbc; Future  -     sulfaSALAzine 500 MG Oral Tab; Take 1 tablet (500 mg total) by mouth 2 (two) times daily.  -     CBC With Differential With Platelet; Standing  -     Comp Metabolic Panel (14);  Standing  -     C-Reactive Protein; Standing  -     Sed Rate, Westergren (Automated); Standing    Enthesitis related arthritis (HCC)  -     G-6-Pd, Quant, Blood And Rbc; Future  -     sulfaSALAzine 500 MG Oral Tab; Take 1 tablet (500 mg total) by mouth 2 (two) times daily.  -     CBC With Differential With Platelet; Standing  -     Comp Metabolic Panel (14); Standing  -     C-Reactive Protein; Standing  -     Sed Rate, Westergren (Automated); Standing    Polyarthralgia    Family history of psoriasis in mother    Chondrocalcinosis    Chronic fatigue    High risk medication use  -     CBC With Differential With Platelet; Standing  -     Comp Metabolic Panel (14); Standing  -     C-Reactive Protein; Standing  -     Sed Rate, Westergren (Automated); Standing          Return in about 4 months (around 10/11/2025).  ?  HPI   Candie Liu is a 60 year old adult with the following active problems who was seen initially for evaluation of joint pain and elevated CRP. She presents for follow up today    Since her last visit, she has been doing okay.  Has been seen by podiatry- dx with right plantar fasciitis (severe with tear) and left achilles tendonitis. Is in wearing a boot for the right foot until recently  Still having pain b/l.   Is holding off on surgery or PRP. Doesn't feel like prior   Still with some left elbow issues- had tendonitis previously  Also having pain in the finger in the middle of the night- can feel \"asleep\" and aching  Right knee pain but thinks related to wearing the boot    Has been able to lose 12# since being on mounjaro. Still on lower dose. Trying injection in the thigh and had less nausea.   Hx of diverticulitis- followed with GI. Did not recommend repeat scope.     No recent chester's under bra- typically flares in summer/sweats  Some increased fatigue lately  Denies oral or nasal ulcers   + dry eyes, attributes to prior lasik sx and allergies. Uses refresh and xaditor.     Previously:    Was seen by cardiologist (had HTN, abnormal calcium score and started on statin by PCP)- told okay to follow up prn. Does feel like she is getting sun sensitivity with the metoprolol. Having worsened itchy bumps on arms/legs. Was also on antibiotc at that time.   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 Has appt in July HPI from initial consultation  referred for rheumatologic evaluation due to joint pain.     Has had a total of 3 separate occasions of left knee pain and swelling.   In July, had symptoms but xrays were negative was given NSAID and rest and she felt better.   In September, she had another bout but the NSAIDs and rest were insufficient. Initially went to immediate care and since she had elevated WBC, recommended ER visit. While in the ER, had it drained and discharged home. Was seen by orthopedic and recommended rheumatology evaluation.     Since September, she has been doing well overall until the past two days. She is starting to get some stiffness and swelling.   Denies any prior infections leading up to the flares.   Denies injury but admits to walking more prior to the Sept flare.     Does get elbow pain, would previously get recurrent right elbow tendonitis. 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 Did have surgery in May 2019 and noted to have some fraying. Since that time, pain is better overall.  Can get some left elbow pain.  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  Can get some right hip pain that can bother her at night, needing to switch sides  Can get some tightness in her fingers and stiffness without swelling.     + hx of transverse myelitis- 3 months after birth of daughter in 1997, her right leg had some tingling and sensation of leg being asleep and also had some low back pain. Then progressed where she couldn't walk and developed paralysis from breast down. Could not figure out cause of TM. Unclear  if connected to epidural for delivery. Still suffers from chronic leg cramping and tingling. Also feels weakness in the legs. Follows with neurology yearly.   + hx of splenic artery aneurysm found incidentally when getting worked up for bowel issues- seen on abdominal plain films. Follows with vascular surgeon.   + hx of c diff infection, unclear trigger but symptoms have improved     + reflux/GERD previously on omeprazole. Can get sensation of food getting stuck. Takes pepcid prn but takes TUMS more  + hx of IBS- can alternate constipation/diarrhea worsened with stress. Never blood in stools. Prior mucous subsided.   + hx of yearly bronchitis/sinus infection   + currently going through menopause and fibroids along with continued menstrual periods despite hormone replacement. Follows with gyne for pelvic US.   + ace induced cough, improved since stopping lisinopril   + hx of fatty liver per imaging and pt does have elevated cholesterol   + dry eyes post-lasic and allergies   + plantar fasciitis, hx of traumatic fall and fx of the left heel requiring boot. Follows with podiatry- uses inserts/orthotics   + daily headaches, minor, thinks related to new BP meds    No history of significant morning stiffness or new skin nodule formation.  The patient denies hair loss, oral or nasal ulcers, photosensitive rash, elevated or scarring rashes, Raynaud's phenomenon, prior renal disease, or history of seizures.  No history of prior blood clot in the legs or lungs, strokes or ischemic phenomenon  Denies nonhealing ulcers on the fingertips.   The patient denies any history of uveitis, nodular painful shin bruises, Achilles heel pain, psoriatic lesions, spooning or pitting of the nails, or history of dactylitis.  There are no symptoms of severe dry mouth, recurrent cavities, or swelling of the cheeks or under the jawbone.   No fevers, chills, lymphadenopathy, night sweats, unexpected weight loss, easy bruising or bleeding.  Denies  epistaxis.  Denies chronic cough or hemoptysis.     Family hx:  Multiple maternal family hx of aneurysms (most aortic)         Past Medical History:  Past Medical History:    Abdominal distention    Abdominal pain    Acute diverticulitis    Acute medial meniscus tear of left knee    Formatting of this note might be different from the original.  Added automatically from request for surgery [id number]    Arthritis    Asthma (HCC)    allergy induced asthma    Autoimmune disease (HCC)    transverse myelitis     Back pain    Bloating    Body piercing    Constipation    Decorative tattoo    Diabetes (HCC)    Diarrhea    Diarrhea, unspecified    Esophageal reflux    Essential hypertension    Fatigue    Flatulence/gas pain/belching    Food intolerance    Frequent urination    Gastroesophageal reflux disease without esophagitis    Heartburn    Heavy menses    High blood pressure    High cholesterol    no meds    History of adverse reaction to anesthesia    took longer to awaken and heart rate was a bit high    History of Clostridium difficile infection    7/2022    Hyperlipidemia    Indigestion    Irregular bowel habits    Leaking of urine    Medial epicondylitis of right elbow    Menses painful    Mild intermittent asthma without complication (HCC)    Muscle spasm    Nausea    Painful swallowing    PONV (postoperative nausea and vomiting)    Prediabetes    Problems with swallowing    PVC's (premature ventricular contractions)    Right lateral epicondylitis    Sleep disturbance    Splenic artery aneurysm    recent diagnosis    Stool incontinence    Transverse myelitis (HCC)    has residual neurologic symptoms, sees neuro    Uncomfortable fullness after meals    Urinary tract infection    Wears glasses     Past Surgical History:  Past Surgical History:   Procedure Laterality Date    Cholecystectomy      Colonoscopy N/A 12/18/2023    Procedure: COLONOSCOPY;  Surgeon: Zafar Johnson DO;  Location:  ENDOSCOPY    [facility]      Foot  fracture surgery  11/2017    no surgery    Knee arthroscopy      left knee    Knee surgery      Other surgical history  2018    right elbow surgery     Family History:  Family History   Problem Relation Age of Onset    Lipids Father     Other (Other) Father 86        myelodysplasia    Hypertension Mother     Other (Other) Maternal Uncle         maternal family hx of anurysm    Bleeding Disorders Maternal Grandfather     Other (Other) Brother         sounds like PAD in legs, had some complications from treatment    Diabetes Brother     Other (Other) Maternal Aunt         \"anuerysms in stomach\"    Bleeding Disorders Maternal Aunt         Aortic anerysum     Social History:  Social History     Socioeconomic History    Marital status:    Tobacco Use    Smoking status: Never     Passive exposure: Never    Smokeless tobacco: Never   Vaping Use    Vaping status: Never Used   Substance and Sexual Activity    Alcohol use: Not Currently     Comment: Hurts Pt's stomach to consume alcohol    Drug use: No     Social Drivers of Health     Food Insecurity: No Food Insecurity (1/9/2025)    NCSS - Food Insecurity     Worried About Running Out of Food in the Last Year: No     Ran Out of Food in the Last Year: No   Transportation Needs: No Transportation Needs (1/9/2025)    NCSS - Transportation     Lack of Transportation: No   Housing Stability: Not At Risk (1/9/2025)    NCSS - Housing/Utilities     Has Housing: Yes     Worried About Losing Housing: No     Unable to Get Utilities: No     Medications:  Outpatient Medications Marked as Taking for the 6/11/25 encounter (Office Visit) with Brenda Chapin, DO   Medication Sig Dispense Refill    sulfaSALAzine 500 MG Oral Tab Take 1 tablet (500 mg total) by mouth 2 (two) times daily. 180 tablet 0    ROSUVASTATIN 20 MG Oral Tab TAKE 1 AND 1/2 TABLETS(30 MG) BY MOUTH EVERY NIGHT 45 tablet 2    Tirzepatide (MOUNJARO) 5 MG/0.5ML Subcutaneous Solution Auto-injector Inject 5 mg into the  skin once a week. 6 mL 0    PSYLLIUM HUSK OR Take 500 mg by mouth once.      METOPROLOL SUCCINATE ER 50 MG Oral Tablet 24 Hr TAKE 1 TABLET(50 MG) BY MOUTH DAILY 90 tablet 0    Rhubarb (ESTROVEN MENOPAUSE RELIEF) 4 MG Oral Tab Estroven Complete Menopause Relief 4 mg tablet, [RxNorm: 0]      albuterol 108 (90 Base) MCG/ACT Inhalation Aero Soln Inhale 1-2 puffs into the lungs every 4 (four) hours as needed for Wheezing or Shortness of Breath. 1 each 0    Cholecalciferol (VITAMIN D) 50 MCG (2000 UT) Oral Tab Take by mouth.      Ascorbic Acid (VITAMIN C) 100 MG Oral Tab Take 1 tablet (100 mg total) by mouth daily.      Multiple Vitamins-Minerals (MULTI-VITAMIN/MINERALS) Oral Tab Take 1 tablet by mouth daily.      BACLOFEN 20 MG Oral Tab TAKE 1 TABLET BY MOUTH TWICE DAILY OR THREE TIMES DAILY (Patient taking differently: Take 1 tablet (20 mg total) by mouth in the morning and 1 tablet (20 mg total) before bedtime. TAKE 1 TABLET BY MOUTH TWICE DAILY OR THREE TIMES DAILY.) 270 tablet 3     Modified Medications    No medications on file     Medications Discontinued During This Encounter   Medication Reason    dicyclomine 20 MG Oral Tab     Tirzepatide (MOUNJARO) 2.5 MG/0.5ML Subcutaneous Solution Auto-injector        ?  ?  Allergies:  Allergies   Allergen Reactions    Cephalosporins HIVES    Pcn [Bicillin C-R,] HIVES    Penicillins HIVES    Lisinopril Coughing     Side effect      ?  REVIEW OF SYSTEMS   ?  Review of Systems   Constitutional:  Positive for malaise/fatigue. Negative for chills, fever and weight loss.   Eyes:  Negative for pain and redness.   Respiratory:  Negative for cough, shortness of breath and wheezing (with asthma/bronchitis).    Cardiovascular:  Negative for chest pain, palpitations and leg swelling.   Gastrointestinal:  Positive for abdominal pain, constipation, diarrhea and heartburn. Negative for blood in stool and nausea.   Genitourinary:  Positive for frequency and urgency. Negative for dysuria and  hematuria.   Musculoskeletal:  Negative for back pain, joint pain, myalgias and neck pain.   Skin:  Negative for itching and rash.   Neurological:  Negative for dizziness, tingling, weakness and headaches.   Endo/Heme/Allergies:  Positive for environmental allergies. Bruises/bleeds easily.     PHYSICAL EXAM   Today's Vitals:  Temperature Blood Pressure Heart Rate Resp Rate SpO2   Temp: 97.8 °F (36.6 °C) BP: 132/60 Pulse: 84 Resp: 16 SpO2: 98 %   ?  Current Weight Height BMI BSA Pain   Wt Readings from Last 1 Encounters:   06/11/25 164 lb (74.4 kg)    Height: 5' 4\" (162.6 cm) Body mass index is 28.15 kg/m². Body surface area is 1.8 meters squared.         Physical Exam  Vitals and nursing note reviewed.   Constitutional:       General: She is not in acute distress.     Appearance: Normal appearance. She is well-developed. She is not diaphoretic.   HENT:      Head: Normocephalic.   Eyes:      General: No scleral icterus.     Extraocular Movements: Extraocular movements intact.      Conjunctiva/sclera: Conjunctivae normal.   Neck:      Vascular: No JVD.      Trachea: No tracheal deviation.   Cardiovascular:      Rate and Rhythm: Normal rate and regular rhythm.      Heart sounds: Normal heart sounds. No murmur heard.  Pulmonary:      Effort: Pulmonary effort is normal. No respiratory distress.      Breath sounds: Normal breath sounds. No wheezing.   Musculoskeletal:         General: Tenderness present. No swelling or deformity.      Cervical back: Neck supple.      Comments: Early oa changes of fingers without synovitis or tenderness   Tenderness across pips. - improved   No swelling, tenderness, redness or restriction of motion of the DIPs, MCPs, L wrist, L elbow, or joints of the feet.  Bilateral shoulders with full ROM, no evidence of impingement with provocative maneuvers.  Left knee post surgical, no longer tender, swollen or warm to touch.   Right knee benign.  Some right wrist and elbow tenderness without  swelling. - resolved   B/l achilles tendon ?nodules, left with tenderness and swelling  Right bottom of foot tenderness and swelling   Lymphadenopathy:      Cervical: No cervical adenopathy.   Skin:     General: Skin is warm and dry.      Findings: No erythema or rash.      Comments: No periungal erythema or digital pits  No obvious psoriasis    Neurological:      Mental Status: She is alert and oriented to person, place, and time.      Cranial Nerves: No cranial nerve deficit.      Gait: Gait abnormal.   Psychiatric:         Mood and Affect: Mood normal.         Behavior: Behavior normal.       ?  Radiology review:       Scanned reports from bilateral MRI ankle  MRI left ankle  Findings: There is thickening and edema of the distal third of the Achilles tendon beginning 5.5 cm above the insertion on the posterior calcaneus.  Findings are consistent with an acute Achilles tendinitis.  The proximal Achilles tendon is intact and unremarkable.  Plantar fascia is intact without inflammation.  No fractures.  No bone edema.  No osteochondral lesions or synovitis.  Major ligaments and tendons are intact    MRI of the right ankle  Findings: There is marked edema and thickening of the medial cord of the plantar fascia near the calcaneal attachment site with a high-grade partial thickness tear.  There is marked heel pad edema, chronic adhesions involving the lateral heel pad fat and bone marrow edema involving the plantar aspect of the subcortical bone marrow of the calcaneus are present.  Findings are consistent with marked to severe acute plantar fasciitis.  The lateral cord is intact.  The Achilles tendon is normal.  Major ligaments and tendons are intact.  No fractures.    MR LEFT KNEE     HISTORY: Knee pain.     COMPARISON: MRI left knee 9/27/2022     TECHNIQUE: Multiplanar, multisequence MR imaging of the left knee was obtained without intravenous contrast.     FINDINGS:     LIGAMENTS:   The cruciate ligaments appear  intact.   Low-grade thickening of the tibial collateral ligament with mild adjacent edema may be compatible with a type I low-grade sprain.   The lateral collateral ligament appears intact.     MENISCI AND FEMORAL-TIBIAL HYALINE CARTILAGE:   Large radial tear of the posterior horn medial meniscus adjacent to the root.   Moderate meniscal degenerative signal at the posterior and middle thirds.   4 mm medial meniscal extrusion.   The lateral meniscus appears intact.   There is mild tibiofemoral chondromalacia.     PATELLOFEMORAL JOINT AND EXTENSOR MECHANISM:   Extensor mechanism appears intact.   Patellofemoral chondromalacia is again demonstrated with 8mm full-thickness chondral defect of the upper median ridge and adjacent subcortical cyst formation.   Patellar fat pads intact.      OSSEOUS/BONE MARROW:   Mild marrow edema along the medial tibiofemoral joint line.     GENERAL:   Small joint effusion.   Probable low-grade popliteus muscle strain.   IMPRESSION:     Interval large radial tear of the posterior horn medial meniscus with adjacent meniscal degeneration and mild meniscal extrusion.     Interval grade 1 medial collateral ligament sprain.     Stable patellar chondromalacia with full-thickness defect of the superior patellar median ridge.     Probable low-grade popliteus muscle strain.     Small knee joint effusion.     FINAL REPORT   Attending Radiologist:  Syed Carranza MD   Date Signed Off:  04/12/2023 11:03       Exam: MRI PELVIS W/O CONTRAST   CPT Code(s): 95882 - MRI PELVIS W/O DYE     MRI SACROILIAC JOINTS     HISTORY: Sacroiliitis, not elsewhere classified     COMPARISON: None currently available.     TECHNICAL: Routine MRI exam performed on a wide bore ultra high field 3.0 T Siemens Skyra MR scanner, without intravenous contrast.     FINDINGS:   Small marginal osteophytes at the right sacroiliac joint. Joint spaces are maintained bilaterally. No subchondral reactive marrow change or cyst formation.      Visualized lower lumbar spine demonstrates mild disc degeneration at the L4-5 and L5-S1 level with no significant central canal stenosis or neural foraminal narrowing. Sacrum and coccyx are intact. No fracture or other abnormal marrow signal. Bilateral   symmetric normal-appearing common origin of hamstrings.     IMPRESSION:   1. Small marginal osteophytes in the right SI joint. The SI joints are otherwise normal in appearance. No evidence for sacroiliitis.     Interpreting Radiologist:     Pierre Augustin M.D.   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       TECHNIQUE:  Frontal and oblique of the sacroiliac joints were obtained.       PATIENT STATED HISTORY: (As transcribed by Technologist)  Pt c/o chronic R sided SI joint pain and tightness into the hamstring.  No known injury.  No prev fx or surgery.            FINDINGS:  No acute fracture or dislocation is seen.  Very minimal osteophyte formation noted on the right along the inferior aspect.  Minimal sclerosis is present surrounding the sacroiliac joints.  If clinical symptoms persist then consider follow-up   imaging or MRI.                        Impression   CONCLUSION:  See above.           Dictated by (CST): Elfego Catalan MD on 2/03/2023 at 11:49 AM       Finalized by (CST): Elfego Catalan MD on 2/03/2023 at 11:50 AM      PROCEDURE:  XR LUMBAR SPINE (MIN 4 VIEWS) (CPT=72110)       LOCATION:  [facility]       TECHNIQUE:  AP, lateral, oblique, and coned down L5-S1 views were obtained.       COMPARISON:  None.       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  No known injury.  No prev fx or surgery.            FINDINGS:       There is normal lumbar lordosis.  No significant spondylolisthesis is present.  Vertebral bodies appear maintained in height.  Moderate facet arthropathy favored from L4 through S1.  Mild ventral osteophyte formation present from L3 through L5.  If   clinical symptoms persist then consider MRI.       Incidentally noted probable left renal stones measuring up to 1.8 x 2.1 cm.                        Impression   CONCLUSION:  See above.           Dictated by (CST): Elfego Catalan MD on 2/03/2023 at 11:42 AM       Finalized by (CST): Elfego Catalan MD on 2/03/2023 at 11:43 AM          MR LEFT KNEE     HISTORY: Left knee pain and swelling.  Evaluate for meniscus tear or loose body.  Possible inflammatory arthropathy.  No specific injury or trauma.     COMPARISON: Correlation with radiographs, 9/20/2022     TECHNIQUE: Multiplanar, multisequence MR imaging of the left knee was obtained without intravenous contrast.     FINDINGS:     LIGAMENTS:   ACL: Intact   PCL: Intact   MCL: Intact   LCL complex: Intact     MENISCI AND FEMORAL-TIBIAL HYALINE CARTILAGE:   Medial meniscus: No tear identified.   Lateral meniscus: No tear identified.     Medial compartment articular cartilage: Grade 1-2 chondromalacia weightbearing articular cartilage.  Suspect more focal subtle moderate to high-grade fissuring involving the inner central weightbearing medial femoral condyle with subtle adjacent subchondral marrow edema (series 4, image 15-17).   Lateral compartment articular cartilage: Grade 1-2 chondromalacia weightbearing articular cartilage.  No focal chondral defect identified.     PATELLOFEMORAL JOINT AND EXTENSOR MECHANISM:   High-grade patellar chondromalacia preferentially involving the medial patellar facet and median ridge.  Patellar subchondral cystic change and mild reactive marrow  edema.  Mild surface irregularity and fraying of the trochlear articular cartilage.  Quadriceps and patellar tendons appear intact.     OSSEOUS/BONE MARROW:   No acute or healing fracture identified.  Patellar subchondral reactive signal changes as above.  No erosions or periarticular reactive marrow edema.  Small marginal joint compartment osteophytes.     GENERAL:   Trace nonexpansile joint effusion.  Distention of a tiny slitlike Baker's cyst.  No discrete intra-articular bodies identified.       IMPRESSION:   No meniscus tear identified.     Intact ligaments.     High-grade patellar chondromalacia preferentially involving the medial facet and median ridge.     Trace nonexpansile effusion.     No erosions or periarticular reactive marrow edema identified.     No intra-articular body identified.     FINAL REPORT   Attending Radiologist:  Martell Irene MD   Date Signed Off:  09/27/2022 13:59    PROCEDURE:  CTA ABD (CPT=74175)       LOCATION:  [facility]         COMPARISON:  [facility], CT, CT ABD   PEL W/CONT, 12/06/2012, 9:15 AM.       INDICATIONS:  I72.8 Splenic artery aneurysm (HCC) R93.3 Abnormal finding on GI tract imaging       TECHNIQUE:   Contrast-enhanced multislice CT angiography of the abdominal aorta is performed using nonionic contrast.  Multiplanar 3D reconstructions are generated.  Dose reduction techniques were used. Dose information is transmitted to the ACR   (American College of Radiology) NRDR (National Radiology Data Registry) which includes the Dose Index Registry.       PATIENT STATED HISTORY:(As transcribed by Technologist)  Patient had a change in bowel habits and abnormal findings on a recent xray abdomen.        CONTRAST USED:  100cc of Omnipaque 350       FINDINGS:     LUNG BASES:  Stable.  No consolidation or pleural effusion.   LIVER:  Stable.  Diffuse low attenuation consistent with fatty infiltration.   BILIARY:  Stable.  Surgically absent gallbladder.  No biliary  dilatation.   PANCREAS:  Stable.  Uniform parenchyma.  No ductal dilatation.   SPLEEN:  Stable.  Not enlarged.   KIDNEYS:  Stable.  Normal anatomic positions.  No hydronephrosis or perinephric stranding.  Uniform parenchymal enhancement.   ADRENALS:  Stable.  Not enlarged.   AORTA/VASCULAR:  There is smooth tapering of the abdominal aorta.  No abdominal aortic aneurysm.  Patent celiac artery, SMA and STEPHANIE.  Single renal arteries bilaterally.  Typical position of the left renal vein, anterior to the abdominal aorta.     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  It is heavily calcified.  Diameter is measured at 1.2 cm.  Progressive calcification compared to the prior.  Diameter remeasured on the prior, non CTA,   study at 1.1 cm.   RETROPERITONEUM:  Stable.  No adenopathy.   BOWEL/MESENTERY:  Stable.  Normal bowel caliber.  No colonic inflammation.  Moderate scattered stool.  No ascites.   ABDOMINAL WALL:  Stable.  Umbilical eventration.   BONES:  Stable.  Mild degenerative changes.   OTHER:  None.                            Impression   CONCLUSION:     1. Stable size distal splenic artery aneurysm with progressive calcification.   2. Hepatic steatosis.   3. Details as above.                    Dictated by (CST): Juan Carlos Wayne MD on 7/27/2022 at 7:56 AM       Finalized by (CST): Juan Carlos Wayne MD on 7/27/2022 at 8:50 AM       PROCEDURE:  XR KNEE ROUTINE (3 VIEWS), LEFT (CPT=73562)       TECHNIQUE:  Three views were obtained including patellar view.       COMPARISON:  None.       INDICATIONS:       PATIENT STATED HISTORY: (As transcribed by Technologist)  Pt. has left knee pain x 2 weeks.  Pt. feels her knee may be filled with fluid due to pain and swelling.  Pain medially and proximal to patella.             FINDINGS:  Joint spaces are normal.  Minimal patella spurs.  There is mild chondrocalcinosis.  No fracture, expansile lesion or erosion.  No joint effusion is  suggested.        Impression   CONCLUSION:     1. Minimal patella spurs.   2. Mild chondrocalcinosis.       Dictated by (CST): Juan Carlos Wayne MD on 2/17/2022 at 5:07 PM       Finalized by (CST): Juan Carlos Wayne MD on 2/17/2022 at 5:08 PM      Labs:  Lab Results   Component Value Date    WBC 9.0 06/11/2025    RBC 4.83 06/11/2025    HGB 14.1 06/11/2025    HCT 42.9 06/11/2025    .0 06/11/2025    MCV 88.8 06/11/2025    MCH 29.2 06/11/2025    MCHC 32.9 06/11/2025    RDW 12.6 06/11/2025    NEPRELIM 5.89 06/11/2025    NEPERCENT 65.5 06/11/2025    LYPERCENT 22.4 06/11/2025    MOPERCENT 7.2 06/11/2025    EOPERCENT 4.0 06/11/2025    BAPERCENT 0.7 06/11/2025    NE 5.89 06/11/2025    LYMABS 2.02 06/11/2025    MOABSO 0.65 06/11/2025    EOABSO 0.36 06/11/2025    BAABSO 0.06 06/11/2025     Lab Results   Component Value Date     (H) 06/11/2025    BUN 9 06/11/2025    BUNCREA 14.9 04/19/2021    CREATSERUM 0.80 06/11/2025    ANIONGAP 9 06/11/2025     07/13/2017    GFRNAA 98 07/26/2022    GFRAA 113 07/26/2022    CA 9.8 06/11/2025    OSMOCALC 292 06/11/2025    ALKPHO 85 06/11/2025    AST 27 06/11/2025    ALT 33 06/11/2025    BILT 0.5 06/11/2025    TP 7.1 06/11/2025    ALB 4.6 06/11/2025    GLOBULIN 2.5 06/11/2025     06/11/2025    K 3.8 06/11/2025     06/11/2025    CO2 27.0 06/11/2025       Additional Labs:  01/2024  ESR 18 normal  CRP 0.54 borderline    08/2023  ESR 19 normal  CRP 0.64 borderline  B12 473 normal  Vit D 30.9 borderline     04/2023  ANCA, MPO, PR3 negative   ESR 21 normal  CRP 1.03 elevated     02/2023  HLA-B27 negative  IgA, IgG, IgM normal  CMP grossly normal with exception of elevated blood glucose  CBC grossly normal  MARY JO by IFA negative  CCP negative  RF negative  ESR 17 normal  CRP 1.05 elevated    09/2022  Synovial fluid analysis no birefrigment crystals; urine culture negative  Nucleated body cells 34, 445  CBC with WBC 12.3; hemoglobin 14.7; platelet 292  CMP grossly normal  ESR 27  borderline elevated  CRP 44.1 elevated (N<10)  Uric acid 4.9    Brenda Chapin DO  [facility]  06/11/2025

## 2025-06-14 LAB
G6PD QN: 280 U/10E12 RBC
RBC: 4.8 X10E6/UL

## 2025-06-30 DIAGNOSIS — I10 ESSENTIAL HYPERTENSION: ICD-10-CM

## 2025-07-01 DIAGNOSIS — I10 ESSENTIAL HYPERTENSION: ICD-10-CM

## 2025-07-02 RX ORDER — METOPROLOL SUCCINATE 50 MG/1
50 TABLET, EXTENDED RELEASE ORAL DAILY
Qty: 90 TABLET | Refills: 0 | OUTPATIENT
Start: 2025-07-02

## 2025-07-02 RX ORDER — METOPROLOL SUCCINATE 50 MG/1
50 TABLET, EXTENDED RELEASE ORAL DAILY
Qty: 90 TABLET | Refills: 3 | Status: SHIPPED | OUTPATIENT
Start: 2025-07-02

## 2025-07-02 NOTE — TELEPHONE ENCOUNTER
Refill passed per Clinic protocol.  Requested Prescriptions   Pending Prescriptions Disp Refills    METOPROLOL SUCCINATE ER 50 MG Oral Tablet 24 Hr [Pharmacy Med Name: METOPROLOL ER SUCCINATE 50MG TABS] 90 tablet 0     Sig: TAKE 1 TABLET(50 MG) BY MOUTH DAILY       Hypertension Medications Protocol Passed - 7/2/2025 11:55 AM        Passed - CMP or BMP in past 12 months        Passed - Last BP reading less than 140/90     BP Readings from Last 1 Encounters:   06/11/25 132/60               Passed - In person appointment or virtual visit in the past 12 mos or appointment in next 3 mos     Recent Outpatient Visits              3 weeks ago Inflammatory arthritis    [facility] Brenda Chapin DO    Office Visit    2 months ago Type 2 diabetes mellitus without complication, without long-term current use of insulin ([facility]    4 months ago Type 2 diabetes mellitus without complication, without long-term current use of insulin ([facility]    [facility]    4 months ago Type 2 diabetes mellitus without complication, without long-term current use of insulin ([facility]    [facility] Visit    5 months ago Type 2 diabetes mellitus without complication, without long-term current use of insulin ([facility])    [facility]          Future Appointments         Provider Department Appt Notes    In 5 months Brenda Chapin DO [facility], [address] [place] [address] fu    In 9 months Brenda Chapin DO [facility] fu april                    Passed - EGFRCR or GFRNAA > 50     GFR  Evaluation  EGFRCR: 85 , resulted on 6/11/2025          Passed - Medication is active on med list

## 2025-07-11 ENCOUNTER — LAB ENCOUNTER (OUTPATIENT)
Dept: LAB | Age: 60
End: 2025-07-11
Attending: INTERNAL MEDICINE
Payer: COMMERCIAL

## 2025-07-11 DIAGNOSIS — Z79.899 HIGH RISK MEDICATION USE: ICD-10-CM

## 2025-07-11 DIAGNOSIS — M19.90 INFLAMMATORY ARTHRITIS: ICD-10-CM

## 2025-07-11 DIAGNOSIS — M08.80 ENTHESITIS RELATED ARTHRITIS (HCC): ICD-10-CM

## 2025-07-11 LAB
ALBUMIN SERPL-MCNC: 4.7 G/DL (ref 3.2–4.8)
ALBUMIN/GLOB SERPL: 1.7 {RATIO} (ref 1–2)
ALP LIVER SERPL-CCNC: 92 U/L (ref 46–118)
ALT SERPL-CCNC: 28 U/L (ref 10–49)
ANION GAP SERPL CALC-SCNC: 5 MMOL/L (ref 0–18)
AST SERPL-CCNC: 24 U/L (ref ?–34)
BASOPHILS # BLD AUTO: 0.08 X10(3) UL (ref 0–0.2)
BASOPHILS NFR BLD AUTO: 1.1 %
BILIRUB SERPL-MCNC: 0.5 MG/DL (ref 0.3–1.2)
BUN BLD-MCNC: 9 MG/DL (ref 9–23)
CALCIUM BLD-MCNC: 9.9 MG/DL (ref 8.7–10.6)
CHLORIDE SERPL-SCNC: 104 MMOL/L (ref 98–112)
CO2 SERPL-SCNC: 34 MMOL/L (ref 21–32)
CREAT BLD-MCNC: 0.81 MG/DL (ref 0.55–1.02)
CRP SERPL-MCNC: <0.5 MG/DL (ref ?–0.5)
EGFRCR SERPLBLD CKD-EPI 2021: 84 ML/MIN/1.73M2 (ref 60–?)
EOSINOPHIL # BLD AUTO: 0.24 X10(3) UL (ref 0–0.7)
EOSINOPHIL NFR BLD AUTO: 3.2 %
ERYTHROCYTE [DISTWIDTH] IN BLOOD BY AUTOMATED COUNT: 12.8 %
ERYTHROCYTE [SEDIMENTATION RATE] IN BLOOD: 8 MM/HR (ref 0–30)
FASTING STATUS PATIENT QL REPORTED: NO
GLOBULIN PLAS-MCNC: 2.7 G/DL (ref 2–3.5)
GLUCOSE BLD-MCNC: 90 MG/DL (ref 70–99)
HCT VFR BLD AUTO: 44.2 % (ref 35–48)
HGB BLD-MCNC: 14.6 G/DL (ref 12–16)
IMM GRANULOCYTES # BLD AUTO: 0.01 X10(3) UL (ref 0–1)
IMM GRANULOCYTES NFR BLD: 0.1 %
LYMPHOCYTES # BLD AUTO: 1.75 X10(3) UL (ref 1–4)
LYMPHOCYTES NFR BLD AUTO: 23.1 %
MCH RBC QN AUTO: 29.2 PG (ref 26–34)
MCHC RBC AUTO-ENTMCNC: 33 G/DL (ref 31–37)
MCV RBC AUTO: 88.4 FL (ref 80–100)
MONOCYTES # BLD AUTO: 0.89 X10(3) UL (ref 0.1–1)
MONOCYTES NFR BLD AUTO: 11.8 %
NEUTROPHILS # BLD AUTO: 4.59 X10 (3) UL (ref 1.5–7.7)
NEUTROPHILS # BLD AUTO: 4.59 X10(3) UL (ref 1.5–7.7)
NEUTROPHILS NFR BLD AUTO: 60.7 %
OSMOLALITY SERPL CALC.SUM OF ELEC: 294 MOSM/KG (ref 275–295)
PLATELET # BLD AUTO: 249 10(3)UL (ref 150–450)
POTASSIUM SERPL-SCNC: 3.9 MMOL/L (ref 3.5–5.1)
PROT SERPL-MCNC: 7.4 G/DL (ref 5.7–8.2)
RBC # BLD AUTO: 5 X10(6)UL (ref 3.8–5.3)
SODIUM SERPL-SCNC: 143 MMOL/L (ref 136–145)
WBC # BLD AUTO: 7.6 X10(3) UL (ref 4–11)

## 2025-07-11 PROCEDURE — 85025 COMPLETE CBC W/AUTO DIFF WBC: CPT | Performed by: INTERNAL MEDICINE

## 2025-07-11 PROCEDURE — 86140 C-REACTIVE PROTEIN: CPT | Performed by: INTERNAL MEDICINE

## 2025-07-11 PROCEDURE — 85652 RBC SED RATE AUTOMATED: CPT | Performed by: INTERNAL MEDICINE

## 2025-07-11 PROCEDURE — 80053 COMPREHEN METABOLIC PANEL: CPT | Performed by: INTERNAL MEDICINE

## 2025-07-20 RX ORDER — TIRZEPATIDE 5 MG/.5ML
5 INJECTION, SOLUTION SUBCUTANEOUS WEEKLY
Qty: 6 ML | Refills: 0 | Status: CANCELLED | OUTPATIENT
Start: 2025-07-20

## 2025-07-21 RX ORDER — TIRZEPATIDE 5 MG/.5ML
5 INJECTION, SOLUTION SUBCUTANEOUS WEEKLY
Qty: 6 ML | Refills: 0 | OUTPATIENT
Start: 2025-07-21

## 2025-07-21 RX ORDER — TIRZEPATIDE 5 MG/.5ML
5 INJECTION, SOLUTION SUBCUTANEOUS WEEKLY
Qty: 6 ML | Refills: 0 | Status: SHIPPED | OUTPATIENT
Start: 2025-07-21

## 2025-07-21 NOTE — TELEPHONE ENCOUNTER
Diabetes Medication Protocol Passed07/16/2025 09:20 PM   Protocol Details Last A1C < 7.5 and within past 6 months    In person appointment or virtual visit in the past 6 mos or appointment in next 3 mos    Microalbumin procedure in past 12 months or taking ACE/ARB    EGFRCR or GFRNAA > 50    GFR in the past 12 months    Medication is active on med list

## 2025-08-08 ENCOUNTER — LAB ENCOUNTER (OUTPATIENT)
Dept: LAB | Age: 60
End: 2025-08-08
Attending: INTERNAL MEDICINE

## 2025-08-08 DIAGNOSIS — M08.80 ENTHESITIS RELATED ARTHRITIS (HCC): ICD-10-CM

## 2025-08-08 DIAGNOSIS — M19.90 INFLAMMATORY ARTHRITIS: ICD-10-CM

## 2025-08-08 DIAGNOSIS — Z79.899 HIGH RISK MEDICATION USE: ICD-10-CM

## 2025-08-08 LAB
ALBUMIN SERPL-MCNC: 4.6 G/DL (ref 3.2–4.8)
ALBUMIN/GLOB SERPL: 1.8 (ref 1–2)
ALP LIVER SERPL-CCNC: 93 U/L (ref 46–118)
ALT SERPL-CCNC: 26 U/L (ref 10–49)
ANION GAP SERPL CALC-SCNC: 9 MMOL/L (ref 0–18)
AST SERPL-CCNC: 25 U/L (ref ?–34)
BASOPHILS # BLD AUTO: 0.06 X10(3) UL (ref 0–0.2)
BASOPHILS NFR BLD AUTO: 0.8 %
BILIRUB SERPL-MCNC: 0.4 MG/DL (ref 0.3–1.2)
BUN BLD-MCNC: 9 MG/DL (ref 9–23)
CALCIUM BLD-MCNC: 9.7 MG/DL (ref 8.7–10.6)
CHLORIDE SERPL-SCNC: 105 MMOL/L (ref 98–112)
CO2 SERPL-SCNC: 28 MMOL/L (ref 21–32)
CREAT BLD-MCNC: 0.85 MG/DL (ref 0.55–1.02)
CRP SERPL-MCNC: <0.5 MG/DL (ref ?–0.5)
EGFRCR SERPLBLD CKD-EPI 2021: 79 ML/MIN/1.73M2 (ref 60–?)
EOSINOPHIL # BLD AUTO: 0.21 X10(3) UL (ref 0–0.7)
EOSINOPHIL NFR BLD AUTO: 2.7 %
ERYTHROCYTE [DISTWIDTH] IN BLOOD BY AUTOMATED COUNT: 13.2 %
ERYTHROCYTE [SEDIMENTATION RATE] IN BLOOD: 4 MM/HR (ref 0–30)
FASTING STATUS PATIENT QL REPORTED: YES
GLOBULIN PLAS-MCNC: 2.6 G/DL (ref 2–3.5)
GLUCOSE BLD-MCNC: 114 MG/DL (ref 70–99)
HCT VFR BLD AUTO: 43.6 % (ref 35–48)
HGB BLD-MCNC: 14.4 G/DL (ref 12–16)
IMM GRANULOCYTES # BLD AUTO: 0.02 X10(3) UL (ref 0–1)
IMM GRANULOCYTES NFR BLD: 0.3 %
LYMPHOCYTES # BLD AUTO: 1.41 X10(3) UL (ref 1–4)
LYMPHOCYTES NFR BLD AUTO: 18 %
MCH RBC QN AUTO: 29 PG (ref 26–34)
MCHC RBC AUTO-ENTMCNC: 33 G/DL (ref 31–37)
MCV RBC AUTO: 87.7 FL (ref 80–100)
MONOCYTES # BLD AUTO: 0.79 X10(3) UL (ref 0.1–1)
MONOCYTES NFR BLD AUTO: 10.1 %
NEUTROPHILS # BLD AUTO: 5.33 X10 (3) UL (ref 1.5–7.7)
NEUTROPHILS # BLD AUTO: 5.33 X10(3) UL (ref 1.5–7.7)
NEUTROPHILS NFR BLD AUTO: 68.1 %
OSMOLALITY SERPL CALC.SUM OF ELEC: 294 MOSM/KG (ref 275–295)
PLATELET # BLD AUTO: 252 10(3)UL (ref 150–450)
POTASSIUM SERPL-SCNC: 4.4 MMOL/L (ref 3.5–5.1)
PROT SERPL-MCNC: 7.2 G/DL (ref 5.7–8.2)
RBC # BLD AUTO: 4.97 X10(6)UL (ref 3.8–5.3)
SODIUM SERPL-SCNC: 142 MMOL/L (ref 136–145)
WBC # BLD AUTO: 7.8 X10(3) UL (ref 4–11)

## 2025-08-08 PROCEDURE — 86140 C-REACTIVE PROTEIN: CPT | Performed by: INTERNAL MEDICINE

## 2025-08-08 PROCEDURE — 85652 RBC SED RATE AUTOMATED: CPT | Performed by: INTERNAL MEDICINE

## 2025-08-08 PROCEDURE — 85025 COMPLETE CBC W/AUTO DIFF WBC: CPT | Performed by: INTERNAL MEDICINE

## 2025-08-08 PROCEDURE — 80053 COMPREHEN METABOLIC PANEL: CPT | Performed by: INTERNAL MEDICINE

## 2025-08-29 DIAGNOSIS — M19.90 INFLAMMATORY ARTHRITIS: Primary | ICD-10-CM

## 2025-08-29 DIAGNOSIS — M08.80 ENTHESITIS RELATED ARTHRITIS (HCC): ICD-10-CM

## 2025-08-29 RX ORDER — SULFASALAZINE 500 MG/1
500 TABLET ORAL 2 TIMES DAILY
Qty: 180 TABLET | Refills: 0 | Status: SHIPPED | OUTPATIENT
Start: 2025-08-29

## (undated) DEVICE — GIJAW SINGLE-USE BIOPSY FORCEPS WITH NEEDLE: Brand: GIJAW

## (undated) DEVICE — BITEBLOCK ENDOSCP 60FR MAXI STRP

## (undated) DEVICE — STERIS KITS

## (undated) DEVICE — 1200CC GUARDIAN II: Brand: GUARDIAN

## (undated) DEVICE — 3M™ RED DOT™ MONITORING ELECTRODE WITH FOAM TAPE AND STICKY GEL 2570-3, 3/BAG, 200/CASE, 54/PLT: Brand: RED DOT™

## (undated) DEVICE — LASSO POLYPECTOMY SNARE: Brand: LASSO

## (undated) DEVICE — KIT VLV 5 PC AIR H2O SUCT BX ENDOGATOR CONN

## (undated) DEVICE — 3M™ RED DOT™ MONITORING ELECTRODE WITH FOAM TAPE AND STICKY GEL, 50/BAG, 20/CASE, 72/PLT 2570: Brand: RED DOT™

## (undated) DEVICE — TRAP POLYP W/ 2 SPEC TY CLR MAGNIFYING WIND

## (undated) DEVICE — 10FT COMBINED O2 DELIVERY/CO2 MONITORING. FILTER WITH MICROSTREAM TYPE LUER: Brand: DUAL ADULT NASAL CANNULA

## (undated) NOTE — Clinical Note
05/15/2017        Victor Manuel Wayne [address] [address]      Dear Colleen Elkins records indicate that you have outstanding lab work and or testing that was ordered for you and has not yet been completed:  [address]

## (undated) NOTE — Clinical Note
04/10/2017        Dmitry Wayne [address] [place] [address]      Dear Odessa Tesfaye records indicate that you have outstanding lab work and or testing that was ordered for you and has not yet been completed:  CMP  To provide you with the

## (undated) NOTE — MR AVS SNAPSHOT
[address] [facility]  [place] [address]  [phone]               Thank you for choosing us for your health care visit with Dara Whatley PA-C.   We are glad to serve you and happy to provide you with · Drink plenty of water, hot tea, and other liquids. This may help thin mucus. It also may promote sinus drainage. · Heat may help soothe painful areas of the face. Use a towel soaked in hot water.  Or,  the shower and direct the hot spray onto you · Unusual drowsiness or confusion  · Swelling of the forehead or eyelids  · Vision problems, including blurred or double vision  · Fever of 100.4ºF (38ºC) or higher, or as directed by your healthcare provider  · Seizure  · Breathing problems  · Symptoms no MyChart     Visit C-Note  You can access your MyChart to more actively manage your health care and view more details from this visit by going to [email or web address].   If you've recently had a stay at the Hospital you can access your discharge ins Make half your plate fruits and vegetables Highly refined, white starches including white bread, rice and pasta   Eat plenty of protein, keep the fat content low Sugars:  sodas and sports drinks, candies and desserts   Eat plenty of low-fat dairy products

## (undated) NOTE — MR AVS SNAPSHOT
[address] [facility] [address] [phone]               Thank you for choosing us for your health care visit with Rosemarie Molina MD.  We are glad to serve you and happy to provide you with this sum Phone:  [phone]    - Sulfamethoxazole-TMP -160 MG Tabs per tablet            MyChart     Visit MyChart  You can access your MyChart to more actively manage your health care and view more details from this visit by going to [email or web address]

## (undated) NOTE — LETTER
Maday Woods  [address] [facility]  [place] [address]    3/1/2019      Dear  Maday Woods    In order to provide the highest quality care, [facility] uses a sophisticated computer system to track our patient

## (undated) NOTE — Clinical Note
Hi, Dr. Heavenly Collins. Thank you for referring Ms. Minnie Morales for rheumatologic evaluation. Please see the discussion portion of my note and let me know if you have any questions.    Yaron Ortiz, DO [facility] 2/2/2023

## (undated) NOTE — Clinical Note
06/20/2017        Christiane Wayne [address] [address]      Dear Joy Bryan records indicate that you have outstanding fasting lab work and or testing that was ordered for you and has not yet been completed:  CMP  To provide you